# Patient Record
Sex: MALE | Race: WHITE | NOT HISPANIC OR LATINO | URBAN - METROPOLITAN AREA
[De-identification: names, ages, dates, MRNs, and addresses within clinical notes are randomized per-mention and may not be internally consistent; named-entity substitution may affect disease eponyms.]

---

## 2017-01-10 ENCOUNTER — EMERGENCY (EMERGENCY)
Facility: HOSPITAL | Age: 77
LOS: 0 days | Discharge: HOME | End: 2017-01-11
Admitting: EMERGENCY MEDICINE
Payer: MEDICARE

## 2017-01-10 DIAGNOSIS — R33.9 RETENTION OF URINE, UNSPECIFIED: ICD-10-CM

## 2017-01-10 DIAGNOSIS — R10.2 PELVIC AND PERINEAL PAIN: ICD-10-CM

## 2017-01-10 DIAGNOSIS — Z98.89 OTHER SPECIFIED POSTPROCEDURAL STATES: Chronic | ICD-10-CM

## 2017-01-10 DIAGNOSIS — Z90.49 ACQUIRED ABSENCE OF OTHER SPECIFIED PARTS OF DIGESTIVE TRACT: Chronic | ICD-10-CM

## 2017-01-10 DIAGNOSIS — Z46.6 ENCOUNTER FOR FITTING AND ADJUSTMENT OF URINARY DEVICE: ICD-10-CM

## 2017-01-10 PROCEDURE — 99284 EMERGENCY DEPT VISIT MOD MDM: CPT | Mod: 25

## 2017-01-10 PROCEDURE — 99053 MED SERV 10PM-8AM 24 HR FAC: CPT

## 2017-01-11 PROCEDURE — 93010 ELECTROCARDIOGRAM REPORT: CPT

## 2017-01-11 PROCEDURE — 71010: CPT | Mod: 26

## 2017-01-11 PROCEDURE — 74176 CT ABD & PELVIS W/O CONTRAST: CPT | Mod: 26

## 2017-02-19 ENCOUNTER — INPATIENT (INPATIENT)
Facility: HOSPITAL | Age: 77
LOS: 2 days | Discharge: HOME | End: 2017-02-22
Attending: INTERNAL MEDICINE | Admitting: INTERNAL MEDICINE
Payer: MEDICARE

## 2017-02-19 VITALS
OXYGEN SATURATION: 97 % | RESPIRATION RATE: 26 BRPM | SYSTOLIC BLOOD PRESSURE: 115 MMHG | DIASTOLIC BLOOD PRESSURE: 70 MMHG | HEART RATE: 96 BPM

## 2017-02-19 DIAGNOSIS — Z98.89 OTHER SPECIFIED POSTPROCEDURAL STATES: Chronic | ICD-10-CM

## 2017-02-19 DIAGNOSIS — Z90.49 ACQUIRED ABSENCE OF OTHER SPECIFIED PARTS OF DIGESTIVE TRACT: Chronic | ICD-10-CM

## 2017-02-19 LAB
ALBUMIN SERPL ELPH-MCNC: 3.3 G/DL — SIGNIFICANT CHANGE UP (ref 3.3–5)
ALP SERPL-CCNC: 86 U/L — SIGNIFICANT CHANGE UP (ref 40–120)
ALT FLD-CCNC: 10 U/L — LOW (ref 12–78)
ANION GAP SERPL CALC-SCNC: 11 MMOL/L — SIGNIFICANT CHANGE UP (ref 5–17)
APPEARANCE UR: (no result)
AST SERPL-CCNC: 23 U/L — SIGNIFICANT CHANGE UP (ref 15–37)
BACTERIA # UR AUTO: (no result)
BILIRUB SERPL-MCNC: 0.4 MG/DL — SIGNIFICANT CHANGE UP (ref 0.2–1.2)
BILIRUB UR-MCNC: NEGATIVE — SIGNIFICANT CHANGE UP
BUN SERPL-MCNC: 13 MG/DL — SIGNIFICANT CHANGE UP (ref 7–23)
CALCIUM SERPL-MCNC: 8.7 MG/DL — SIGNIFICANT CHANGE UP (ref 8.5–10.1)
CHLORIDE SERPL-SCNC: 104 MMOL/L — SIGNIFICANT CHANGE UP (ref 96–108)
CK SERPL-CCNC: 72 U/L — SIGNIFICANT CHANGE UP (ref 26–308)
CO2 SERPL-SCNC: 27 MMOL/L — SIGNIFICANT CHANGE UP (ref 22–31)
COLOR SPEC: YELLOW — SIGNIFICANT CHANGE UP
CREAT SERPL-MCNC: 1.1 MG/DL — SIGNIFICANT CHANGE UP (ref 0.5–1.3)
DIFF PNL FLD: (no result)
EPI CELLS # UR: SIGNIFICANT CHANGE UP
GLUCOSE SERPL-MCNC: 115 MG/DL — HIGH (ref 70–99)
GLUCOSE UR QL: NEGATIVE MG/DL — SIGNIFICANT CHANGE UP
KETONES UR-MCNC: NEGATIVE — SIGNIFICANT CHANGE UP
LEUKOCYTE ESTERASE UR-ACNC: (no result)
MAGNESIUM SERPL-MCNC: 1.9 MG/DL — SIGNIFICANT CHANGE UP (ref 1.8–2.4)
NITRITE UR-MCNC: POSITIVE
NT-PROBNP SERPL-SCNC: 181 PG/ML — SIGNIFICANT CHANGE UP (ref 0–450)
PH UR: 6.5 — SIGNIFICANT CHANGE UP (ref 4.8–8)
POTASSIUM SERPL-MCNC: 3.7 MMOL/L — SIGNIFICANT CHANGE UP (ref 3.5–5.3)
POTASSIUM SERPL-SCNC: 3.7 MMOL/L — SIGNIFICANT CHANGE UP (ref 3.5–5.3)
PROT SERPL-MCNC: 7.2 GM/DL — SIGNIFICANT CHANGE UP (ref 6–8.3)
PROT UR-MCNC: 100 MG/DL
RAPID RVP RESULT: SIGNIFICANT CHANGE UP
RBC CASTS # UR COMP ASSIST: (no result) /HPF (ref 0–4)
SODIUM SERPL-SCNC: 142 MMOL/L — SIGNIFICANT CHANGE UP (ref 135–145)
SP GR SPEC: 1.01 — SIGNIFICANT CHANGE UP (ref 1.01–1.02)
TROPONIN I SERPL-MCNC: <0.015 NG/ML — SIGNIFICANT CHANGE UP (ref 0.01–0.04)
TROPONIN I SERPL-MCNC: <0.015 NG/ML — SIGNIFICANT CHANGE UP (ref 0.01–0.04)
UROBILINOGEN FLD QL: NEGATIVE MG/DL — SIGNIFICANT CHANGE UP
WBC UR QL: >50

## 2017-02-19 PROCEDURE — 93010 ELECTROCARDIOGRAM REPORT: CPT

## 2017-02-19 PROCEDURE — 71010: CPT | Mod: 26

## 2017-02-19 PROCEDURE — 71275 CT ANGIOGRAPHY CHEST: CPT | Mod: 26

## 2017-02-19 PROCEDURE — 99285 EMERGENCY DEPT VISIT HI MDM: CPT | Mod: 25

## 2017-02-19 RX ORDER — HEPARIN SODIUM 5000 [USP'U]/ML
5000 INJECTION INTRAVENOUS; SUBCUTANEOUS EVERY 12 HOURS
Qty: 0 | Refills: 0 | Status: DISCONTINUED | OUTPATIENT
Start: 2017-02-19 | End: 2017-02-22

## 2017-02-19 RX ORDER — SODIUM CHLORIDE 9 MG/ML
1000 INJECTION INTRAMUSCULAR; INTRAVENOUS; SUBCUTANEOUS ONCE
Qty: 0 | Refills: 0 | Status: DISCONTINUED | OUTPATIENT
Start: 2017-02-19 | End: 2017-02-20

## 2017-02-19 RX ORDER — ACETAMINOPHEN 500 MG
650 TABLET ORAL EVERY 6 HOURS
Qty: 0 | Refills: 0 | Status: DISCONTINUED | OUTPATIENT
Start: 2017-02-19 | End: 2017-02-22

## 2017-02-19 RX ORDER — OXYBUTYNIN CHLORIDE 5 MG
5 TABLET ORAL THREE TIMES A DAY
Qty: 0 | Refills: 0 | Status: DISCONTINUED | OUTPATIENT
Start: 2017-02-19 | End: 2017-02-20

## 2017-02-19 RX ORDER — CEFTRIAXONE 500 MG/1
1 INJECTION, POWDER, FOR SOLUTION INTRAMUSCULAR; INTRAVENOUS EVERY 24 HOURS
Qty: 0 | Refills: 0 | Status: DISCONTINUED | OUTPATIENT
Start: 2017-02-20 | End: 2017-02-21

## 2017-02-19 RX ORDER — KETOROLAC TROMETHAMINE 30 MG/ML
15 SYRINGE (ML) INJECTION ONCE
Qty: 0 | Refills: 0 | Status: DISCONTINUED | OUTPATIENT
Start: 2017-02-19 | End: 2017-02-19

## 2017-02-19 RX ORDER — CEFTRIAXONE 500 MG/1
1 INJECTION, POWDER, FOR SOLUTION INTRAMUSCULAR; INTRAVENOUS ONCE
Qty: 0 | Refills: 0 | Status: COMPLETED | OUTPATIENT
Start: 2017-02-19 | End: 2017-02-19

## 2017-02-19 RX ADMIN — CEFTRIAXONE 100 GRAM(S): 500 INJECTION, POWDER, FOR SOLUTION INTRAMUSCULAR; INTRAVENOUS at 13:14

## 2017-02-19 RX ADMIN — Medication 5 MILLIGRAM(S): at 14:43

## 2017-02-19 RX ADMIN — Medication 650 MILLIGRAM(S): at 16:05

## 2017-02-19 RX ADMIN — HEPARIN SODIUM 5000 UNIT(S): 5000 INJECTION INTRAVENOUS; SUBCUTANEOUS at 18:17

## 2017-02-19 RX ADMIN — Medication 15 MILLIGRAM(S): at 18:47

## 2017-02-19 RX ADMIN — Medication 15 MILLIGRAM(S): at 18:16

## 2017-02-19 NOTE — H&P ADULT - NSHPPHYSICALEXAM_GEN_ALL_CORE
Vital Signs Last 24 Hrs  T(C): 36.7, Max: 36.8 (02-19 @ 07:14)  T(F): 98, Max: 98.3 (02-19 @ 07:14)  HR: 87 (72 - 96)  BP: 132/80 (104/65 - 132/80)  BP(mean): --  RR: 18 (18 - 26)  SpO2: 100% (97% - 100%)

## 2017-02-19 NOTE — ED PROVIDER NOTE - DETAILS:
Rosa Diaz MD - The scribe's documentation has been prepared under my direction and personally reviewed by me in its entirety. I confirm that the note above accurately reflects all work, treatment, procedures, and medical decision making performed by me.

## 2017-02-19 NOTE — ED PROVIDER NOTE - CONSTITUTIONAL APPEARANCE HYGIENE, MLM
Thin, chronically ill appearing, no teeth. alert, oriented to person, place, time/situation. Appears fatigued.

## 2017-02-19 NOTE — ED ADULT NURSE REASSESSMENT NOTE - NS ED NURSE REASSESS COMMENT FT1
pt was laying in bed no complain  is a/o 3, pt was straight cath while draining the ruin out pt was c/o pain. urine was dark urine was send to the lab. pt is eating lunch. awaiting for disposition.

## 2017-02-19 NOTE — ED ADULT NURSE NOTE - OBJECTIVE STATEMENT
pt c/o shortness of breath with chills. pt denies any recent fevers or sick contacts.  pt states he has penis pain which is chronic since unknown urology procedure 5 months ago. patient placed in bed and on cardiac monitor. helped to direct patient to slow breathing down.

## 2017-02-19 NOTE — ED PROVIDER NOTE - OBJECTIVE STATEMENT
78 y/o male pmhx AAA presents to ED due to difficulty breathing. Pt c/o right sided chest pain for hours, cough denies fever. PMD Mejia

## 2017-02-19 NOTE — H&P ADULT - ASSESSMENT
IMP:    76 y/o male with bladder spasm and self bladder cath x 10 yrs presents with supra pubic pain and L sided CP found to have UTI and likely bladder spasm.  No evidence of ACS    Plan:    Admit to floor    UTI--cont with CTX--follow up UCx    Bladder spasm--cont with oxybutynin and self cath as needed    DVT prophy--sqhep    Regular diet

## 2017-02-19 NOTE — ED PROVIDER NOTE - CARE PLAN
Principal Discharge DX:	Chest pain, unspecified type  Secondary Diagnosis:	Urinary tract infection associated with catheterization of urinary tract, unspecified indwelling urinary catheter type, subsequent encounter

## 2017-02-19 NOTE — H&P ADULT - HISTORY OF PRESENT ILLNESS
76 y/o male with bladder spasm and self bladder cath x 10 yrs presents with supra pubic pain and L sided CP.  Trop negative x 2. EKG--NSR with RBBB/L axis and LAHB, chest CTA negative for PE/infiltates. + UA given CTX in ED. Normal WBC and no fever  On my exam, c/o supra pubic pain, mod distress from pain, stable vitals.

## 2017-02-19 NOTE — H&P ADULT - NSHPLABSRESULTS_GEN_ALL_CORE
11.0   8.1   )-----------( 300      ( 2017 09:30 )             32.8       CBC Full  -  ( 2017 09:30 )  WBC Count : 8.1 K/uL  Hemoglobin : 11.0 g/dL  Hematocrit : 32.8 %  Platelet Count - Automated : 300 K/uL  Mean Cell Volume : 87.8 fl  Mean Cell Hemoglobin : 29.6 pg  Mean Cell Hemoglobin Concentration : 33.7 gm/dL  Auto Neutrophil # : 5.5 K/uL  Auto Lymphocyte # : 1.5 K/uL  Auto Monocyte # : 0.7 K/uL  Auto Eosinophil # : 0.3 K/uL  Auto Basophil # : 0.1 K/uL  Auto Neutrophil % : 67.8 %  Auto Lymphocyte % : 18.9 %  Auto Monocyte % : 8.4 %  Auto Eosinophil % : 4.1 %  Auto Basophil % : 0.9 %      2017 04:16    142    |  104    |  13     ----------------------------<  115    3.7     |  27     |  1.10     Ca    8.7        2017 04:16  Mg     1.9       2017 04:16    TPro  7.2    /  Alb  3.3    /  TBili  0.4    /  DBili  x      /  AST  23     /  ALT  10     /  AlkPhos  86     2017 04:16          Urinalysis Basic - ( 2017 11:36 )    Color: Yellow / Appearance: Slightly Turbid / S.010 / pH: x  Gluc: x / Ketone: Negative  / Bili: Negative / Urobili: Negative mg/dL   Blood: x / Protein: 100 mg/dL / Nitrite: Positive   Leuk Esterase: Moderate / RBC: 11-25 /HPF / WBC >50   Sq Epi: x / Non Sq Epi: Occasional / Bacteria: Few        LIVER FUNCTIONS - ( 2017 04:16 )  Alb: 3.3 g/dL / Pro: 7.2 gm/dL / ALK PHOS: 86 U/L / ALT: 10 U/L / AST: 23 U/L / GGT: x                 CARDIAC MARKERS ( 2017 09:30 )  <0.015 ng/mL / x     / x     / x     / x      CARDIAC MARKERS ( 2017 04:16 )  <0.015 ng/mL / x     / 72 U/L / x     / x          EKG--see HPI

## 2017-02-20 DIAGNOSIS — E87.6 HYPOKALEMIA: ICD-10-CM

## 2017-02-20 DIAGNOSIS — D64.9 ANEMIA, UNSPECIFIED: ICD-10-CM

## 2017-02-20 DIAGNOSIS — N39.0 URINARY TRACT INFECTION, SITE NOT SPECIFIED: ICD-10-CM

## 2017-02-20 DIAGNOSIS — K76.89 OTHER SPECIFIED DISEASES OF LIVER: ICD-10-CM

## 2017-02-20 DIAGNOSIS — R07.9 CHEST PAIN, UNSPECIFIED: ICD-10-CM

## 2017-02-20 LAB
ADD ON TEST-SPECIMEN IN LAB: SIGNIFICANT CHANGE UP
ANION GAP SERPL CALC-SCNC: 4 MMOL/L — LOW (ref 5–17)
ANION GAP SERPL CALC-SCNC: 7 MMOL/L — SIGNIFICANT CHANGE UP (ref 5–17)
BASOPHILS # BLD AUTO: 0.1 K/UL — SIGNIFICANT CHANGE UP (ref 0–0.2)
BASOPHILS NFR BLD AUTO: 1 % — SIGNIFICANT CHANGE UP (ref 0–2)
BUN SERPL-MCNC: 19 MG/DL — SIGNIFICANT CHANGE UP (ref 7–23)
BUN SERPL-MCNC: 22 MG/DL — SIGNIFICANT CHANGE UP (ref 7–23)
CALCIUM SERPL-MCNC: 8 MG/DL — LOW (ref 8.5–10.1)
CALCIUM SERPL-MCNC: 8.3 MG/DL — LOW (ref 8.5–10.1)
CHLORIDE SERPL-SCNC: 106 MMOL/L — SIGNIFICANT CHANGE UP (ref 96–108)
CHLORIDE SERPL-SCNC: 107 MMOL/L — SIGNIFICANT CHANGE UP (ref 96–108)
CO2 SERPL-SCNC: 30 MMOL/L — SIGNIFICANT CHANGE UP (ref 22–31)
CO2 SERPL-SCNC: 34 MMOL/L — HIGH (ref 22–31)
CREAT SERPL-MCNC: 1.35 MG/DL — HIGH (ref 0.5–1.3)
CREAT SERPL-MCNC: 1.41 MG/DL — HIGH (ref 0.5–1.3)
EOSINOPHIL # BLD AUTO: 0.3 K/UL — SIGNIFICANT CHANGE UP (ref 0–0.5)
EOSINOPHIL NFR BLD AUTO: 4 % — SIGNIFICANT CHANGE UP (ref 0–6)
GLUCOSE SERPL-MCNC: 91 MG/DL — SIGNIFICANT CHANGE UP (ref 70–99)
GLUCOSE SERPL-MCNC: 92 MG/DL — SIGNIFICANT CHANGE UP (ref 70–99)
HCT VFR BLD CALC: 31.9 % — LOW (ref 39–50)
HCT VFR BLD CALC: 35.1 % — LOW (ref 39–50)
HGB BLD-MCNC: 10.7 G/DL — LOW (ref 13–17)
HGB BLD-MCNC: 11.6 G/DL — LOW (ref 13–17)
LYMPHOCYTES # BLD AUTO: 1.6 K/UL — SIGNIFICANT CHANGE UP (ref 1–3.3)
LYMPHOCYTES # BLD AUTO: 20.3 % — SIGNIFICANT CHANGE UP (ref 13–44)
MAGNESIUM SERPL-MCNC: 2 MG/DL — SIGNIFICANT CHANGE UP (ref 1.8–2.4)
MAGNESIUM SERPL-MCNC: 2 MG/DL — SIGNIFICANT CHANGE UP (ref 1.8–2.4)
MCHC RBC-ENTMCNC: 29.6 PG — SIGNIFICANT CHANGE UP (ref 27–34)
MCHC RBC-ENTMCNC: 30 PG — SIGNIFICANT CHANGE UP (ref 27–34)
MCHC RBC-ENTMCNC: 33.2 GM/DL — SIGNIFICANT CHANGE UP (ref 32–36)
MCHC RBC-ENTMCNC: 33.6 GM/DL — SIGNIFICANT CHANGE UP (ref 32–36)
MCV RBC AUTO: 89.2 FL — SIGNIFICANT CHANGE UP (ref 80–100)
MCV RBC AUTO: 89.4 FL — SIGNIFICANT CHANGE UP (ref 80–100)
MONOCYTES # BLD AUTO: 0.6 K/UL — SIGNIFICANT CHANGE UP (ref 0–0.9)
MONOCYTES NFR BLD AUTO: 8.4 % — SIGNIFICANT CHANGE UP (ref 2–14)
NEUTROPHILS # BLD AUTO: 5.1 K/UL — SIGNIFICANT CHANGE UP (ref 1.8–7.4)
NEUTROPHILS NFR BLD AUTO: 66.3 % — SIGNIFICANT CHANGE UP (ref 43–77)
PHOSPHATE SERPL-MCNC: 2.6 MG/DL — SIGNIFICANT CHANGE UP (ref 2.5–4.5)
PLATELET # BLD AUTO: 240 K/UL — SIGNIFICANT CHANGE UP (ref 150–400)
PLATELET # BLD AUTO: 280 K/UL — SIGNIFICANT CHANGE UP (ref 150–400)
POTASSIUM SERPL-MCNC: 3.2 MMOL/L — LOW (ref 3.5–5.3)
POTASSIUM SERPL-MCNC: 3.3 MMOL/L — LOW (ref 3.5–5.3)
POTASSIUM SERPL-SCNC: 3.2 MMOL/L — LOW (ref 3.5–5.3)
POTASSIUM SERPL-SCNC: 3.3 MMOL/L — LOW (ref 3.5–5.3)
RBC # BLD: 3.57 M/UL — LOW (ref 4.2–5.8)
RBC # BLD: 3.93 M/UL — LOW (ref 4.2–5.8)
RBC # FLD: 12.3 % — SIGNIFICANT CHANGE UP (ref 10.3–14.5)
RBC # FLD: 12.4 % — SIGNIFICANT CHANGE UP (ref 10.3–14.5)
SODIUM SERPL-SCNC: 144 MMOL/L — SIGNIFICANT CHANGE UP (ref 135–145)
SODIUM SERPL-SCNC: 144 MMOL/L — SIGNIFICANT CHANGE UP (ref 135–145)
WBC # BLD: 7.1 K/UL — SIGNIFICANT CHANGE UP (ref 3.8–10.5)
WBC # BLD: 7.7 K/UL — SIGNIFICANT CHANGE UP (ref 3.8–10.5)
WBC # FLD AUTO: 7.1 K/UL — SIGNIFICANT CHANGE UP (ref 3.8–10.5)
WBC # FLD AUTO: 7.7 K/UL — SIGNIFICANT CHANGE UP (ref 3.8–10.5)

## 2017-02-20 PROCEDURE — 76770 US EXAM ABDO BACK WALL COMP: CPT | Mod: 26

## 2017-02-20 RX ORDER — DEXTROSE MONOHYDRATE, SODIUM CHLORIDE, AND POTASSIUM CHLORIDE 50; .745; 4.5 G/1000ML; G/1000ML; G/1000ML
2000 INJECTION, SOLUTION INTRAVENOUS
Qty: 0 | Refills: 0 | Status: DISCONTINUED | OUTPATIENT
Start: 2017-02-20 | End: 2017-02-22

## 2017-02-20 RX ORDER — POTASSIUM CHLORIDE 20 MEQ
40 PACKET (EA) ORAL ONCE
Qty: 0 | Refills: 0 | Status: COMPLETED | OUTPATIENT
Start: 2017-02-20 | End: 2017-02-20

## 2017-02-20 RX ORDER — KETOROLAC TROMETHAMINE 30 MG/ML
15 SYRINGE (ML) INJECTION ONCE
Qty: 0 | Refills: 0 | Status: DISCONTINUED | OUTPATIENT
Start: 2017-02-20 | End: 2017-02-20

## 2017-02-20 RX ORDER — SODIUM CHLORIDE 9 MG/ML
2000 INJECTION INTRAMUSCULAR; INTRAVENOUS; SUBCUTANEOUS
Qty: 0 | Refills: 0 | Status: DISCONTINUED | OUTPATIENT
Start: 2017-02-20 | End: 2017-02-20

## 2017-02-20 RX ADMIN — CEFTRIAXONE 100 GRAM(S): 500 INJECTION, POWDER, FOR SOLUTION INTRAMUSCULAR; INTRAVENOUS at 12:10

## 2017-02-20 RX ADMIN — Medication 40 MILLIEQUIVALENT(S): at 14:27

## 2017-02-20 RX ADMIN — Medication 5 MILLIGRAM(S): at 04:29

## 2017-02-20 RX ADMIN — Medication 650 MILLIGRAM(S): at 10:10

## 2017-02-20 RX ADMIN — Medication 15 MILLIGRAM(S): at 04:33

## 2017-02-20 RX ADMIN — SODIUM CHLORIDE 75 MILLILITER(S): 9 INJECTION INTRAMUSCULAR; INTRAVENOUS; SUBCUTANEOUS at 15:09

## 2017-02-20 RX ADMIN — DEXTROSE MONOHYDRATE, SODIUM CHLORIDE, AND POTASSIUM CHLORIDE 75 MILLILITER(S): 50; .745; 4.5 INJECTION, SOLUTION INTRAVENOUS at 15:57

## 2017-02-20 RX ADMIN — HEPARIN SODIUM 5000 UNIT(S): 5000 INJECTION INTRAVENOUS; SUBCUTANEOUS at 06:15

## 2017-02-20 RX ADMIN — Medication 650 MILLIGRAM(S): at 09:09

## 2017-02-20 RX ADMIN — Medication 15 MILLIGRAM(S): at 04:40

## 2017-02-20 NOTE — PROGRESS NOTE ADULT - ASSESSMENT
78 y/o male with bladder spasm and self bladder cath x 10 yrs presents with supra pubic pain and L sided CP found to have UTI and likely bladder spasm.

## 2017-02-21 DIAGNOSIS — I21.4 NON-ST ELEVATION (NSTEMI) MYOCARDIAL INFARCTION: ICD-10-CM

## 2017-02-21 LAB
-  AMPICILLIN/SULBACTAM: SIGNIFICANT CHANGE UP
-  CEFAZOLIN: SIGNIFICANT CHANGE UP
-  CIPROFLOXACIN: SIGNIFICANT CHANGE UP
-  DAPTOMYCIN: SIGNIFICANT CHANGE UP
-  GENTAMICIN: SIGNIFICANT CHANGE UP
-  LEVOFLOXACIN: SIGNIFICANT CHANGE UP
-  LINEZOLID: SIGNIFICANT CHANGE UP
-  NITROFURANTOIN: SIGNIFICANT CHANGE UP
-  OXACILLIN: SIGNIFICANT CHANGE UP
-  RIFAMPIN: SIGNIFICANT CHANGE UP
-  TETRACYCLINE: SIGNIFICANT CHANGE UP
-  TRIMETHOPRIM/SULFAMETHOXAZOLE: SIGNIFICANT CHANGE UP
-  VANCOMYCIN: SIGNIFICANT CHANGE UP
ANION GAP SERPL CALC-SCNC: 8 MMOL/L — SIGNIFICANT CHANGE UP (ref 5–17)
BASOPHILS # BLD AUTO: 0 K/UL — SIGNIFICANT CHANGE UP (ref 0–0.2)
BASOPHILS NFR BLD AUTO: 0.6 % — SIGNIFICANT CHANGE UP (ref 0–2)
BUN SERPL-MCNC: 18 MG/DL — SIGNIFICANT CHANGE UP (ref 7–23)
CALCIUM SERPL-MCNC: 8.3 MG/DL — LOW (ref 8.5–10.1)
CHLORIDE SERPL-SCNC: 111 MMOL/L — HIGH (ref 96–108)
CK SERPL-CCNC: 26 U/L — SIGNIFICANT CHANGE UP (ref 26–308)
CO2 SERPL-SCNC: 27 MMOL/L — SIGNIFICANT CHANGE UP (ref 22–31)
CREAT SERPL-MCNC: 1.03 MG/DL — SIGNIFICANT CHANGE UP (ref 0.5–1.3)
CULTURE RESULTS: SIGNIFICANT CHANGE UP
EOSINOPHIL # BLD AUTO: 0.4 K/UL — SIGNIFICANT CHANGE UP (ref 0–0.5)
EOSINOPHIL NFR BLD AUTO: 5.2 % — SIGNIFICANT CHANGE UP (ref 0–6)
GLUCOSE SERPL-MCNC: 94 MG/DL — SIGNIFICANT CHANGE UP (ref 70–99)
HCT VFR BLD CALC: 33.9 % — LOW (ref 39–50)
HGB BLD-MCNC: 11.4 G/DL — LOW (ref 13–17)
LYMPHOCYTES # BLD AUTO: 1.3 K/UL — SIGNIFICANT CHANGE UP (ref 1–3.3)
LYMPHOCYTES # BLD AUTO: 16.2 % — SIGNIFICANT CHANGE UP (ref 13–44)
MAGNESIUM SERPL-MCNC: 1.9 MG/DL — SIGNIFICANT CHANGE UP (ref 1.8–2.4)
MCHC RBC-ENTMCNC: 29.8 PG — SIGNIFICANT CHANGE UP (ref 27–34)
MCHC RBC-ENTMCNC: 33.6 GM/DL — SIGNIFICANT CHANGE UP (ref 32–36)
MCV RBC AUTO: 88.7 FL — SIGNIFICANT CHANGE UP (ref 80–100)
METHOD TYPE: SIGNIFICANT CHANGE UP
MONOCYTES # BLD AUTO: 0.7 K/UL — SIGNIFICANT CHANGE UP (ref 0–0.9)
MONOCYTES NFR BLD AUTO: 8.7 % — SIGNIFICANT CHANGE UP (ref 2–14)
NEUTROPHILS # BLD AUTO: 5.4 K/UL — SIGNIFICANT CHANGE UP (ref 1.8–7.4)
NEUTROPHILS NFR BLD AUTO: 69.3 % — SIGNIFICANT CHANGE UP (ref 43–77)
ORGANISM # SPEC MICROSCOPIC CNT: SIGNIFICANT CHANGE UP
ORGANISM # SPEC MICROSCOPIC CNT: SIGNIFICANT CHANGE UP
PLATELET # BLD AUTO: 274 K/UL — SIGNIFICANT CHANGE UP (ref 150–400)
POTASSIUM SERPL-MCNC: 3.7 MMOL/L — SIGNIFICANT CHANGE UP (ref 3.5–5.3)
POTASSIUM SERPL-SCNC: 3.7 MMOL/L — SIGNIFICANT CHANGE UP (ref 3.5–5.3)
RBC # BLD: 3.82 M/UL — LOW (ref 4.2–5.8)
RBC # FLD: 12.7 % — SIGNIFICANT CHANGE UP (ref 10.3–14.5)
SODIUM SERPL-SCNC: 146 MMOL/L — HIGH (ref 135–145)
SPECIMEN SOURCE: SIGNIFICANT CHANGE UP
TROPONIN I SERPL-MCNC: <0.015 NG/ML — SIGNIFICANT CHANGE UP (ref 0.01–0.04)
WBC # BLD: 7.8 K/UL — SIGNIFICANT CHANGE UP (ref 3.8–10.5)
WBC # FLD AUTO: 7.8 K/UL — SIGNIFICANT CHANGE UP (ref 3.8–10.5)

## 2017-02-21 PROCEDURE — 93010 ELECTROCARDIOGRAM REPORT: CPT

## 2017-02-21 RX ORDER — OXYBUTYNIN CHLORIDE 5 MG
5 TABLET ORAL DAILY
Qty: 0 | Refills: 0 | Status: DISCONTINUED | OUTPATIENT
Start: 2017-02-21 | End: 2017-02-22

## 2017-02-21 RX ORDER — CLOPIDOGREL BISULFATE 75 MG/1
75 TABLET, FILM COATED ORAL DAILY
Qty: 0 | Refills: 0 | Status: DISCONTINUED | OUTPATIENT
Start: 2017-02-21 | End: 2017-02-22

## 2017-02-21 RX ORDER — ASPIRIN/CALCIUM CARB/MAGNESIUM 324 MG
81 TABLET ORAL DAILY
Qty: 0 | Refills: 0 | Status: DISCONTINUED | OUTPATIENT
Start: 2017-02-21 | End: 2017-02-22

## 2017-02-21 RX ORDER — HYDROMORPHONE HYDROCHLORIDE 2 MG/ML
1 INJECTION INTRAMUSCULAR; INTRAVENOUS; SUBCUTANEOUS ONCE
Qty: 0 | Refills: 0 | Status: DISCONTINUED | OUTPATIENT
Start: 2017-02-21 | End: 2017-02-21

## 2017-02-21 RX ORDER — METOPROLOL TARTRATE 50 MG
25 TABLET ORAL
Qty: 0 | Refills: 0 | Status: DISCONTINUED | OUTPATIENT
Start: 2017-02-21 | End: 2017-02-22

## 2017-02-21 RX ADMIN — HYDROMORPHONE HYDROCHLORIDE 1 MILLIGRAM(S): 2 INJECTION INTRAMUSCULAR; INTRAVENOUS; SUBCUTANEOUS at 10:04

## 2017-02-21 RX ADMIN — HYDROMORPHONE HYDROCHLORIDE 1 MILLIGRAM(S): 2 INJECTION INTRAMUSCULAR; INTRAVENOUS; SUBCUTANEOUS at 09:40

## 2017-02-21 RX ADMIN — Medication 81 MILLIGRAM(S): at 08:28

## 2017-02-21 RX ADMIN — Medication 100 MILLIGRAM(S): at 19:19

## 2017-02-21 RX ADMIN — Medication 25 MILLIGRAM(S): at 18:18

## 2017-02-21 RX ADMIN — Medication 5 MILLIGRAM(S): at 23:21

## 2017-02-21 RX ADMIN — CLOPIDOGREL BISULFATE 75 MILLIGRAM(S): 75 TABLET, FILM COATED ORAL at 12:14

## 2017-02-21 RX ADMIN — Medication 25 MILLIGRAM(S): at 08:28

## 2017-02-21 RX ADMIN — DEXTROSE MONOHYDRATE, SODIUM CHLORIDE, AND POTASSIUM CHLORIDE 75 MILLILITER(S): 50; .745; 4.5 INJECTION, SOLUTION INTRAVENOUS at 05:27

## 2017-02-21 NOTE — PROVIDER CONTACT NOTE (OTHER) - ACTION/TREATMENT ORDERED:
ASA 81mg administered at 0807, Nitro given 0808. VS @ 0813 113/70 HR 90 RR 32. Garcia insterted after bladder scan of 667. Patient reporting relief of bladder pain. Dilaudid administered. Pt. monitored

## 2017-02-21 NOTE — CONSULT NOTE ADULT - ASSESSMENT
1. CP- now resolved. Pt had trops x 3 previously in admission that were negative. Repeat HAYDEE is pending. EKG appears unchanged. For now, will cont asa, Bbl. Will add plavix with load. Will hold on [facility] as no objective evidence of MI and pt CP free.  Will check 2Decho to evaluate LV fxn and transfer to [facility].     2. HTN- cont Bbl and follow closely.     3. AAA- if symptoms recur, may need CTA to assess for aortic dissection as well. Cont BP control.     4. UTI/urosepsis- cont abx, cx pending. Mgmt as per primary team.     5. DVT proph.     6. 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 No symptoms of syncope/dizziness. Cont to monitor.

## 2017-02-21 NOTE — CONSULT NOTE ADULT - SUBJECTIVE AND OBJECTIVE BOX
Cardiology Consultation      HPI: 78 y/o male with bladder spasm and self bladder cath x 10 yrs presents with supra pubic pain and L sided CP.  Trop negative x 2. EKG--NSR with RBBB/L axis and LAHB, chest CTA negative for PE/infiltates. + UA given CTX in ED. Normal WBC and no fever.     - Called by Dr. Singer today as pt c/o sharp, central CP. Pt was given asa/Bbl/dilaudid/NTG. Pt now CP free. EKG appears unchanges. Labs pending. Pt denies pain radiation, SOB, diaphoresis. No previous h/o CAD.       PAST MEDICAL & SURGICAL HISTORY:  Carotid stenosis: right and left  AAA (abdominal aortic aneurysm):   History of intestinal surgery  History of cholecystectomy  THR (Total Hip Replacement)      Allergies  morphine (Vomiting)  sulfa drugs (Other)    Intolerances    SOCIAL HISTORY: Denies tobacco, etoh abuse or illicit drug use    FAMILY HISTORY: Noncontributory    MEDICATIONS  (STANDING):  heparin  Injectable 5000Unit(s) SubCutaneous every 12 hours  sodium chloride 0.9% with potassium chloride 20 mEq/L 2000milliLiter(s) IV Continuous <Continuous>  aspirin enteric coated 81milliGRAM(s) Oral daily  metoprolol 25milliGRAM(s) Oral two times a day  HYDROmorphone  Injectable 1milliGRAM(s) IV Push once  doxycycline hyclate Capsule 100milliGRAM(s) Oral every 12 hours    MEDICATIONS  (PRN):  acetaminophen   Tablet 650milliGRAM(s) Oral every 6 hours PRN For Temp greater than 38 C (100.4 F)  acetaminophen   Tablet. 650milliGRAM(s) Oral every 6 hours PRN Mild Pain (1 - 3)  oxybutynin 5milliGRAM(s) Oral daily PRN Bladder spasms      Vital Signs Last 24 Hrs  T(C): 36.7, Max: 36.8 ( @ 04:58)  T(F): 98.1, Max: 98.3 ( @ 04:58)  HR: 90 (66 - 90)  BP: 113/70 (91/52 - 134/80)  BP(mean): --  RR: 30 (16 - 36)  SpO2: 97% (94% - 100%)    REVIEW OF SYSTEMS:    CONSTITUTIONAL:  As per HPI.  HEENT:  Eyes:  No diplopia or blurred vision. ENT:  No earache, sore throat or runny nose.  CARDIOVASCULAR:  +CP  RESPIRATORY:  No cough, shortness of breath, PND or orthopnea.  GASTROINTESTINAL:  No nausea, vomiting or diarrhea.  GENITOURINARY:  No dysuria, frequency or urgency.  MUSCULOSKELETAL:  As per HPI.  SKIN:  No change in skin, hair or nails.  NEUROLOGIC:  No paresthesias, fasciculations, seizures or weakness.  PSYCHIATRIC:  No disorder of thought or mood.  ENDOCRINE:  No heat or cold intolerance, polyuria or polydipsia.  HEMATOLOGICAL:  No easy bruising or bleedings.     PHYSICAL EXAMINATION:    GENERAL APPEARANCE:  Pt. is not currently dyspneic, in no distress. Pt. is alert, oriented, and pleasant.  HEENT:  Pupils are normal and react normally. No icterus. Mucous membranes well colored.  NECK:  Supple. No lymphadenopathy. Jugular venous pressure not elevated. Carotids equal.   HEART:   The cardiac impulse has a normal quality. There are no murmurs, rubs or gallops noted  CHEST:  Chest is clear to auscultation. Normal respiratory effort.  ABDOMEN:  Soft and nontender.   EXTREMITIES:  There is no edema.   SKIN:  No rash or significant lesions are noted.    I&O's Summary    I & Os for current day (as of 2017 09:12)  =============================================  IN: 2212 ml / OUT: 1450 ml / NET: 762 ml      LABS:                        11.4   7.8   )-----------( 274      ( 2017 06:52 )             33.9     2017 06:52    146    |  111    |  18     ----------------------------<  94     3.7     |  27     |  1.03     Ca    8.3        2017 06:52  Phos  2.6       2017 08:11  Mg     1.9       2017 06:52      CARDIAC MARKERS ( 2017 08:31 )  <0.015 ng/mL / x     / 26 U/L / x     / x      CARDIAC MARKERS ( 2017 09:30 )  <0.015 ng/mL / x     / x     / x     / x          Urinalysis Basic - ( 2017 11:36 )    Color: Yellow / Appearance: Slightly Turbid / S.010 / pH: x  Gluc: x / Ketone: Negative  / Bili: Negative / Urobili: Negative mg/dL   Blood: x / Protein: 100 mg/dL / Nitrite: Positive   Leuk Esterase: Moderate / RBC: 11-25 /HPF / WBC >50   Sq Epi: x / Non Sq Epi: Occasional / Bacteria: Few      EKG: SR @ 63, RBBB, LAHB, no acute ST depression/elevations    TELEMETRY: Not on tele.    CARDIAC TESTS: Pending    RADIOLOGY & ADDITIONAL STUDIES: CTA- negative for PE    ASSESSMENT & PLAN:

## 2017-02-21 NOTE — PROVIDER CONTACT NOTE (OTHER) - SITUATION
Pt. c/o chest pain, SOB, bladder distended and palpable with small amount of blood on meatus and bed sheets.

## 2017-02-21 NOTE — PROGRESS NOTE ADULT - PROBLEM SELECTOR PLAN 1
- ASA, NTG, BB, transf to tele HAYDEE, pending cardio consult
IV ceftriaxone, follow up urine cx, renal sono to r/o obstruction and evaluate prostate, stop oxybutynin can worsen urinary obstruction

## 2017-02-21 NOTE — PROGRESS NOTE ADULT - ASSESSMENT
76 y/o male with bladder spasm and self bladder cath x 10 yrs admitted on 2/19/17  with supra pubic pain and L sided CP.  Trop negative x 2.   EKG--NSR with RBBB/L axis and LAHB, chest CTA negative for PE/infiltates. + UA given CTX in ED. This am RR team called d/t chest pain   ekg done revealed changes . Pt recommended to have a cardiac cath     s/p Cardiac cath   - non- obstructive disease   medical therapy   post procedure routine for radial band   reviewed with patient 76 y/o male with bladder spasm and self bladder cath x 10 yrs admitted on 2/19/17  with supra pubic pain and L sided CP.  Trop negative x 2.   EKG--NSR with RBBB/L axis and LAHB, chest CTA negative for PE/infiltates. + UA given CTX in ED. This am RR team called d/t chest pain   ekg done revealed changes . Pt recommended to have a cardiac cath     s/p Cardiac cath   Normal LV systolic function. Estimated LV ejection fraction is 60 %.   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   Non-Obstructive CAD.   Hemodynamic status is normal.   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     Recommendations     Manage with medical therapy.  - non- obstructive disease   medical therapy   post procedure routine for radial band   reviewed with patient

## 2017-02-22 ENCOUNTER — TRANSCRIPTION ENCOUNTER (OUTPATIENT)
Age: 77
End: 2017-02-22

## 2017-02-22 VITALS — HEART RATE: 64 BPM | DIASTOLIC BLOOD PRESSURE: 61 MMHG | SYSTOLIC BLOOD PRESSURE: 110 MMHG

## 2017-02-22 LAB
ANION GAP SERPL CALC-SCNC: 7 MMOL/L — SIGNIFICANT CHANGE UP (ref 5–17)
BASOPHILS # BLD AUTO: 0.1 K/UL — SIGNIFICANT CHANGE UP (ref 0–0.2)
BASOPHILS NFR BLD AUTO: 0.8 % — SIGNIFICANT CHANGE UP (ref 0–2)
BUN SERPL-MCNC: 14 MG/DL — SIGNIFICANT CHANGE UP (ref 7–23)
CALCIUM SERPL-MCNC: 7.9 MG/DL — LOW (ref 8.5–10.1)
CHLORIDE SERPL-SCNC: 110 MMOL/L — HIGH (ref 96–108)
CO2 SERPL-SCNC: 29 MMOL/L — SIGNIFICANT CHANGE UP (ref 22–31)
CREAT SERPL-MCNC: 1.03 MG/DL — SIGNIFICANT CHANGE UP (ref 0.5–1.3)
EOSINOPHIL # BLD AUTO: 0.3 K/UL — SIGNIFICANT CHANGE UP (ref 0–0.5)
EOSINOPHIL NFR BLD AUTO: 3.8 % — SIGNIFICANT CHANGE UP (ref 0–6)
GLUCOSE SERPL-MCNC: 100 MG/DL — HIGH (ref 70–99)
HCT VFR BLD CALC: 33.5 % — LOW (ref 39–50)
HGB BLD-MCNC: 10.8 G/DL — LOW (ref 13–17)
LYMPHOCYTES # BLD AUTO: 1.3 K/UL — SIGNIFICANT CHANGE UP (ref 1–3.3)
LYMPHOCYTES # BLD AUTO: 17.5 % — SIGNIFICANT CHANGE UP (ref 13–44)
MCHC RBC-ENTMCNC: 28.8 PG — SIGNIFICANT CHANGE UP (ref 27–34)
MCHC RBC-ENTMCNC: 32.2 GM/DL — SIGNIFICANT CHANGE UP (ref 32–36)
MCV RBC AUTO: 89.6 FL — SIGNIFICANT CHANGE UP (ref 80–100)
MONOCYTES # BLD AUTO: 0.5 K/UL — SIGNIFICANT CHANGE UP (ref 0–0.9)
MONOCYTES NFR BLD AUTO: 7.3 % — SIGNIFICANT CHANGE UP (ref 2–14)
NEUTROPHILS # BLD AUTO: 5.1 K/UL — SIGNIFICANT CHANGE UP (ref 1.8–7.4)
NEUTROPHILS NFR BLD AUTO: 70.6 % — SIGNIFICANT CHANGE UP (ref 43–77)
PLATELET # BLD AUTO: 278 K/UL — SIGNIFICANT CHANGE UP (ref 150–400)
POTASSIUM SERPL-MCNC: 3.6 MMOL/L — SIGNIFICANT CHANGE UP (ref 3.5–5.3)
POTASSIUM SERPL-SCNC: 3.6 MMOL/L — SIGNIFICANT CHANGE UP (ref 3.5–5.3)
RBC # BLD: 3.74 M/UL — LOW (ref 4.2–5.8)
RBC # FLD: 12.6 % — SIGNIFICANT CHANGE UP (ref 10.3–14.5)
SODIUM SERPL-SCNC: 146 MMOL/L — HIGH (ref 135–145)
WBC # BLD: 7.2 K/UL — SIGNIFICANT CHANGE UP (ref 3.8–10.5)
WBC # FLD AUTO: 7.2 K/UL — SIGNIFICANT CHANGE UP (ref 3.8–10.5)

## 2017-02-22 RX ORDER — OXYBUTYNIN CHLORIDE 5 MG
1 TABLET ORAL
Qty: 90 | Refills: 0 | OUTPATIENT
Start: 2017-02-22

## 2017-02-22 RX ORDER — OXYBUTYNIN CHLORIDE 5 MG
1 TABLET ORAL
Qty: 0 | Refills: 0 | COMMUNITY

## 2017-02-22 RX ADMIN — Medication 25 MILLIGRAM(S): at 06:21

## 2017-02-22 RX ADMIN — Medication 5 MILLIGRAM(S): at 03:52

## 2017-02-22 RX ADMIN — Medication 100 MILLIGRAM(S): at 06:21

## 2017-02-22 NOTE — PROGRESS NOTE ADULT - ASSESSMENT
1. CP- now resolved. Pt s/p LHC showing nonobstructive CAD. No PCI performed. No further CP. Will check 2Decho to evaluate LV fxn and transfer to tele. Can d/c plavix, cont medical mgmt.     2. HTN- cont Bbl and follow closely.     3. AAA- if symptoms recur, may need CTA to assess for aortic dissection as well. Cont BP control.     4. UTI/urosepsis- cont abx, cx pending. Mgmt as per primary team.     5. DVT proph.     6. 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 No symptoms of syncope/dizziness. Cont to monitor.     7. Outpt f/u with me upon D/C in 1 week.

## 2017-02-22 NOTE — DISCHARGE NOTE ADULT - CARE PLAN
Principal Discharge DX:	UTI (urinary tract infection)  Goal:	resolved; abx for 5 days  Instructions for follow-up, activity and diet:	f/up with FAm doc in 1 week and ask him to request copy of records; f/up with cardion also  Secondary Diagnosis:	Chest pain, unspecified type  Goal:	- cath negative; CTA aortic root as outpatient

## 2017-02-22 NOTE — PROGRESS NOTE ADULT - SUBJECTIVE AND OBJECTIVE BOX
Cardiology Consultation      HPI: 76 y/o male with bladder spasm and self bladder cath x 10 yrs presents with supra pubic pain and L sided CP.  Trop negative x 2. EKG--NSR with RBBB/L axis and LAHB, chest CTA negative for PE/infiltates. + UA given CTX in ED. Normal WBC and no fever.    2/21- Called by Dr. Singer today as pt c/o sharp, central CP. Pt was given asa/Bbl/dilaudid/NTG. Pt now CP free. EKG appears unchanges. Labs pending. Pt denies pain radiation, SOB, diaphoresis. No previous h/o CAD.     2/22- Pt now s/p LHC- nonobstructive CAD. No PCI performed. SR on tele. No CP/SOB.     PAST MEDICAL & SURGICAL HISTORY:  Carotid stenosis: right and left  AAA (abdominal aortic aneurysm): 2010  History of intestinal surgery  History of cholecystectomy  THR (Total Hip Replacement)    Allergies    morphine (Vomiting)  sulfa drugs (Other)    Intolerances    SOCIAL HISTORY: Denies tobacco, etoh abuse or illicit drug use    FAMILY HISTORY: Noncontributory    MEDICATIONS  (STANDING):  heparin  Injectable 5000Unit(s) SubCutaneous every 12 hours  sodium chloride 0.9% with potassium chloride 20 mEq/L 2000milliLiter(s) IV Continuous <Continuous>  aspirin enteric coated 81milliGRAM(s) Oral daily  metoprolol 25milliGRAM(s) Oral two times a day  doxycycline hyclate Capsule 100milliGRAM(s) Oral every 12 hours  clopidogrel Tablet 75milliGRAM(s) Oral daily    MEDICATIONS  (PRN):  acetaminophen   Tablet 650milliGRAM(s) Oral every 6 hours PRN For Temp greater than 38 C (100.4 F)  acetaminophen   Tablet. 650milliGRAM(s) Oral every 6 hours PRN Mild Pain (1 - 3)  oxybutynin 5milliGRAM(s) Oral daily PRN Bladder spasms      Vital Signs Last 24 Hrs  T(C): 36.9, Max: 36.9 (02-22 @ 08:01)  T(F): 98.5, Max: 98.5 (02-22 @ 08:01)  HR: 59 (55 - 74)  BP: 99/64 (91/54 - 126/81)  BP(mean): 73 (58 - 91)  RR: 17 (11 - 24)  SpO2: 98% (95% - 100%)    REVIEW OF SYSTEMS:    CONSTITUTIONAL:  As per HPI.  HEENT:  Eyes:  No diplopia or blurred vision. ENT:  No earache, sore throat or runny nose.  CARDIOVASCULAR:  No pressure, squeezing, strangling, tightness, heaviness or aching about the chest, neck, axilla or epigastrium.  RESPIRATORY:  No cough, shortness of breath, PND or orthopnea.  GASTROINTESTINAL:  No nausea, vomiting or diarrhea.  GENITOURINARY:  No dysuria, frequency or urgency.  MUSCULOSKELETAL:  As per HPI.  SKIN:  No change in skin, hair or nails.  NEUROLOGIC:  No paresthesias, fasciculations, seizures or weakness.  PSYCHIATRIC:  No disorder of thought or mood.  ENDOCRINE:  No heat or cold intolerance, polyuria or polydipsia.  HEMATOLOGICAL:  No easy bruising or bleedings.     PHYSICAL EXAMINATION:    GENERAL APPEARANCE:  Pt. is not currently dyspneic, in no distress. Pt. is alert, oriented, and pleasant.  HEENT:  Pupils are normal and react normally. No icterus. Mucous membranes well colored.  NECK:  Supple. No lymphadenopathy. Jugular venous pressure not elevated. Carotids equal.   HEART:   The cardiac impulse has a normal quality. There are no murmurs, rubs or gallops noted  CHEST:  Chest is clear to auscultation. Normal respiratory effort.  ABDOMEN:  Soft and nontender.   EXTREMITIES:  There is no edema.   SKIN:  No rash or significant lesions are noted.    I&O's Summary    I & Os for current day (as of 22 Feb 2017 08:27)  =============================================  IN: 2337 ml / OUT: 3500 ml / NET: -1163 ml      LABS:                        10.8   7.2   )-----------( 278      ( 22 Feb 2017 06:03 )             33.5     22 Feb 2017 06:03    146    |  110    |  14     ----------------------------<  100    3.6     |  29     |  1.03     Ca    7.9        22 Feb 2017 06:03  Mg     1.9       21 Feb 2017 06:52    CARDIAC MARKERS ( 21 Feb 2017 19:18 )  <0.015 ng/mL / x     / x     / x     / x      CARDIAC MARKERS ( 21 Feb 2017 12:18 )  <0.015 ng/mL / x     / x     / x     / x      CARDIAC MARKERS ( 21 Feb 2017 08:31 )  <0.015 ng/mL / x     / 26 U/L / x     / x        EKG: SR @ 63, RBBB, LAHB, no acute ST depression/elevations    TELEMETRY: Not on tele.    CARDIAC TESTS: 2Decho- Cardiology Consultation    ASSESSMENT:
RRT to 2SW for "chest pain".  On arrival, pt c/o L sided CP and supra pubic/abd pain--same CC.  pt unable to pin point exact area of pain.  stable vitals.  Dr lizarraga at bedside working pt up.  She will call if further assistance needed.  case and Rx plan discussed at bedside with dr lizarraga, 2SW staff and nursing leadership present.
Subjective:  RR called pt c/o sudden retrosternal CP, ASA and NTG given; persistent CP; transf to tele; cardio call back pending; has TWI v5-v6; old RBBB    HPI:  76 y/o male with bladder spasm and self bladder cath x 10 yrs admitted on 2/19/17  with supra pubic pain and L sided CP.  Trop negative x 2.   EKG--NSR with RBBB/L axis and LAHB, chest CTA negative for PE/infiltates. + UA given CTX in ED.       Vital Signs Last 24 Hrs  T(C): 36.7, Max: 36.8 (02-21 @ 04:58)  T(F): 98.1, Max: 98.3 (02-21 @ 04:58)  HR: 90 (66 - 90)  BP: 113/70 (91/52 - 134/80)  BP(mean): --  RR: 36 (16 - 36)  SpO2: 94% (94% - 100%)    LABS:                        11.6   7.1   )-----------( 280      ( 20 Feb 2017 12:42 )             35.1     20 Feb 2017 12:42    144    |  106    |  22     ----------------------------<  92     3.3     |  34     |  1.41     Ca    8.3        20 Feb 2017 12:42  Phos  2.6       20 Feb 2017 08:11  Mg     2.0       20 Feb 2017 12:42    TPro  7.2    /  Alb  3.3    /  TBili  0.4    /  DBili  x      /  AST  23     /  ALT  10     /  AlkPhos  86     19 Feb 2017 04:16            EKG:    TWI v5-v6    RECENT CULTURES:  Culture - Urine (02.19.17 @ 11:37)    Specimen Source: .Urine Catheterized    Culture Results:   >100,000 CFU/ml Staphylococcus aureus      RADIOLOGY & ADDITIONAL TESTS:     CT ANGIO CHEST PE PROTOCOL IC  02/19/2017      No evidence of pulmonary emboli        2.   Otherwise unremarkable CT scan of the chest.           PHYSICAL EXAM:  GENERAL: NAD  NERVOUS SYSTEM:  Alert & Oriented X3, non- focal exam,  Motor Strength 5/5 B/L upper and lower extremities; DTRs 2+ intact and symmetric  HEAD:  Atraumatic, Normocephalic  EYES: EOMI, PERRLA, conjunctiva and sclera clear  HEENT: Moist mucous membranes  NECK: Supple, No JVD  CHEST/LUNG: Clear to auscultation bilaterally; No rales, no rhonchi, no wheezing, or rubs  HEART: Regular rate and rhythm; No murmurs, rubs, or gallops  ABDOMEN: Soft, Nontender, Nondistended; Bowel sounds present  GENITOURINARY- Voiding, no suprapubic tenderness  EXTREMITIES:  2+ Peripheral Pulses, No clubbing, cyanosis, or edema  MUSCULOSKELETAL:- No muscle tenderness, Muscle tone normal, No joint tenderness, no Joint swelling, Joint range of motion-normal  SKIN-no rash, no lesion      MEDICATIONS  (STANDING):  heparin  Injectable 5000Unit(s) SubCutaneous every 12 hours  cefTRIAXone   IVPB 1Gram(s) IV Intermittent every 24 hours  sodium chloride 0.9%. 2000milliLiter(s) IV Continuous <Continuous>
VSS denies pain or sob   R radial Band removed good hemostasis   fingers warm and mobile   good cap refill
Subjective:  Patient is a 77y old  Male who presents with a chief complaint of Supra pubic pain     HPI:  76 y/o male with bladder spasm and self bladder cath x 10 yrs admitted on 17  with supra pubic pain and L sided CP.  Trop negative x 2.   EKG--NSR with RBBB/L axis and LAHB, chest CTA negative for PE/infiltates. + UA given CTX in ED. Normal WBC and no fever  On my exam, c/o supra pubic pain, mod distress from pain, stable vitals.      - suprapubic pain persist, afebrile, tolerates IV abx, noted worsening of renal function      Patient seen and examined at bedside,     Review of system- Rest of the review of system are normal except mentioned in HPI    OBJECTIVE:   Vital Signs Last 24 Hrs  T(C): 36.4, Max: 36.8 ( @ 17:59)  T(F): 97.6, Max: 98.2 ( @ 17:59)  HR: 80 (66 - 80)  BP: 91/52 (91/52 - 110/65)  BP(mean): --  RR: 17 (17 - 18)  SpO2: 98% (97% - 98%)  LABS:                        11.6   7.1   )-----------( 280      ( 2017 12:42 )             35.1     2017 12:42    144    |  106    |  22     ----------------------------<  92     3.3     |  34     |  1.41     Ca    8.3        2017 12:42  Phos  2.6       2017 08:11  Mg     2.0       2017 12:42    TPro  7.2    /  Alb  3.3    /  TBili  0.4    /  DBili  x      /  AST  23     /  ALT  10     /  AlkPhos  86     2017 04:16      CARDIAC MARKERS ( 2017 09:30 )  <0.015 ng/mL / x     / x     / x     / x      CARDIAC MARKERS ( 2017 04:16 )  <0.015 ng/mL / x     / 72 U/L / x     / x          Urinalysis Basic - ( 2017 11:36 )    Color: Yellow / Appearance: Slightly Turbid / S.010 / pH: x  Gluc: x / Ketone: Negative  / Bili: Negative / Urobili: Negative mg/dL   Blood: x / Protein: 100 mg/dL / Nitrite: Positive   Leuk Esterase: Moderate / RBC: 11-25 /HPF / WBC >50   Sq Epi: x / Non Sq Epi: Occasional / Bacteria: Few        EKG:    Ventricular Rate 83 BPM    Atrial Rate 83 BPM    P-R Interval 148 ms    QRS Duration 136 ms    Q-T Interval 438 ms    QTC Calculation(Bezet) 514 ms    P Axis 67 degrees    R Axis -54 degrees    T Axis 48 degrees    Diagnosis Line Normal sinus rhythm  Possible Left atrial enlargement  Right bundle branch block  Left anterior fascicular block  *** Bifascicular block ***  Abnormal ECG    Confirmed by SHONDA GUILLORY MD ([id number]) on 2017 9:21:13 AM    RECENT CULTURES:  Culture - Urine (17 @ 11:37)    Specimen Source: .Urine Catheterized    Culture Results:   >100,000 CFU/ml Staphylococcus aureus      RADIOLOGY & ADDITIONAL TESTS:     CT ANGIO CHEST PE PROTOCOL IC  2017      No evidence of pulmonary emboli        2.   Otherwise unremarkable CT scan of the chest.           PHYSICAL EXAM:  GENERAL: NAD  NERVOUS SYSTEM:  Alert & Oriented X3, non- focal exam,  Motor Strength 5/5 B/L upper and lower extremities; DTRs 2+ intact and symmetric  HEAD:  Atraumatic, Normocephalic  EYES: EOMI, PERRLA, conjunctiva and sclera clear  HEENT: Moist mucous membranes  NECK: Supple, No JVD  CHEST/LUNG: Clear to auscultation bilaterally; No rales, no rhonchi, no wheezing, or rubs  HEART: Regular rate and rhythm; No murmurs, rubs, or gallops  ABDOMEN: Soft, Nontender, Nondistended; Bowel sounds present  GENITOURINARY- Voiding, no suprapubic tenderness  EXTREMITIES:  2+ Peripheral Pulses, No clubbing, cyanosis, or edema  MUSCULOSKELETAL:- No muscle tenderness, Muscle tone normal, No joint tenderness, no Joint swelling, Joint range of motion-normal  SKIN-no rash, no lesion      MEDICATIONS  (STANDING):  heparin  Injectable 5000Unit(s) SubCutaneous every 12 hours  cefTRIAXone   IVPB 1Gram(s) IV Intermittent every 24 hours  sodium chloride 0.9%. 2000milliLiter(s) IV Continuous <Continuous>

## 2017-02-22 NOTE — DISCHARGE NOTE ADULT - MEDICATION SUMMARY - MEDICATIONS TO TAKE
I will START or STAY ON the medications listed below when I get home from the hospital:    doxycycline monohydrate 100 mg oral capsule  -- 1 cap(s) by mouth every 12 hours  -- Indication: For UTI (urinary tract infection)    oxybutynin 5 mg oral tablet  -- 1 tab(s) by mouth 3 times a day, As Needed  -- Indication: For bladder

## 2017-02-22 NOTE — DISCHARGE NOTE ADULT - PATIENT PORTAL LINK FT
“You can access the [facility] Patient Portal, offered by [facility], by registering with the following website: [email or web address]”

## 2017-02-22 NOTE — DISCHARGE NOTE ADULT - CARE PROVIDER_API CALL
Franck Girard (DO), Cardiology; Internal Medicine  [address] [place] [address]  Phone: [phone]  Fax: [phone]

## 2017-02-22 NOTE — DISCHARGE NOTE ADULT - PLAN OF CARE
- cath negative; CTA aortic root as outpatient resolved; abx for 5 days f/up with [facility] doc in 1 week and ask him to request copy of records; f/up with cardion also

## 2017-02-22 NOTE — DISCHARGE NOTE ADULT - HOSPITAL COURSE
76 y/o male with bladder spasm and self bladder cath x 10 yrs admitted on 2/19/17  with supra pubic pain and L sided CP; was rx for UTI and developed CP yesterday; cath was neg; feels good this am. Wants to go home.    GENERAL: NAD  NERVOUS SYSTEM:  Alert & Oriented X3, non- focal exam,  Motor Strength 5/5 B/L upper and lower extremities; DTRs 2+ intact and symmetric  HEAD:  Atraumatic, Normocephalic  EYES: EOMI, PERRLA, conjunctiva and sclera clear  HEENT: Moist mucous membranes  NECK: Supple, No JVD  CHEST/LUNG: Clear to auscultation bilaterally; No rales, no rhonchi, no wheezing, or rubs  HEART: Regular rate and rhythm; No murmurs, rubs, or gallops  ABDOMEN: Soft, Nontender, Nondistended; Bowel sounds present  GENITOURINARY- Voiding, no suprapubic tenderness  EXTREMITIES:  2+ Peripheral Pulses, No clubbing, cyanosis, or edema  MUSCULOSKELETAL:- No muscle tenderness, Muscle tone normal, No joint tenderness, no Joint swelling, Joint range of motion-normal  SKIN-no rash, no lesion    Vital Signs Last 24 Hrs  T(C): 36.9, Max: 36.9 (02-22 @ 08:01)  T(F): 98.5, Max: 98.5 (02-22 @ 08:01)  HR: 65 (55 - 74)  BP: 107/68 (91/54 - 126/81)  BP(mean): 78 (58 - 91)  RR: 20 (11 - 24)  SpO2: 97% (95% - 100%)

## 2017-02-27 DIAGNOSIS — Z96.649 PRESENCE OF UNSPECIFIED ARTIFICIAL HIP JOINT: ICD-10-CM

## 2017-02-27 DIAGNOSIS — N32.89 OTHER SPECIFIED DISORDERS OF BLADDER: ICD-10-CM

## 2017-02-27 DIAGNOSIS — Z28.21 IMMUNIZATION NOT CARRIED OUT BECAUSE OF PATIENT REFUSAL: ICD-10-CM

## 2017-02-27 DIAGNOSIS — N17.9 ACUTE KIDNEY FAILURE, UNSPECIFIED: ICD-10-CM

## 2017-02-27 DIAGNOSIS — T83.518A INFECTION AND INFLAMMATORY REACTION DUE TO OTHER URINARY CATHETER, INITIAL ENCOUNTER: ICD-10-CM

## 2017-02-27 DIAGNOSIS — I10 ESSENTIAL (PRIMARY) HYPERTENSION: ICD-10-CM

## 2017-02-27 DIAGNOSIS — N39.0 URINARY TRACT INFECTION, SITE NOT SPECIFIED: ICD-10-CM

## 2017-02-27 DIAGNOSIS — E87.6 HYPOKALEMIA: ICD-10-CM

## 2017-02-27 DIAGNOSIS — Z88.2 ALLERGY STATUS TO SULFONAMIDES: ICD-10-CM

## 2017-02-27 DIAGNOSIS — K76.89 OTHER SPECIFIED DISEASES OF LIVER: ICD-10-CM

## 2017-02-27 DIAGNOSIS — D64.9 ANEMIA, UNSPECIFIED: ICD-10-CM

## 2017-02-27 DIAGNOSIS — Z98.890 OTHER SPECIFIED POSTPROCEDURAL STATES: ICD-10-CM

## 2017-02-27 DIAGNOSIS — Z88.5 ALLERGY STATUS TO NARCOTIC AGENT: ICD-10-CM

## 2017-02-27 DIAGNOSIS — I71.4 ABDOMINAL AORTIC ANEURYSM, WITHOUT RUPTURE: ICD-10-CM

## 2017-02-27 DIAGNOSIS — I25.119 ATHEROSCLEROTIC HEART DISEASE OF NATIVE CORONARY ARTERY WITH UNSPECIFIED ANGINA PECTORIS: ICD-10-CM

## 2017-06-06 NOTE — ASU PATIENT PROFILE, ADULT - PSH
H/O hemicolectomy    History of cholecystectomy    History of intestinal surgery    Left rib fracture    S/P cataract surgery, right    THR (Total Hip Replacement)

## 2017-06-06 NOTE — ASU PATIENT PROFILE, ADULT - PMH
AAA (abdominal aortic aneurysm)  2010  Carotid stenosis  right and left  Colon obstruction    Gallstones    Neurogenic bladder    Prostate CA

## 2017-06-06 NOTE — ASU PATIENT PROFILE, ADULT - VISION (WITH CORRECTIVE LENSES IF THE PATIENT USUALLY WEARS THEM):
Normal vision: sees adequately in most situations; can see medication labels, newsprint/left eye blurry

## 2017-06-07 ENCOUNTER — TRANSCRIPTION ENCOUNTER (OUTPATIENT)
Age: 77
End: 2017-06-07

## 2017-06-07 ENCOUNTER — OUTPATIENT (OUTPATIENT)
Dept: OUTPATIENT SERVICES | Facility: HOSPITAL | Age: 77
LOS: 1 days | End: 2017-06-07
Payer: MEDICARE

## 2017-06-07 VITALS
RESPIRATION RATE: 17 BRPM | HEIGHT: 68 IN | WEIGHT: 148.59 LBS | OXYGEN SATURATION: 98 % | SYSTOLIC BLOOD PRESSURE: 106 MMHG | HEART RATE: 58 BPM | TEMPERATURE: 98 F | DIASTOLIC BLOOD PRESSURE: 73 MMHG

## 2017-06-07 VITALS
DIASTOLIC BLOOD PRESSURE: 76 MMHG | SYSTOLIC BLOOD PRESSURE: 115 MMHG | RESPIRATION RATE: 19 BRPM | HEART RATE: 67 BPM | OXYGEN SATURATION: 100 %

## 2017-06-07 DIAGNOSIS — Z90.49 ACQUIRED ABSENCE OF OTHER SPECIFIED PARTS OF DIGESTIVE TRACT: Chronic | ICD-10-CM

## 2017-06-07 DIAGNOSIS — Z98.89 OTHER SPECIFIED POSTPROCEDURAL STATES: Chronic | ICD-10-CM

## 2017-06-07 DIAGNOSIS — Z98.41 CATARACT EXTRACTION STATUS, RIGHT EYE: Chronic | ICD-10-CM

## 2017-06-07 DIAGNOSIS — H25.10 AGE-RELATED NUCLEAR CATARACT, UNSPECIFIED EYE: ICD-10-CM

## 2017-06-07 DIAGNOSIS — Z98.890 OTHER SPECIFIED POSTPROCEDURAL STATES: Chronic | ICD-10-CM

## 2017-06-07 DIAGNOSIS — S22.32XA FRACTURE OF ONE RIB, LEFT SIDE, INITIAL ENCOUNTER FOR CLOSED FRACTURE: Chronic | ICD-10-CM

## 2017-06-07 PROCEDURE — C1780: CPT

## 2017-06-07 PROCEDURE — 66984 XCAPSL CTRC RMVL W/O ECP: CPT | Mod: LT

## 2017-06-07 NOTE — ASU DISCHARGE PLAN (ADULT/PEDIATRIC). - PT EDUC
other (specify)/Intraocular lens implant(IOL), Eye shield with instructions , sunglasses and eye kit given to patient./Implant card (specify)

## 2018-02-20 ENCOUNTER — APPOINTMENT (OUTPATIENT)
Dept: OTOLARYNGOLOGY | Facility: CLINIC | Age: 78
End: 2018-02-20

## 2018-06-16 ENCOUNTER — INPATIENT (INPATIENT)
Facility: HOSPITAL | Age: 78
LOS: 5 days | Discharge: LEFT BEFORE DISCHARGE | End: 2018-06-22
Attending: INTERNAL MEDICINE | Admitting: INTERNAL MEDICINE
Payer: MEDICARE

## 2018-06-16 ENCOUNTER — TRANSCRIPTION ENCOUNTER (OUTPATIENT)
Age: 78
End: 2018-06-16

## 2018-06-16 VITALS
WEIGHT: 147.93 LBS | TEMPERATURE: 98 F | RESPIRATION RATE: 18 BRPM | DIASTOLIC BLOOD PRESSURE: 69 MMHG | OXYGEN SATURATION: 95 % | HEIGHT: 71 IN | SYSTOLIC BLOOD PRESSURE: 98 MMHG | HEART RATE: 107 BPM

## 2018-06-16 DIAGNOSIS — Z98.41 CATARACT EXTRACTION STATUS, RIGHT EYE: Chronic | ICD-10-CM

## 2018-06-16 DIAGNOSIS — Z98.89 OTHER SPECIFIED POSTPROCEDURAL STATES: Chronic | ICD-10-CM

## 2018-06-16 DIAGNOSIS — S22.32XA FRACTURE OF ONE RIB, LEFT SIDE, INITIAL ENCOUNTER FOR CLOSED FRACTURE: Chronic | ICD-10-CM

## 2018-06-16 DIAGNOSIS — Z90.49 ACQUIRED ABSENCE OF OTHER SPECIFIED PARTS OF DIGESTIVE TRACT: Chronic | ICD-10-CM

## 2018-06-16 DIAGNOSIS — Z98.890 OTHER SPECIFIED POSTPROCEDURAL STATES: Chronic | ICD-10-CM

## 2018-06-16 LAB
ALBUMIN SERPL ELPH-MCNC: 3.2 G/DL — LOW (ref 3.3–5)
ALLERGY+IMMUNOLOGY DIAG STUDY NOTE: SIGNIFICANT CHANGE UP
ALP SERPL-CCNC: 116 U/L — SIGNIFICANT CHANGE UP (ref 40–120)
ALT FLD-CCNC: 15 U/L — SIGNIFICANT CHANGE UP (ref 12–78)
ANION GAP SERPL CALC-SCNC: 8 MMOL/L — SIGNIFICANT CHANGE UP (ref 5–17)
APPEARANCE UR: ABNORMAL
APTT BLD: 33.2 SEC — SIGNIFICANT CHANGE UP (ref 27.5–37.4)
AST SERPL-CCNC: 17 U/L — SIGNIFICANT CHANGE UP (ref 15–37)
BACTERIA # UR AUTO: ABNORMAL
BASOPHILS # BLD AUTO: 0.03 K/UL — SIGNIFICANT CHANGE UP (ref 0–0.2)
BASOPHILS NFR BLD AUTO: 0.2 % — SIGNIFICANT CHANGE UP (ref 0–2)
BILIRUB SERPL-MCNC: 0.9 MG/DL — SIGNIFICANT CHANGE UP (ref 0.2–1.2)
BILIRUB UR-MCNC: NEGATIVE — SIGNIFICANT CHANGE UP
BUN SERPL-MCNC: 27 MG/DL — HIGH (ref 7–23)
CALCIUM SERPL-MCNC: 8.5 MG/DL — SIGNIFICANT CHANGE UP (ref 8.5–10.1)
CHLORIDE SERPL-SCNC: 103 MMOL/L — SIGNIFICANT CHANGE UP (ref 96–108)
CO2 SERPL-SCNC: 26 MMOL/L — SIGNIFICANT CHANGE UP (ref 22–31)
COLOR SPEC: YELLOW — SIGNIFICANT CHANGE UP
CREAT SERPL-MCNC: 1.66 MG/DL — HIGH (ref 0.5–1.3)
DIFF PNL FLD: ABNORMAL
EOSINOPHIL # BLD AUTO: 0.01 K/UL — SIGNIFICANT CHANGE UP (ref 0–0.5)
EOSINOPHIL NFR BLD AUTO: 0.1 % — SIGNIFICANT CHANGE UP (ref 0–6)
EPI CELLS # UR: ABNORMAL
GLUCOSE SERPL-MCNC: 141 MG/DL — HIGH (ref 70–99)
GLUCOSE UR QL: NEGATIVE MG/DL — SIGNIFICANT CHANGE UP
HCT VFR BLD CALC: 37.5 % — LOW (ref 39–50)
HGB BLD-MCNC: 12.3 G/DL — LOW (ref 13–17)
IMM GRANULOCYTES NFR BLD AUTO: 1.1 % — SIGNIFICANT CHANGE UP (ref 0–1.5)
INR BLD: 1.67 RATIO — HIGH (ref 0.88–1.16)
KETONES UR-MCNC: NEGATIVE — SIGNIFICANT CHANGE UP
LACTATE SERPL-SCNC: 0.8 MMOL/L — SIGNIFICANT CHANGE UP (ref 0.7–2)
LACTATE SERPL-SCNC: 2.8 MMOL/L — HIGH (ref 0.7–2)
LACTATE SERPL-SCNC: 3 MMOL/L — HIGH (ref 0.7–2)
LEUKOCYTE ESTERASE UR-ACNC: ABNORMAL
LYMPHOCYTES # BLD AUTO: 0.64 K/UL — LOW (ref 1–3.3)
LYMPHOCYTES # BLD AUTO: 5 % — LOW (ref 13–44)
MCHC RBC-ENTMCNC: 27 PG — SIGNIFICANT CHANGE UP (ref 27–34)
MCHC RBC-ENTMCNC: 32.8 GM/DL — SIGNIFICANT CHANGE UP (ref 32–36)
MCV RBC AUTO: 82.4 FL — SIGNIFICANT CHANGE UP (ref 80–100)
MONOCYTES # BLD AUTO: 1.01 K/UL — HIGH (ref 0–0.9)
MONOCYTES NFR BLD AUTO: 7.9 % — SIGNIFICANT CHANGE UP (ref 2–14)
NEUTROPHILS # BLD AUTO: 11.01 K/UL — HIGH (ref 1.8–7.4)
NEUTROPHILS NFR BLD AUTO: 85.7 % — HIGH (ref 43–77)
NITRITE UR-MCNC: POSITIVE
NRBC # BLD: 0 /100 WBCS — SIGNIFICANT CHANGE UP (ref 0–0)
PH UR: 5 — SIGNIFICANT CHANGE UP (ref 5–8)
PLATELET # BLD AUTO: 203 K/UL — SIGNIFICANT CHANGE UP (ref 150–400)
POTASSIUM SERPL-MCNC: 3.8 MMOL/L — SIGNIFICANT CHANGE UP (ref 3.5–5.3)
POTASSIUM SERPL-SCNC: 3.8 MMOL/L — SIGNIFICANT CHANGE UP (ref 3.5–5.3)
PROT SERPL-MCNC: 7.8 GM/DL — SIGNIFICANT CHANGE UP (ref 6–8.3)
PROT UR-MCNC: 100 MG/DL
PROTHROM AB SERPL-ACNC: 18.2 SEC — HIGH (ref 9.8–12.7)
RBC # BLD: 4.55 M/UL — SIGNIFICANT CHANGE UP (ref 4.2–5.8)
RBC # FLD: 14.9 % — HIGH (ref 10.3–14.5)
RBC CASTS # UR COMP ASSIST: ABNORMAL /HPF (ref 0–4)
SODIUM SERPL-SCNC: 137 MMOL/L — SIGNIFICANT CHANGE UP (ref 135–145)
SP GR SPEC: 1.02 — SIGNIFICANT CHANGE UP (ref 1.01–1.02)
UROBILINOGEN FLD QL: 1 MG/DL
WBC # BLD: 12.84 K/UL — HIGH (ref 3.8–10.5)
WBC # FLD AUTO: 12.84 K/UL — HIGH (ref 3.8–10.5)
WBC UR QL: >50

## 2018-06-16 PROCEDURE — 93010 ELECTROCARDIOGRAM REPORT: CPT

## 2018-06-16 PROCEDURE — 74176 CT ABD & PELVIS W/O CONTRAST: CPT | Mod: 26

## 2018-06-16 PROCEDURE — 71045 X-RAY EXAM CHEST 1 VIEW: CPT | Mod: 26,77

## 2018-06-16 PROCEDURE — 71045 X-RAY EXAM CHEST 1 VIEW: CPT | Mod: 26

## 2018-06-16 PROCEDURE — 50432 PLMT NEPHROSTOMY CATHETER: CPT | Mod: RT

## 2018-06-16 PROCEDURE — 99291 CRITICAL CARE FIRST HOUR: CPT

## 2018-06-16 RX ORDER — FENTANYL CITRATE 50 UG/ML
50 INJECTION INTRAVENOUS
Qty: 0 | Refills: 0 | Status: DISCONTINUED | OUTPATIENT
Start: 2018-06-16 | End: 2018-06-16

## 2018-06-16 RX ORDER — ACETAMINOPHEN 500 MG
650 TABLET ORAL ONCE
Qty: 0 | Refills: 0 | Status: COMPLETED | OUTPATIENT
Start: 2018-06-16 | End: 2018-06-16

## 2018-06-16 RX ORDER — ACETAMINOPHEN 500 MG
1000 TABLET ORAL ONCE
Qty: 0 | Refills: 0 | Status: COMPLETED | OUTPATIENT
Start: 2018-06-16 | End: 2018-06-16

## 2018-06-16 RX ORDER — SODIUM CHLORIDE 9 MG/ML
500 INJECTION INTRAMUSCULAR; INTRAVENOUS; SUBCUTANEOUS
Qty: 0 | Refills: 0 | Status: COMPLETED | OUTPATIENT
Start: 2018-06-16 | End: 2018-06-16

## 2018-06-16 RX ORDER — MEPERIDINE HYDROCHLORIDE 50 MG/ML
12.5 INJECTION INTRAMUSCULAR; INTRAVENOUS; SUBCUTANEOUS
Qty: 0 | Refills: 0 | Status: DISCONTINUED | OUTPATIENT
Start: 2018-06-16 | End: 2018-06-16

## 2018-06-16 RX ORDER — CEFEPIME 1 G/1
1000 INJECTION, POWDER, FOR SOLUTION INTRAMUSCULAR; INTRAVENOUS ONCE
Qty: 0 | Refills: 0 | Status: COMPLETED | OUTPATIENT
Start: 2018-06-16 | End: 2018-06-16

## 2018-06-16 RX ORDER — IPRATROPIUM/ALBUTEROL SULFATE 18-103MCG
3 AEROSOL WITH ADAPTER (GRAM) INHALATION ONCE
Qty: 0 | Refills: 0 | Status: COMPLETED | OUTPATIENT
Start: 2018-06-16 | End: 2018-06-16

## 2018-06-16 RX ORDER — VANCOMYCIN HCL 1 G
1000 VIAL (EA) INTRAVENOUS ONCE
Qty: 0 | Refills: 0 | Status: COMPLETED | OUTPATIENT
Start: 2018-06-16 | End: 2018-06-16

## 2018-06-16 RX ORDER — SODIUM CHLORIDE 9 MG/ML
500 INJECTION INTRAMUSCULAR; INTRAVENOUS; SUBCUTANEOUS ONCE
Qty: 0 | Refills: 0 | Status: COMPLETED | OUTPATIENT
Start: 2018-06-16 | End: 2018-06-16

## 2018-06-16 RX ORDER — SODIUM CHLORIDE 9 MG/ML
1000 INJECTION INTRAMUSCULAR; INTRAVENOUS; SUBCUTANEOUS
Qty: 0 | Refills: 0 | Status: DISCONTINUED | OUTPATIENT
Start: 2018-06-16 | End: 2018-06-16

## 2018-06-16 RX ORDER — SODIUM CHLORIDE 9 MG/ML
2000 INJECTION INTRAMUSCULAR; INTRAVENOUS; SUBCUTANEOUS ONCE
Qty: 0 | Refills: 0 | Status: DISCONTINUED | OUTPATIENT
Start: 2018-06-16 | End: 2018-06-22

## 2018-06-16 RX ORDER — PHENYLEPHRINE HYDROCHLORIDE 10 MG/ML
0.5 INJECTION INTRAVENOUS
Qty: 40 | Refills: 0 | Status: DISCONTINUED | OUTPATIENT
Start: 2018-06-16 | End: 2018-06-21

## 2018-06-16 RX ORDER — OXYCODONE HYDROCHLORIDE 5 MG/1
5 TABLET ORAL EVERY 4 HOURS
Qty: 0 | Refills: 0 | Status: DISCONTINUED | OUTPATIENT
Start: 2018-06-16 | End: 2018-06-16

## 2018-06-16 RX ORDER — MEROPENEM 1 G/30ML
1000 INJECTION INTRAVENOUS EVERY 12 HOURS
Qty: 0 | Refills: 0 | Status: DISCONTINUED | OUTPATIENT
Start: 2018-06-16 | End: 2018-06-20

## 2018-06-16 RX ORDER — SODIUM CHLORIDE 9 MG/ML
3 INJECTION INTRAMUSCULAR; INTRAVENOUS; SUBCUTANEOUS ONCE
Qty: 0 | Refills: 0 | Status: COMPLETED | OUTPATIENT
Start: 2018-06-16 | End: 2018-06-16

## 2018-06-16 RX ORDER — ONDANSETRON 8 MG/1
4 TABLET, FILM COATED ORAL ONCE
Qty: 0 | Refills: 0 | Status: DISCONTINUED | OUTPATIENT
Start: 2018-06-16 | End: 2018-06-16

## 2018-06-16 RX ORDER — HEPARIN SODIUM 5000 [USP'U]/ML
5000 INJECTION INTRAVENOUS; SUBCUTANEOUS EVERY 12 HOURS
Qty: 0 | Refills: 0 | Status: DISCONTINUED | OUTPATIENT
Start: 2018-06-16 | End: 2018-06-22

## 2018-06-16 RX ORDER — ASPIRIN/CALCIUM CARB/MAGNESIUM 324 MG
81 TABLET ORAL DAILY
Qty: 0 | Refills: 0 | Status: DISCONTINUED | OUTPATIENT
Start: 2018-06-16 | End: 2018-06-22

## 2018-06-16 RX ORDER — SODIUM CHLORIDE 9 MG/ML
1000 INJECTION, SOLUTION INTRAVENOUS
Qty: 0 | Refills: 0 | Status: DISCONTINUED | OUTPATIENT
Start: 2018-06-16 | End: 2018-06-17

## 2018-06-16 RX ORDER — CEFEPIME 1 G/1
1000 INJECTION, POWDER, FOR SOLUTION INTRAMUSCULAR; INTRAVENOUS ONCE
Qty: 0 | Refills: 0 | Status: DISCONTINUED | OUTPATIENT
Start: 2018-06-16 | End: 2018-06-16

## 2018-06-16 RX ADMIN — Medication 650 MILLIGRAM(S): at 15:09

## 2018-06-16 RX ADMIN — SODIUM CHLORIDE 2000 MILLILITER(S): 9 INJECTION INTRAMUSCULAR; INTRAVENOUS; SUBCUTANEOUS at 16:33

## 2018-06-16 RX ADMIN — Medication 3 MILLILITER(S): at 18:47

## 2018-06-16 RX ADMIN — MEPERIDINE HYDROCHLORIDE 12.5 MILLIGRAM(S): 50 INJECTION INTRAMUSCULAR; INTRAVENOUS; SUBCUTANEOUS at 18:50

## 2018-06-16 RX ADMIN — SODIUM CHLORIDE 2000 MILLILITER(S): 9 INJECTION INTRAMUSCULAR; INTRAVENOUS; SUBCUTANEOUS at 16:34

## 2018-06-16 RX ADMIN — MEPERIDINE HYDROCHLORIDE 12.5 MILLIGRAM(S): 50 INJECTION INTRAMUSCULAR; INTRAVENOUS; SUBCUTANEOUS at 18:40

## 2018-06-16 RX ADMIN — Medication 400 MILLIGRAM(S): at 19:21

## 2018-06-16 RX ADMIN — Medication 81 MILLIGRAM(S): at 22:26

## 2018-06-16 RX ADMIN — PHENYLEPHRINE HYDROCHLORIDE 12.58 MICROGRAM(S)/KG/MIN: 10 INJECTION INTRAVENOUS at 20:55

## 2018-06-16 RX ADMIN — CEFEPIME 1000 MILLIGRAM(S): 1 INJECTION, POWDER, FOR SOLUTION INTRAMUSCULAR; INTRAVENOUS at 15:00

## 2018-06-16 RX ADMIN — SODIUM CHLORIDE 2000 MILLILITER(S): 9 INJECTION INTRAMUSCULAR; INTRAVENOUS; SUBCUTANEOUS at 17:14

## 2018-06-16 RX ADMIN — Medication 250 MILLIGRAM(S): at 15:00

## 2018-06-16 RX ADMIN — MEROPENEM 100 MILLIGRAM(S): 1 INJECTION INTRAVENOUS at 23:23

## 2018-06-16 RX ADMIN — MEPERIDINE HYDROCHLORIDE 12.5 MILLIGRAM(S): 50 INJECTION INTRAMUSCULAR; INTRAVENOUS; SUBCUTANEOUS at 19:00

## 2018-06-16 RX ADMIN — SODIUM CHLORIDE 2000 MILLILITER(S): 9 INJECTION INTRAMUSCULAR; INTRAVENOUS; SUBCUTANEOUS at 17:15

## 2018-06-16 RX ADMIN — SODIUM CHLORIDE 2000 MILLILITER(S): 9 INJECTION INTRAMUSCULAR; INTRAVENOUS; SUBCUTANEOUS at 19:02

## 2018-06-16 RX ADMIN — SODIUM CHLORIDE 3 MILLILITER(S): 9 INJECTION INTRAMUSCULAR; INTRAVENOUS; SUBCUTANEOUS at 15:00

## 2018-06-16 RX ADMIN — HEPARIN SODIUM 5000 UNIT(S): 5000 INJECTION INTRAVENOUS; SUBCUTANEOUS at 22:26

## 2018-06-16 NOTE — H&P ADULT - NSHPPHYSICALEXAM_GEN_ALL_CORE
General - pale  HEENT nc/at  neck supple no jvd  cv rrr  abdomen soft,    bilateral nephrostomy tubes draining clear urine, right nephrostomy site slightly bloody  ext warm

## 2018-06-16 NOTE — ED ADULT TRIAGE NOTE - CHIEF COMPLAINT QUOTE
Sent from urgent care c/o chest pain and fever- temp 102 at urgent care.  Code Sepsis. Sent from urgent care c/o chest pain and fever- temp 102 at urgent care. Nephrostomy tube dislodged last night.  Code Sepsis.

## 2018-06-16 NOTE — ED ADULT NURSE REASSESSMENT NOTE - NS ED NURSE REASSESS COMMENT FT1
Pt resting comfortably. SBP 80s/Dr Brandon aware. NS bolus infusing. Pt asymptomatic. Safety maintained/call bell within reach. Will continue to monitor.
Report given to LAVONNE GARDNER RN. NS bolus infusing. A+Ox3. Safety maintained. Transported to IR @ 17:13.

## 2018-06-16 NOTE — ED PROVIDER NOTE - PROGRESS NOTE DETAILS
Mihai Goncalves for attending Dr. Bryant: Spoke with Dr. Camilo Enriquez. Mihai Goncalves for attending Dr. Bryant: Dr. Enriquez is coming to see pt for right nephrostomy tube. AJM: pt received in signout. IR coming to place nephrostomy tube. pt at CT. ICU aware given severe sepsis. ICU requests 2 more L NS and repeat lactate as part of ICU eval. SBP in low 80s AJM: IR at bedside. Will take pt to IR suite for Nephrostomy tube placement. Pt still pending ICU eval. IVF running AJM: pt evaluated by ICU in PACu. will be admitted to step down under dr elizondo

## 2018-06-16 NOTE — ED ADULT NURSE NOTE - CHIEF COMPLAINT QUOTE
Sent from urgent care c/o chest pain and fever- temp 102 at urgent care. Nephrostomy tube dislodged last night.  Code Sepsis.

## 2018-06-16 NOTE — BRIEF OPERATIVE NOTE - OPERATION/FINDINGS
1) Severe R hydronephrosis on pre-US  2) Access R renal calyx under US  3) Cloudy urine drained  4) 8F nephrostomy in renal pelvis on final imaging

## 2018-06-16 NOTE — ED PROVIDER NOTE - CARE PLAN
Principal Discharge DX:	Nephrostomy tube displaced  Secondary Diagnosis:	Urinary tract infection without hematuria, site unspecified

## 2018-06-16 NOTE — BRIEF OPERATIVE NOTE - PROCEDURE
<<-----Click on this checkbox to enter Procedure Nephrostomy of right kidney with imaging guidance  06/16/2018    Active  RODGER

## 2018-06-16 NOTE — PATIENT PROFILE ADULT. - --DESCRIBE SURGICAL SITE
old right nephrostomy tube site adjacent to new right nephrostomy tube site placed on admission 6/16/18

## 2018-06-16 NOTE — H&P ADULT - ASSESSMENT
Patient with hx. prostate ca  with septic shock from right nephrostomy failing out  got dose vanco, cefepime, will  continue with meropenem given risk of resistant organisms  hydration  will need initiation of pressors  will admit to ICU  cultures drawm

## 2018-06-16 NOTE — ED PROVIDER NOTE - SKIN, MLM
Skin normal color for race, warm, dry and intact. No evidence of rash. Right flank with nephrostomy tube scar.

## 2018-06-16 NOTE — H&P ADULT - NSHPLABSRESULTS_GEN_ALL_CORE
CBC Full  -  ( 16 Jun 2018 14:16 )  WBC Count : 12.84 K/uL  Hemoglobin : 12.3 g/dL  Hematocrit : 37.5 %  Platelet Count - Automated : 203 K/uL  Mean Cell Volume : 82.4 fl  Mean Cell Hemoglobin : 27.0 pg  Mean Cell Hemoglobin Concentration : 32.8 gm/dL  Auto Neutrophil # : 11.01 K/uL  Auto Lymphocyte # : 0.64 K/uL  Auto Monocyte # : 1.01 K/uL  Auto Eosinophil # : 0.01 K/uL  Auto Basophil # : 0.03 K/uL  Auto Neutrophil % : 85.7 %  Auto Lymphocyte % : 5.0 %  Auto Monocyte % : 7.9 %  Auto Eosinophil % : 0.1 %  Auto Basophil % : 0.2 %      06-16    137  |  103  |  27<H>  ----------------------------<  141<H>  3.8   |  26  |  1.66<H>    Ca    8.5      16 Jun 2018 14:16    TPro  7.8  /  Alb  3.2<L>  /  TBili  0.9  /  DBili  x   /  AST  17  /  ALT  15  /  AlkPhos  116  06-16

## 2018-06-16 NOTE — H&P ADULT - HISTORY OF PRESENT ILLNESS
This patient is a 78 year old male with hx. prostate ca, s/p previous nephrostomy tubes, who had right nephrostomy fall out last night  He went to [facility] with fever to 102 and was sent to ED. In ed febrile, borderline bp, ct showed right hydronephrosis.  Patient went to IR  and had placement of Nephrostomy tube.  In pacu patient with rigors, with temp to 101.   Responded to code sepsis for bp in 70s . patinet lactate had corrected, inc after procedure to 3.0  patient comfortable , c/o pain around nephrostomy  tube.

## 2018-06-16 NOTE — ED PROVIDER NOTE - OBJECTIVE STATEMENT
79 y/o male with a PMHx of AAA, prostate CA, neurogenic bladder presents to the ED sent in by [facility]. As per EMS, pt was sent to ED because he did not look well at [facility]. Pt notes he has nephrostomy tube that has been dislodged since last night. +fever 79 y/o male with a PMHx of AAA, prostate CA, neurogenic bladder presents to the ED sent in by [facility]. As per EMS, pt was sent to ED because he did not look well at [facility]. Pt notes he has nephrostomy tube that has been dislodged since last night. +fever. Poor historian.

## 2018-06-17 LAB
ANION GAP SERPL CALC-SCNC: 9 MMOL/L — SIGNIFICANT CHANGE UP (ref 5–17)
BUN SERPL-MCNC: 19 MG/DL — SIGNIFICANT CHANGE UP (ref 7–23)
CALCIUM SERPL-MCNC: 7.6 MG/DL — LOW (ref 8.5–10.1)
CHLORIDE SERPL-SCNC: 116 MMOL/L — HIGH (ref 96–108)
CO2 SERPL-SCNC: 22 MMOL/L — SIGNIFICANT CHANGE UP (ref 22–31)
CREAT SERPL-MCNC: 1.39 MG/DL — HIGH (ref 0.5–1.3)
E COLI DNA BLD POS QL NAA+NON-PROBE: SIGNIFICANT CHANGE UP
GLUCOSE SERPL-MCNC: 106 MG/DL — HIGH (ref 70–99)
GRAM STN FLD: SIGNIFICANT CHANGE UP
HCT VFR BLD CALC: 33.3 % — LOW (ref 39–50)
HGB BLD-MCNC: 10.4 G/DL — LOW (ref 13–17)
MCHC RBC-ENTMCNC: 26.5 PG — LOW (ref 27–34)
MCHC RBC-ENTMCNC: 31.2 GM/DL — LOW (ref 32–36)
MCV RBC AUTO: 84.7 FL — SIGNIFICANT CHANGE UP (ref 80–100)
METHOD TYPE: SIGNIFICANT CHANGE UP
NRBC # BLD: 0 /100 WBCS — SIGNIFICANT CHANGE UP (ref 0–0)
PLATELET # BLD AUTO: 183 K/UL — SIGNIFICANT CHANGE UP (ref 150–400)
POTASSIUM SERPL-MCNC: 3.6 MMOL/L — SIGNIFICANT CHANGE UP (ref 3.5–5.3)
POTASSIUM SERPL-SCNC: 3.6 MMOL/L — SIGNIFICANT CHANGE UP (ref 3.5–5.3)
RBC # BLD: 3.93 M/UL — LOW (ref 4.2–5.8)
RBC # FLD: 15.4 % — HIGH (ref 10.3–14.5)
SODIUM SERPL-SCNC: 147 MMOL/L — HIGH (ref 135–145)
WBC # BLD: 17.51 K/UL — HIGH (ref 3.8–10.5)
WBC # FLD AUTO: 17.51 K/UL — HIGH (ref 3.8–10.5)

## 2018-06-17 RX ORDER — HYDROMORPHONE HYDROCHLORIDE 2 MG/ML
0.5 INJECTION INTRAMUSCULAR; INTRAVENOUS; SUBCUTANEOUS EVERY 4 HOURS
Qty: 0 | Refills: 0 | Status: DISCONTINUED | OUTPATIENT
Start: 2018-06-17 | End: 2018-06-17

## 2018-06-17 RX ORDER — ONDANSETRON 8 MG/1
4 TABLET, FILM COATED ORAL EVERY 6 HOURS
Qty: 0 | Refills: 0 | Status: DISCONTINUED | OUTPATIENT
Start: 2018-06-17 | End: 2018-06-22

## 2018-06-17 RX ORDER — OXYCODONE AND ACETAMINOPHEN 5; 325 MG/1; MG/1
1 TABLET ORAL EVERY 6 HOURS
Qty: 0 | Refills: 0 | Status: DISCONTINUED | OUTPATIENT
Start: 2018-06-17 | End: 2018-06-22

## 2018-06-17 RX ORDER — SODIUM CHLORIDE 9 MG/ML
1000 INJECTION, SOLUTION INTRAVENOUS
Qty: 0 | Refills: 0 | Status: DISCONTINUED | OUTPATIENT
Start: 2018-06-17 | End: 2018-06-19

## 2018-06-17 RX ORDER — MORPHINE SULFATE 50 MG/1
2 CAPSULE, EXTENDED RELEASE ORAL EVERY 4 HOURS
Qty: 0 | Refills: 0 | Status: DISCONTINUED | OUTPATIENT
Start: 2018-06-17 | End: 2018-06-17

## 2018-06-17 RX ADMIN — ONDANSETRON 4 MILLIGRAM(S): 8 TABLET, FILM COATED ORAL at 08:57

## 2018-06-17 RX ADMIN — PHENYLEPHRINE HYDROCHLORIDE 12.58 MICROGRAM(S)/KG/MIN: 10 INJECTION INTRAVENOUS at 06:31

## 2018-06-17 RX ADMIN — SODIUM CHLORIDE 75 MILLILITER(S): 9 INJECTION, SOLUTION INTRAVENOUS at 06:17

## 2018-06-17 RX ADMIN — MEROPENEM 100 MILLIGRAM(S): 1 INJECTION INTRAVENOUS at 06:17

## 2018-06-17 RX ADMIN — HEPARIN SODIUM 5000 UNIT(S): 5000 INJECTION INTRAVENOUS; SUBCUTANEOUS at 06:22

## 2018-06-17 RX ADMIN — Medication 81 MILLIGRAM(S): at 11:14

## 2018-06-17 RX ADMIN — MEROPENEM 100 MILLIGRAM(S): 1 INJECTION INTRAVENOUS at 17:23

## 2018-06-17 RX ADMIN — OXYCODONE AND ACETAMINOPHEN 1 TABLET(S): 5; 325 TABLET ORAL at 09:48

## 2018-06-17 RX ADMIN — OXYCODONE AND ACETAMINOPHEN 1 TABLET(S): 5; 325 TABLET ORAL at 08:57

## 2018-06-17 RX ADMIN — HEPARIN SODIUM 5000 UNIT(S): 5000 INJECTION INTRAVENOUS; SUBCUTANEOUS at 17:23

## 2018-06-17 RX ADMIN — SODIUM CHLORIDE 100 MILLILITER(S): 9 INJECTION, SOLUTION INTRAVENOUS at 11:14

## 2018-06-17 RX ADMIN — ONDANSETRON 4 MILLIGRAM(S): 8 TABLET, FILM COATED ORAL at 20:53

## 2018-06-17 RX ADMIN — PHENYLEPHRINE HYDROCHLORIDE 12.58 MICROGRAM(S)/KG/MIN: 10 INJECTION INTRAVENOUS at 17:46

## 2018-06-17 NOTE — CONSULT NOTE ADULT - ASSESSMENT
78 year old male with hx. prostate ca c/b obstructive uropathy, s/p previous bl nephrostomy tubes admitted 6/16 after right nephrostomy fell out night prior to admit  He went to  health with fever to 102 and was sent to ED. In ER Noted with temps to 101-102, hypotensive, elevated wbc ct 12-->17, positive UA, Ct abd/pelvis with T12 compression fx/enlarged prostate/thickened bladder UPJ obstruction and R hydro eval by IR and R nephrostomy inserted, was IV vanco/cefepime and on meropenem, blood cx growing Ecoli, is in CCU on pressor support.     1. septic shock w/ ecoli bacteremia/prostate ca w/ obstructive uropathy/VIJAY  - remains on pressor support, R PCN back in place both tubes draining  - on meropenem [id number] for gnr resistant coverage  - has ecoli growing in blood cx f/u urine cx and sensitivities  - continue with antibiotic coverage  - f/u cbc  - monitor temps  -tolerating abx well so far; no side effects noted  -reason for abx use and side effects reviewed with patient  - supportive care    2. other issues -care per medicine

## 2018-06-17 NOTE — CONSULT NOTE ADULT - SUBJECTIVE AND OBJECTIVE BOX
Patient is a 78y old  Male who presents with a chief complaint of right nephrostomy tube dislodged, fever, chest pain (2018 22:38)    HPI:  This patient is a 78 year old male with hx. prostate ca c/b obstructive uropathy, s/p previous bl nephrostomy tubes admitted  after right nephrostomy fell out night prior to admit  He went to [facility] with fever to 102 and was sent to ED. In ER Noted with temps to 101-102, hypotensive, elevated wbc ct 12-->17, positive UA, Ct abd/pelvis with T12 compression fx/enlarged prostate/thickened bladder UPJ obstruction and R hydro eval by IR and R nephrostomy inserted, was IV vanco/cefepime and on meropenem, blood cx growing Ecoli, is in CCU on pressor support.         PMH: as above  PSH: as above  Meds: per reconciliation sheet, noted below  MEDICATIONS  (STANDING):  aspirin  chewable 81 milliGRAM(s) Oral daily  heparin  Injectable 5000 Unit(s) SubCutaneous every 12 hours  meropenem  IVPB 1000 milliGRAM(s) IV Intermittent every 12 hours  phenylephrine    Infusion 0.5 MICROgram(s)/kG/Min (12.581 mL/Hr) IV Continuous <Continuous>  sodium chloride 0.45%. 1000 milliLiter(s) (100 mL/Hr) IV Continuous <Continuous>  sodium chloride 0.9% Bolus 2000 milliLiter(s) IV Bolus once  sodium chloride 0.9% Bolus 2000 milliLiter(s) IV Bolus once    Allergies    morphine (Vomiting)  sulfa drugs (Other)    Intolerances      Social: no smoking, no alcohol, no illegal drugs; no recent travel, no exposure to TB  FAMILY HISTORY:  No pertinent family history in first degree relatives     no history of premature cardiovascular disease in first degree relatives    ROS: no HA, no dizziness, no sore throat, no blurry vision, no CP, no palpitations, no SOB, no cough, no abdominal pain, no diarrhea,no leg pain, no claudication, no rash, no joint aches, no rectal pain or bleeding, no night sweats  All other systems reviewed and are negative    Vital Signs Last 24 Hrs  T(C): 36.8 (2018 08:07), Max: 38.9 (2018 14:43)  T(F): 98.2 (2018 08:07), Max: 102 (2018 14:43)  HR: 78 (2018 12:00) (67 - 121)  BP: 79/46 (2018 12:00) (73/43 - 162/100)  BP(mean): 53 (2018 12:00) (52 - 80)  RR: 21 (2018 12:00) (14 - 34)  SpO2: 98% (2018 12:00) (92% - 100%)  Daily Height in cm: 180.34 (2018 13:24)    Daily Weight in k (2018 04:32)    PE:    Constitutional: frail looking  HEENT: NC/AT, EOMI, PERRLA, conjunctivae clear; ears and nose atraumatic; pharynx benign  Neck: supple; thyroid not palpable  Back: no tenderness  Respiratory: respiratory effort normal; clear to auscultation  Cardiovascular: S1S2 regular, no murmurs  Abdomen: soft, not tender, not distended, positive BS; liver and spleen WNL  Genitourinary: no suprapubic tenderness b/l nephrostomies in place  Lymphatic: no LN palpable  Musculoskeletal: no muscle tenderness, no joint swelling or tenderness  Extremities: no pedal edema  Neurological/ Psychiatric: moving all extremities  Skin: no rashes; no palpable lesions    Labs: all available labs reviewed                        10.4   17.51 )-----------( 183      ( 2018 06:54 )             33.3     06-17    147<H>  |  116<H>  |  19  ----------------------------<  106<H>  3.6   |  22  |  1.39<H>    Ca    7.6<L>      2018 06:54    TPro  7.8  /  Alb  3.2<L>  /  TBili  0.9  /  DBili  x   /  AST  17  /  ALT  15  /  AlkPhos  116  06-16     LIVER FUNCTIONS - ( 2018 14:16 )  Alb: 3.2 g/dL / Pro: 7.8 gm/dL / ALK PHOS: 116 U/L / ALT: 15 U/L / AST: 17 U/L / GGT: x           Urinalysis Basic - ( 2018 14:45 )    Color: Yellow / Appearance: very cloudy / S.020 / pH: x  Gluc: x / Ketone: Negative  / Bili: Negative / Urobili: 1 mg/dL   Blood: x / Protein: 100 mg/dL / Nitrite: Positive   Leuk Esterase: Moderate / RBC: 25-50 /HPF / WBC >50   Sq Epi: x / Non Sq Epi: Many / Bacteria: Many        Culture Results:   Growth in anaerobic bottle: Gram Negative Rods  "Due to technical problems, Proteus sp. will Not be reported as part of  the BCID panel until further notice"  ***Blood Panel PCR results on this specimen are available  approximately 3 hours after the Gram stain result.***  Gram stain, PCR, and/or culture results may not always  correspond due to difference in methodologies.  ************************************************************  This PCR assay was performed using [employer].  The following targets are tested for: Enterococcus,  vancomycin resistant enterococci, Listeria monocytogenes,  coagulase negative staphylococci, S. aureus,  methicillin resistant S. aureus, Streptococcus agalactiae  (Group B), S. pneumoniae, S. pyogenes (Group A),  Acinetobacter baumannii, Enterobacter cloacae, E. coli,  Klebsiella oxytoca, K. pneumoniae, Proteus sp.,  Serratia marcescens, Haemophilus influenzae,  Neisseria meningitidis, Pseudomonas aeruginosa, Candida  albicans, C. glabrata, C krusei, C parapsilosis,  C. tropicalis and the KPC resistance gene. ( @ 14:16)    Radiology: all available radiological tests reviewed  < from: CT Abdomen and Pelvis No Cont (. @ 16:04) >    EXAM:  CT ABDOMEN AND PELVIS                            PROCEDURE DATE:  2018          INTERPRETATION:  CLINICAL STATEMENT: nephrostomy tube    TECHNIQUE: CT of the abdomen and pelvis was performed in the axial plane   utilizing thin slices without IV or oral contrast. Sagittal and coronal   reformatting was performed.    COMPARISON: 2017    FINDINGS:    The lower chest demonstrates no change in right middle lobe 4 mm nodule   seen since 2016. Lack of interval change suggests benign etiology.   The visualized descending aorta is again aneurysmal. There is coronary   artery calcification.    The evaluation of the abdominal viscera and the bowel is limited without   contrast.    The liver demonstrates a small cyst in the peripheral right lobe. There   is air in intrahepatic biliary tree. The gallbladder is not identified   and is likely surgically absent..    The spleen, pancreas and adrenal glands are grossly unremarkable.    A left    Breast tube is again noted without evidence of left hydronephrosis. There   is severe dilatation of the right renal pelvis and intrarenal collecting   system new from the prior study without evidence of ureteral dilatation.   There are multiple nonobstructive right renal calculi.. The bladder is   thick-walled which may be due to outlet obstruction. The prostate gland   is markedly enlarged. Bladder calculi are noted    There is no bowel obstruction.  There is no intraperitoneal free air.    There is no free fluid. There is a largeamount of stool in the colon.   Please correlate clinically for constipation    There is no abdominal or pelvic lymphadenopathy.  The pelvic structures   are grossly unremarkable. Surgical clips are again noted in the lower   abdomen.    The aorta isnot aneurysmal. There is pelvic lymphadenopathy in the left   common iliac and external iliac locations although evaluation is limited   without IV contrast. Enlarged left external iliac lymph node is seen on   axial image 105 although it appears smaller than on the previous   examination.    The bony structures demonstrate degenerative changes. There is a left   total hip replacement. There is levoscoliosis of the lumbar spine. There   is mild loss of height involving the T12 vertebral body which is a change   from the prior study.    IMPRESSION: Compression fracture of T12 is new from the previous study.  Redemonstration of aneurysmal dilatation of the ascending aorta.  Nonobstructive right renal calculi and bladder calculi  Enlarged prostate  Thick-walled bladder may be due to outlet obstruction  Severe dilatation of the right pelvis and renal calyces new from the   prior study may represent UPJ obstruction  Left pelvic adenopathy is seen although evaluation is limited by streak   artifact from the hip replacement and without IV contrast.    < end of copied text >    Advanced directives addressed: full resuscitation

## 2018-06-18 LAB
-  AMIKACIN: SIGNIFICANT CHANGE UP
-  AMIKACIN: SIGNIFICANT CHANGE UP
-  AMOXICILLIN/CLAVULANIC ACID: SIGNIFICANT CHANGE UP
-  AMOXICILLIN/CLAVULANIC ACID: SIGNIFICANT CHANGE UP
-  AMPICILLIN/SULBACTAM: SIGNIFICANT CHANGE UP
-  AMPICILLIN/SULBACTAM: SIGNIFICANT CHANGE UP
-  AMPICILLIN: SIGNIFICANT CHANGE UP
-  AZTREONAM: SIGNIFICANT CHANGE UP
-  AZTREONAM: SIGNIFICANT CHANGE UP
-  CEFAZOLIN: SIGNIFICANT CHANGE UP
-  CEFAZOLIN: SIGNIFICANT CHANGE UP
-  CEFEPIME: SIGNIFICANT CHANGE UP
-  CEFEPIME: SIGNIFICANT CHANGE UP
-  CEFOXITIN: SIGNIFICANT CHANGE UP
-  CEFOXITIN: SIGNIFICANT CHANGE UP
-  CEFTRIAXONE: SIGNIFICANT CHANGE UP
-  CEFTRIAXONE: SIGNIFICANT CHANGE UP
-  CIPROFLOXACIN: SIGNIFICANT CHANGE UP
-  ERTAPENEM: SIGNIFICANT CHANGE UP
-  ERTAPENEM: SIGNIFICANT CHANGE UP
-  GENTAMICIN: SIGNIFICANT CHANGE UP
-  GENTAMICIN: SIGNIFICANT CHANGE UP
-  IMIPENEM: SIGNIFICANT CHANGE UP
-  IMIPENEM: SIGNIFICANT CHANGE UP
-  LEVOFLOXACIN: SIGNIFICANT CHANGE UP
-  MEROPENEM: SIGNIFICANT CHANGE UP
-  MEROPENEM: SIGNIFICANT CHANGE UP
-  NITROFURANTOIN: SIGNIFICANT CHANGE UP
-  PIPERACILLIN/TAZOBACTAM: SIGNIFICANT CHANGE UP
-  PIPERACILLIN/TAZOBACTAM: SIGNIFICANT CHANGE UP
-  TETRACYCLINE: SIGNIFICANT CHANGE UP
-  TETRACYCLINE: SIGNIFICANT CHANGE UP
-  TIGECYCLINE: SIGNIFICANT CHANGE UP
-  TIGECYCLINE: SIGNIFICANT CHANGE UP
-  TOBRAMYCIN: SIGNIFICANT CHANGE UP
-  TOBRAMYCIN: SIGNIFICANT CHANGE UP
-  TRIMETHOPRIM/SULFAMETHOXAZOLE: SIGNIFICANT CHANGE UP
-  TRIMETHOPRIM/SULFAMETHOXAZOLE: SIGNIFICANT CHANGE UP
-  VANCOMYCIN: SIGNIFICANT CHANGE UP
-  VANCOMYCIN: SIGNIFICANT CHANGE UP
ANION GAP SERPL CALC-SCNC: 6 MMOL/L — SIGNIFICANT CHANGE UP (ref 5–17)
BUN SERPL-MCNC: 16 MG/DL — SIGNIFICANT CHANGE UP (ref 7–23)
CALCIUM SERPL-MCNC: 7.7 MG/DL — LOW (ref 8.5–10.1)
CHLORIDE SERPL-SCNC: 112 MMOL/L — HIGH (ref 96–108)
CO2 SERPL-SCNC: 23 MMOL/L — SIGNIFICANT CHANGE UP (ref 22–31)
CREAT SERPL-MCNC: 0.98 MG/DL — SIGNIFICANT CHANGE UP (ref 0.5–1.3)
CULTURE RESULTS: SIGNIFICANT CHANGE UP
CULTURE RESULTS: SIGNIFICANT CHANGE UP
GLUCOSE SERPL-MCNC: 93 MG/DL — SIGNIFICANT CHANGE UP (ref 70–99)
HCT VFR BLD CALC: 29.4 % — LOW (ref 39–50)
HGB BLD-MCNC: 9.5 G/DL — LOW (ref 13–17)
LACTATE SERPL-SCNC: 0.8 MMOL/L — SIGNIFICANT CHANGE UP (ref 0.7–2)
MAGNESIUM SERPL-MCNC: 1.6 MG/DL — SIGNIFICANT CHANGE UP (ref 1.6–2.6)
MCHC RBC-ENTMCNC: 27.1 PG — SIGNIFICANT CHANGE UP (ref 27–34)
MCHC RBC-ENTMCNC: 32.3 GM/DL — SIGNIFICANT CHANGE UP (ref 32–36)
MCV RBC AUTO: 84 FL — SIGNIFICANT CHANGE UP (ref 80–100)
METHOD TYPE: SIGNIFICANT CHANGE UP
NRBC # BLD: 0 /100 WBCS — SIGNIFICANT CHANGE UP (ref 0–0)
ORGANISM # SPEC MICROSCOPIC CNT: SIGNIFICANT CHANGE UP
PHOSPHATE SERPL-MCNC: 1.9 MG/DL — LOW (ref 2.5–4.5)
PLATELET # BLD AUTO: 150 K/UL — SIGNIFICANT CHANGE UP (ref 150–400)
POTASSIUM SERPL-MCNC: 3.2 MMOL/L — LOW (ref 3.5–5.3)
POTASSIUM SERPL-SCNC: 3.2 MMOL/L — LOW (ref 3.5–5.3)
RBC # BLD: 3.5 M/UL — LOW (ref 4.2–5.8)
RBC # FLD: 15.4 % — HIGH (ref 10.3–14.5)
SODIUM SERPL-SCNC: 141 MMOL/L — SIGNIFICANT CHANGE UP (ref 135–145)
SPECIMEN SOURCE: SIGNIFICANT CHANGE UP
SPECIMEN SOURCE: SIGNIFICANT CHANGE UP
WBC # BLD: 10.34 K/UL — SIGNIFICANT CHANGE UP (ref 3.8–10.5)
WBC # FLD AUTO: 10.34 K/UL — SIGNIFICANT CHANGE UP (ref 3.8–10.5)

## 2018-06-18 RX ORDER — POTASSIUM PHOSPHATE, MONOBASIC POTASSIUM PHOSPHATE, DIBASIC 236; 224 MG/ML; MG/ML
15 INJECTION, SOLUTION INTRAVENOUS ONCE
Qty: 0 | Refills: 0 | Status: COMPLETED | OUTPATIENT
Start: 2018-06-18 | End: 2018-06-18

## 2018-06-18 RX ADMIN — HEPARIN SODIUM 5000 UNIT(S): 5000 INJECTION INTRAVENOUS; SUBCUTANEOUS at 17:27

## 2018-06-18 RX ADMIN — POTASSIUM PHOSPHATE, MONOBASIC POTASSIUM PHOSPHATE, DIBASIC 62.5 MILLIMOLE(S): 236; 224 INJECTION, SOLUTION INTRAVENOUS at 13:21

## 2018-06-18 RX ADMIN — MEROPENEM 100 MILLIGRAM(S): 1 INJECTION INTRAVENOUS at 05:42

## 2018-06-18 RX ADMIN — HEPARIN SODIUM 5000 UNIT(S): 5000 INJECTION INTRAVENOUS; SUBCUTANEOUS at 05:42

## 2018-06-18 RX ADMIN — ONDANSETRON 4 MILLIGRAM(S): 8 TABLET, FILM COATED ORAL at 02:37

## 2018-06-18 RX ADMIN — MEROPENEM 100 MILLIGRAM(S): 1 INJECTION INTRAVENOUS at 17:24

## 2018-06-18 RX ADMIN — PHENYLEPHRINE HYDROCHLORIDE 12.58 MICROGRAM(S)/KG/MIN: 10 INJECTION INTRAVENOUS at 05:42

## 2018-06-18 RX ADMIN — PHENYLEPHRINE HYDROCHLORIDE 12.58 MICROGRAM(S)/KG/MIN: 10 INJECTION INTRAVENOUS at 17:24

## 2018-06-18 RX ADMIN — Medication 81 MILLIGRAM(S): at 11:07

## 2018-06-18 RX ADMIN — SODIUM CHLORIDE 100 MILLILITER(S): 9 INJECTION, SOLUTION INTRAVENOUS at 06:20

## 2018-06-19 LAB
-  AMIKACIN: SIGNIFICANT CHANGE UP
-  AMPICILLIN/SULBACTAM: SIGNIFICANT CHANGE UP
-  AMPICILLIN: SIGNIFICANT CHANGE UP
-  AZTREONAM: SIGNIFICANT CHANGE UP
-  CEFAZOLIN: SIGNIFICANT CHANGE UP
-  CEFEPIME: SIGNIFICANT CHANGE UP
-  CEFOXITIN: SIGNIFICANT CHANGE UP
-  CEFTRIAXONE: SIGNIFICANT CHANGE UP
-  CIPROFLOXACIN: SIGNIFICANT CHANGE UP
-  ERTAPENEM: SIGNIFICANT CHANGE UP
-  GENTAMICIN: SIGNIFICANT CHANGE UP
-  IMIPENEM: SIGNIFICANT CHANGE UP
-  LEVOFLOXACIN: SIGNIFICANT CHANGE UP
-  MEROPENEM: SIGNIFICANT CHANGE UP
-  PIPERACILLIN/TAZOBACTAM: SIGNIFICANT CHANGE UP
-  TOBRAMYCIN: SIGNIFICANT CHANGE UP
-  TRIMETHOPRIM/SULFAMETHOXAZOLE: SIGNIFICANT CHANGE UP
ANION GAP SERPL CALC-SCNC: 6 MMOL/L — SIGNIFICANT CHANGE UP (ref 5–17)
BUN SERPL-MCNC: 13 MG/DL — SIGNIFICANT CHANGE UP (ref 7–23)
CALCIUM SERPL-MCNC: 7.7 MG/DL — LOW (ref 8.5–10.1)
CHLORIDE SERPL-SCNC: 111 MMOL/L — HIGH (ref 96–108)
CO2 SERPL-SCNC: 27 MMOL/L — SIGNIFICANT CHANGE UP (ref 22–31)
CREAT SERPL-MCNC: 0.83 MG/DL — SIGNIFICANT CHANGE UP (ref 0.5–1.3)
CULTURE RESULTS: SIGNIFICANT CHANGE UP
GLUCOSE SERPL-MCNC: 120 MG/DL — HIGH (ref 70–99)
HCT VFR BLD CALC: 30.6 % — LOW (ref 39–50)
HGB BLD-MCNC: 9.9 G/DL — LOW (ref 13–17)
MAGNESIUM SERPL-MCNC: 1.6 MG/DL — SIGNIFICANT CHANGE UP (ref 1.6–2.6)
MCHC RBC-ENTMCNC: 27 PG — SIGNIFICANT CHANGE UP (ref 27–34)
MCHC RBC-ENTMCNC: 32.4 GM/DL — SIGNIFICANT CHANGE UP (ref 32–36)
MCV RBC AUTO: 83.4 FL — SIGNIFICANT CHANGE UP (ref 80–100)
METHOD TYPE: SIGNIFICANT CHANGE UP
NRBC # BLD: 0 /100 WBCS — SIGNIFICANT CHANGE UP (ref 0–0)
ORGANISM # SPEC MICROSCOPIC CNT: SIGNIFICANT CHANGE UP
PHOSPHATE SERPL-MCNC: 1.9 MG/DL — LOW (ref 2.5–4.5)
PLATELET # BLD AUTO: 171 K/UL — SIGNIFICANT CHANGE UP (ref 150–400)
POTASSIUM SERPL-MCNC: 3.6 MMOL/L — SIGNIFICANT CHANGE UP (ref 3.5–5.3)
POTASSIUM SERPL-SCNC: 3.6 MMOL/L — SIGNIFICANT CHANGE UP (ref 3.5–5.3)
RBC # BLD: 3.67 M/UL — LOW (ref 4.2–5.8)
RBC # FLD: 15.1 % — HIGH (ref 10.3–14.5)
SODIUM SERPL-SCNC: 144 MMOL/L — SIGNIFICANT CHANGE UP (ref 135–145)
SPECIMEN SOURCE: SIGNIFICANT CHANGE UP
WBC # BLD: 6.64 K/UL — SIGNIFICANT CHANGE UP (ref 3.8–10.5)
WBC # FLD AUTO: 6.64 K/UL — SIGNIFICANT CHANGE UP (ref 3.8–10.5)

## 2018-06-19 PROCEDURE — 50435 EXCHANGE NEPHROSTOMY CATH: CPT | Mod: LT

## 2018-06-19 RX ORDER — POTASSIUM PHOSPHATE, MONOBASIC POTASSIUM PHOSPHATE, DIBASIC 236; 224 MG/ML; MG/ML
15 INJECTION, SOLUTION INTRAVENOUS ONCE
Qty: 0 | Refills: 0 | Status: COMPLETED | OUTPATIENT
Start: 2018-06-19 | End: 2018-06-19

## 2018-06-19 RX ADMIN — MEROPENEM 100 MILLIGRAM(S): 1 INJECTION INTRAVENOUS at 17:09

## 2018-06-19 RX ADMIN — SODIUM CHLORIDE 100 MILLILITER(S): 9 INJECTION, SOLUTION INTRAVENOUS at 01:22

## 2018-06-19 RX ADMIN — HEPARIN SODIUM 5000 UNIT(S): 5000 INJECTION INTRAVENOUS; SUBCUTANEOUS at 17:09

## 2018-06-19 RX ADMIN — POTASSIUM PHOSPHATE, MONOBASIC POTASSIUM PHOSPHATE, DIBASIC 62.5 MILLIMOLE(S): 236; 224 INJECTION, SOLUTION INTRAVENOUS at 09:15

## 2018-06-19 RX ADMIN — HEPARIN SODIUM 5000 UNIT(S): 5000 INJECTION INTRAVENOUS; SUBCUTANEOUS at 05:37

## 2018-06-19 RX ADMIN — MEROPENEM 100 MILLIGRAM(S): 1 INJECTION INTRAVENOUS at 05:37

## 2018-06-19 RX ADMIN — Medication 81 MILLIGRAM(S): at 15:39

## 2018-06-19 NOTE — BRIEF OPERATIVE NOTE - POST-OP DX
Hydronephrosis, right  06/16/2018    Active  Camilo Enriquez  Nephrostomy complication  06/19/2018    Camilo Rodney

## 2018-06-19 NOTE — BRIEF OPERATIVE NOTE - PROCEDURE
<<-----Click on this checkbox to enter Procedure Nephrostomy of left kidney with imaging guidance  06/19/2018    Active  AARMETTA  Nephrostomy of right kidney with imaging guidance  06/19/2018    Active  AARMETTA

## 2018-06-19 NOTE — BRIEF OPERATIVE NOTE - OPERATION/FINDINGS
1) Pre-existing nephrostomy tube completely removed  2) L nephrostomy replaced via existing tract, 8F

## 2018-06-20 LAB
ANION GAP SERPL CALC-SCNC: 6 MMOL/L — SIGNIFICANT CHANGE UP (ref 5–17)
BUN SERPL-MCNC: 12 MG/DL — SIGNIFICANT CHANGE UP (ref 7–23)
CALCIUM SERPL-MCNC: 8.3 MG/DL — LOW (ref 8.5–10.1)
CHLORIDE SERPL-SCNC: 109 MMOL/L — HIGH (ref 96–108)
CO2 SERPL-SCNC: 28 MMOL/L — SIGNIFICANT CHANGE UP (ref 22–31)
CREAT SERPL-MCNC: 0.72 MG/DL — SIGNIFICANT CHANGE UP (ref 0.5–1.3)
GLUCOSE SERPL-MCNC: 96 MG/DL — SIGNIFICANT CHANGE UP (ref 70–99)
HCT VFR BLD CALC: 32.1 % — LOW (ref 39–50)
HGB BLD-MCNC: 10.3 G/DL — LOW (ref 13–17)
MAGNESIUM SERPL-MCNC: 1.6 MG/DL — SIGNIFICANT CHANGE UP (ref 1.6–2.6)
MCHC RBC-ENTMCNC: 26.5 PG — LOW (ref 27–34)
MCHC RBC-ENTMCNC: 32.1 GM/DL — SIGNIFICANT CHANGE UP (ref 32–36)
MCV RBC AUTO: 82.5 FL — SIGNIFICANT CHANGE UP (ref 80–100)
NRBC # BLD: 0 /100 WBCS — SIGNIFICANT CHANGE UP (ref 0–0)
PHOSPHATE SERPL-MCNC: 2.2 MG/DL — LOW (ref 2.5–4.5)
PLATELET # BLD AUTO: 169 K/UL — SIGNIFICANT CHANGE UP (ref 150–400)
POTASSIUM SERPL-MCNC: 3.5 MMOL/L — SIGNIFICANT CHANGE UP (ref 3.5–5.3)
POTASSIUM SERPL-SCNC: 3.5 MMOL/L — SIGNIFICANT CHANGE UP (ref 3.5–5.3)
RBC # BLD: 3.89 M/UL — LOW (ref 4.2–5.8)
RBC # FLD: 14.9 % — HIGH (ref 10.3–14.5)
SODIUM SERPL-SCNC: 143 MMOL/L — SIGNIFICANT CHANGE UP (ref 135–145)
WBC # BLD: 5.41 K/UL — SIGNIFICANT CHANGE UP (ref 3.8–10.5)
WBC # FLD AUTO: 5.41 K/UL — SIGNIFICANT CHANGE UP (ref 3.8–10.5)

## 2018-06-20 RX ORDER — MEROPENEM 1 G/30ML
1000 INJECTION INTRAVENOUS EVERY 8 HOURS
Qty: 0 | Refills: 0 | Status: DISCONTINUED | OUTPATIENT
Start: 2018-06-20 | End: 2018-06-22

## 2018-06-20 RX ORDER — OXYBUTYNIN CHLORIDE 5 MG
5 TABLET ORAL DAILY
Qty: 0 | Refills: 0 | Status: DISCONTINUED | OUTPATIENT
Start: 2018-06-20 | End: 2018-06-22

## 2018-06-20 RX ADMIN — MEROPENEM 100 MILLIGRAM(S): 1 INJECTION INTRAVENOUS at 06:02

## 2018-06-20 RX ADMIN — MEROPENEM 100 MILLIGRAM(S): 1 INJECTION INTRAVENOUS at 22:00

## 2018-06-20 RX ADMIN — Medication 81 MILLIGRAM(S): at 14:04

## 2018-06-20 RX ADMIN — Medication 5 MILLIGRAM(S): at 11:21

## 2018-06-20 RX ADMIN — HEPARIN SODIUM 5000 UNIT(S): 5000 INJECTION INTRAVENOUS; SUBCUTANEOUS at 06:02

## 2018-06-20 RX ADMIN — MEROPENEM 100 MILLIGRAM(S): 1 INJECTION INTRAVENOUS at 14:04

## 2018-06-20 RX ADMIN — HEPARIN SODIUM 5000 UNIT(S): 5000 INJECTION INTRAVENOUS; SUBCUTANEOUS at 17:41

## 2018-06-20 NOTE — PHYSICAL THERAPY INITIAL EVALUATION ADULT - PERTINENT HX OF CURRENT PROBLEM, REHAB EVAL
78yoM admitted due to R nephrostomy tube fell out causing R hydronephrosis and sepsis, R tube replaced 6/16/18, L tube fell out during hospital stay and replaced 6/19/18.

## 2018-06-20 NOTE — PHYSICAL THERAPY INITIAL EVALUATION ADULT - GENERAL OBSERVATIONS, REHAB EVAL
Pt received supine in bed, B nephrostomy tubes, tele, BP cuff. Pt AAOx4, reports 9/10 bladder pain/spasms. Eager to get out of bed.

## 2018-06-20 NOTE — PHYSICAL THERAPY INITIAL EVALUATION ADULT - ADL SKILLS, REHAB EVAL
needs device and assist/cane, aide for cooking/cleaning/driving. Pt otherwise (I) with dressing, toileting, bathing, eating.

## 2018-06-20 NOTE — PHYSICAL THERAPY INITIAL EVALUATION ADULT - CRITERIA FOR SKILLED THERAPEUTIC INTERVENTIONS
predicted duration of therapy intervention/therapy frequency/anticipated equipment needs at discharge/anticipated discharge recommendation/impairments found/functional limitations in following categories/risk reduction/prevention/rehab potential

## 2018-06-21 LAB
ANION GAP SERPL CALC-SCNC: 6 MMOL/L — SIGNIFICANT CHANGE UP (ref 5–17)
BUN SERPL-MCNC: 13 MG/DL — SIGNIFICANT CHANGE UP (ref 7–23)
CALCIUM SERPL-MCNC: 7.9 MG/DL — LOW (ref 8.5–10.1)
CHLORIDE SERPL-SCNC: 108 MMOL/L — SIGNIFICANT CHANGE UP (ref 96–108)
CO2 SERPL-SCNC: 29 MMOL/L — SIGNIFICANT CHANGE UP (ref 22–31)
CREAT SERPL-MCNC: 0.74 MG/DL — SIGNIFICANT CHANGE UP (ref 0.5–1.3)
CULTURE RESULTS: SIGNIFICANT CHANGE UP
GLUCOSE SERPL-MCNC: 106 MG/DL — HIGH (ref 70–99)
HCT VFR BLD CALC: 31.6 % — LOW (ref 39–50)
HGB BLD-MCNC: 10.3 G/DL — LOW (ref 13–17)
MCHC RBC-ENTMCNC: 26.9 PG — LOW (ref 27–34)
MCHC RBC-ENTMCNC: 32.6 GM/DL — SIGNIFICANT CHANGE UP (ref 32–36)
MCV RBC AUTO: 82.5 FL — SIGNIFICANT CHANGE UP (ref 80–100)
NRBC # BLD: 0 /100 WBCS — SIGNIFICANT CHANGE UP (ref 0–0)
PLATELET # BLD AUTO: 152 K/UL — SIGNIFICANT CHANGE UP (ref 150–400)
POTASSIUM SERPL-MCNC: 3.3 MMOL/L — LOW (ref 3.5–5.3)
POTASSIUM SERPL-SCNC: 3.3 MMOL/L — LOW (ref 3.5–5.3)
RBC # BLD: 3.83 M/UL — LOW (ref 4.2–5.8)
RBC # FLD: 14.8 % — HIGH (ref 10.3–14.5)
SODIUM SERPL-SCNC: 143 MMOL/L — SIGNIFICANT CHANGE UP (ref 135–145)
SPECIMEN SOURCE: SIGNIFICANT CHANGE UP
WBC # BLD: 4.6 K/UL — SIGNIFICANT CHANGE UP (ref 3.8–10.5)
WBC # FLD AUTO: 4.6 K/UL — SIGNIFICANT CHANGE UP (ref 3.8–10.5)

## 2018-06-21 RX ADMIN — HEPARIN SODIUM 5000 UNIT(S): 5000 INJECTION INTRAVENOUS; SUBCUTANEOUS at 05:31

## 2018-06-21 RX ADMIN — Medication 81 MILLIGRAM(S): at 12:25

## 2018-06-21 RX ADMIN — Medication 5 MILLIGRAM(S): at 12:25

## 2018-06-21 RX ADMIN — MEROPENEM 100 MILLIGRAM(S): 1 INJECTION INTRAVENOUS at 14:39

## 2018-06-21 RX ADMIN — MEROPENEM 100 MILLIGRAM(S): 1 INJECTION INTRAVENOUS at 21:09

## 2018-06-21 RX ADMIN — MEROPENEM 100 MILLIGRAM(S): 1 INJECTION INTRAVENOUS at 05:31

## 2018-06-21 RX ADMIN — HEPARIN SODIUM 5000 UNIT(S): 5000 INJECTION INTRAVENOUS; SUBCUTANEOUS at 18:16

## 2018-06-21 NOTE — PROVIDER CONTACT NOTE (OTHER) - SITUATION
Patient not compliant with his nephrostomy tubing, frequent education done with patient, needs frequent reinforcing. Also not compliant to call for RN getting in and out of bed. Safety maintained,
notified service; spoke to Chetna

## 2018-06-22 ENCOUNTER — TRANSCRIPTION ENCOUNTER (OUTPATIENT)
Age: 78
End: 2018-06-22

## 2018-06-22 VITALS — WEIGHT: 176.81 LBS

## 2018-06-22 LAB
ANION GAP SERPL CALC-SCNC: 8 MMOL/L — SIGNIFICANT CHANGE UP (ref 5–17)
BUN SERPL-MCNC: 11 MG/DL — SIGNIFICANT CHANGE UP (ref 7–23)
CALCIUM SERPL-MCNC: 8.1 MG/DL — LOW (ref 8.5–10.1)
CHLORIDE SERPL-SCNC: 107 MMOL/L — SIGNIFICANT CHANGE UP (ref 96–108)
CO2 SERPL-SCNC: 28 MMOL/L — SIGNIFICANT CHANGE UP (ref 22–31)
CREAT SERPL-MCNC: 0.75 MG/DL — SIGNIFICANT CHANGE UP (ref 0.5–1.3)
GLUCOSE SERPL-MCNC: 95 MG/DL — SIGNIFICANT CHANGE UP (ref 70–99)
HCT VFR BLD CALC: 32.7 % — LOW (ref 39–50)
HGB BLD-MCNC: 10.4 G/DL — LOW (ref 13–17)
MCHC RBC-ENTMCNC: 25.9 PG — LOW (ref 27–34)
MCHC RBC-ENTMCNC: 31.8 GM/DL — LOW (ref 32–36)
MCV RBC AUTO: 81.5 FL — SIGNIFICANT CHANGE UP (ref 80–100)
NRBC # BLD: 0 /100 WBCS — SIGNIFICANT CHANGE UP (ref 0–0)
PLATELET # BLD AUTO: 190 K/UL — SIGNIFICANT CHANGE UP (ref 150–400)
POTASSIUM SERPL-MCNC: 3.2 MMOL/L — LOW (ref 3.5–5.3)
POTASSIUM SERPL-SCNC: 3.2 MMOL/L — LOW (ref 3.5–5.3)
RBC # BLD: 4.01 M/UL — LOW (ref 4.2–5.8)
RBC # FLD: 14.7 % — HIGH (ref 10.3–14.5)
SODIUM SERPL-SCNC: 143 MMOL/L — SIGNIFICANT CHANGE UP (ref 135–145)
WBC # BLD: 4.96 K/UL — SIGNIFICANT CHANGE UP (ref 3.8–10.5)
WBC # FLD AUTO: 4.96 K/UL — SIGNIFICANT CHANGE UP (ref 3.8–10.5)

## 2018-06-22 RX ORDER — ERTAPENEM SODIUM 1 G/1
1000 INJECTION, POWDER, LYOPHILIZED, FOR SOLUTION INTRAMUSCULAR; INTRAVENOUS ONCE
Qty: 0 | Refills: 0 | Status: COMPLETED | OUTPATIENT
Start: 2018-06-22 | End: 2018-06-22

## 2018-06-22 RX ORDER — ERTAPENEM SODIUM 1 G/1
INJECTION, POWDER, LYOPHILIZED, FOR SOLUTION INTRAMUSCULAR; INTRAVENOUS
Qty: 0 | Refills: 0 | Status: DISCONTINUED | OUTPATIENT
Start: 2018-06-22 | End: 2018-06-22

## 2018-06-22 RX ORDER — ERTAPENEM SODIUM 1 G/1
1000 INJECTION, POWDER, LYOPHILIZED, FOR SOLUTION INTRAMUSCULAR; INTRAVENOUS EVERY 24 HOURS
Qty: 0 | Refills: 0 | Status: DISCONTINUED | OUTPATIENT
Start: 2018-06-23 | End: 2018-06-22

## 2018-06-22 RX ORDER — ERTAPENEM SODIUM 1 G/1
0 INJECTION, POWDER, LYOPHILIZED, FOR SOLUTION INTRAMUSCULAR; INTRAVENOUS
Qty: 0 | Refills: 0 | COMMUNITY
Start: 2018-06-22

## 2018-06-22 RX ADMIN — MEROPENEM 100 MILLIGRAM(S): 1 INJECTION INTRAVENOUS at 05:45

## 2018-06-22 RX ADMIN — ERTAPENEM SODIUM 120 MILLIGRAM(S): 1 INJECTION, POWDER, LYOPHILIZED, FOR SOLUTION INTRAMUSCULAR; INTRAVENOUS at 14:51

## 2018-06-22 RX ADMIN — MEROPENEM 100 MILLIGRAM(S): 1 INJECTION INTRAVENOUS at 13:43

## 2018-06-22 RX ADMIN — Medication 81 MILLIGRAM(S): at 11:06

## 2018-06-22 RX ADMIN — Medication 5 MILLIGRAM(S): at 11:06

## 2018-06-22 RX ADMIN — HEPARIN SODIUM 5000 UNIT(S): 5000 INJECTION INTRAVENOUS; SUBCUTANEOUS at 05:45

## 2018-06-22 NOTE — DISCHARGE NOTE ADULT - HOSPITAL COURSE
78 year old male with hx. prostate ca, s/p previous nephrostomy tubes, who had right nephrostomy fall out last nightand went to [facility] with fever to 102 and was sent to ED. In ed febrile, borderline bp, ct showed right hydronephrosis.  He was admitted to CCU. Patient went to IR and had placement of Nephrostomy tube.  Urine culture enterococcus, esbl  off pressors.  Day 4, pt transferred to medicine. Pt adamant about going home vs signing out AMA due to issues with his roommate. ID helped to arrange outpatient IV abx to facilitate discharge. Pt is aware of better nephrostomy tube care.      Septic shock from dislodged nephrostomy tube and underlying ESBL Enterococcus UTI     esbl, enterococcus on meropenem     Day 5 abx, to change to invanz to go home to complete 9 more days.     ambulate with PT    *hypokalemia, hypophosphetemia  -repleted    total time, including coordination of care: 40 minutes, including discussion with CRISTÓBAL REYNAGA  GEN: NAD  EYES: eomi  CV: no edema  RESP: unlabored

## 2018-06-22 NOTE — CHART NOTE - NSCHARTNOTEFT_GEN_A_CORE
Upon Nutritional Assessment by the Registered Dietitian your patient was determined to meet criteria / has evidence of the following diagnosis/diagnoses:          [ ]  Mild Protein Calorie Malnutrition        [ ]  Moderate Protein Calorie Malnutrition        [x ] Severe Protein Calorie Malnutrition        [ ] Unspecified Protein Calorie Malnutrition        [ ] Underweight / BMI <19        [ ] Morbid Obesity / BMI > 40      Findings as based on:  •  Comprehensive nutrition assessment and consultation  •  Calorie counts (nutrient intake analysis)  •  Food acceptance and intake status from observations by staff  •  Follow up  •  Patient education  •  Intervention secondary to interdisciplinary rounds  •   concerns      Treatment:    1) Ensure enlive 8oz po BID   2) Continue to encourage optimal po by offering food preferences, and snacks daily.   3) monitor weights weekly   4) Continue to monitor labs/lytes.   5) Consider thiamine supplement pt possibly due to long term inadequate intake.     The following diet has been recommended:  Continue with regular diet     PROVIDER Section:     By signing this assessment you are acknowledging and agree with the diagnosis/diagnoses assigned by the Registered Dietitian    Comments:

## 2018-06-22 NOTE — DIETITIAN INITIAL EVALUATION ADULT. - NS AS NUTRI DX NUTRIENT
Pt meets criteria for severe protein/calorie malnutrition in context of acute illness secondary to severe muscle/fat loss./Malnutrition

## 2018-06-22 NOTE — DISCHARGE NOTE ADULT - PLAN OF CARE
to be free of infection antibiotics as directed.  tube care per home health.   follow up with urology regarding nephrostomy tubes in 2 weeks.

## 2018-06-22 NOTE — ADVANCED PRACTICE NURSE CONSULT - ASSESSMENT
Pt awake and alert. Risks and benefits of midline catheter explained, verbal consent obtained.  BARD powerglide midline, 18g 10cm, lot# [id number] placed in left basilic vein under ultrasound guidance. Sterile field maintained throughout insertion. Minimal blood loss, flushes well with 20ml normal saline, brisk blood return, OK to use.

## 2018-06-22 NOTE — DIETITIAN INITIAL EVALUATION ADULT. - ENERGY NEEDS
Ht. 71     "        Wt. 158   #              BMI 22                 IBW  162  #               Pt is at   98 %  IBW

## 2018-06-22 NOTE — DISCHARGE NOTE ADULT - PATIENT PORTAL LINK FT
You can access the [facility], offered by [facility], by registering with the following website: [email or web address]

## 2018-06-22 NOTE — PROGRESS NOTE ADULT - PROVIDER SPECIALTY LIST ADULT
Critical Care
Infectious Disease
Critical Care

## 2018-06-22 NOTE — DISCHARGE NOTE ADULT - MEDICATION SUMMARY - MEDICATIONS TO TAKE
I will START or STAY ON the medications listed below when I get home from the hospital:    aspirin 81 mg oral tablet, chewable  -- 1 tab(s) by mouth once a day  -- Indication: For heart health    ertapenem 1 g injection  --  injectable   -- Indication: For antibiotic- to complete 9 additional days at home    oxybutynin 5 mg oral tablet  -- 1 tab(s) by mouth 3 times a day, As Needed  -- Indication: For bladder

## 2018-06-22 NOTE — DIETITIAN INITIAL EVALUATION ADULT. - OTHER INFO
Nutritition assessment 2/2 to LOS. Pt is a 79yo male admitted with nephrostomy tube displaced/ UTI. History of colon obstruction, prostate CA, gallstones, AAA. Pt consuming ~75% of meals at this time. PTA no diet followed, however when po poor pt consumed up to 3 ensure supplements a day. Denies any n/v/d/c. Skin intact with no edema documented. Jian score= 19. Pt reports non significant slowly declining weights x 2 years (19#/10%). Pt may be d/c today as per RN. Labs indicate possible risk of refeeding syndrome ( K+, Phosphorus, and Magnesium - low). NFPE indicates severe muscle wasting: temporal, clavicle, scapula, calves, patella, and quads. Severe fat loss: Ocular, ribs, and triceps. Pt meets criteria for severe protein/calorie malnutrition in context of acute illness secondary to severe muscle/fat loss. Recommendations: 1) Ensure enlive 8oz po BID 2) Continue to encourage optimal po by offering food preferences, and snacks daily. 3) monitor weights weekly 4) Continue to monitor labs/lytes. 5) Consider thiamine supplement pt possibly due to long term inadequate intake.

## 2018-06-22 NOTE — DISCHARGE NOTE ADULT - CARE PLAN
Principal Discharge DX:	Nephrostomy tube displaced  Goal:	to be free of infection  Assessment and plan of treatment:	antibiotics as directed.  tube care per home health.   follow up with urology regarding nephrostomy tubes in 2 weeks.

## 2018-06-22 NOTE — DISCHARGE NOTE ADULT - ADDITIONAL INSTRUCTIONS
PCP- follow up in 1 week. Bring these papers with you to that appointment so your PCP can request records from your hospital stay.

## 2018-06-22 NOTE — PROGRESS NOTE ADULT - ASSESSMENT
1. Septic shock from dislodged nephrostomy tube     esbl, enterococcus on meropenem     Day 4 abx     continue     transfer to floor     ambulate with PT
78 year old male with hx. prostate ca c/b obstructive uropathy, s/p previous bl nephrostomy tubes admitted 6/16 after right nephrostomy fell out night prior to admit  He went to  health with fever to 102 and was sent to ED. In ER Noted with temps to 101-102, hypotensive, elevated wbc ct 12-->17, positive UA, Ct abd/pelvis with T12 compression fx/enlarged prostate/thickened bladder UPJ obstruction and R hydro eval by IR and R nephrostomy inserted, was IV vanco/cefepime and on meropenem, blood cx growing Ecoli, is in CCU on pressor support.     1. Sepsis with E.coli.  Septic shock improving. Prostate ca w/ obstructive uropathy s/p b/l nephrosotmy tubes. UTI with E.coli and possible enterococcus spp. VIJAY  - leukocytosis  -renal function improving  -hemodinamically improved  - on meropenem 1 mg q12h # 2  -tolerating abx well so far; no side effects noted  - has ecoli growing in blood cx f/u urine cx and sensitivities  - continue with antibiotic coverage  - f/u cbc  - monitor temps  - supportive care    2. other issues -care per medicine
77yo M suffering from severe sepsis and septic shock due to RIGHT hydro/pyelonephritis after RIGHT nephrostomy tube fell out.  now also s/p replacement of LEFT nephrostomy on 6/19  VIJAY - improving with Tx  hypernatremia - improved with TX.  acute hypoxic respiratory failure due to septic sequelae - Improved with tx.  gram negative bacteremia with E coli  Urinary E coli ESBL and E faecalis   Urinary tract infection cause of severe sepsis and septic shock    Plan at this time  continue meropenem given ESBL E coli, E faecalis.  hydration  titrate O2  diet  Mobilize  GI and DVT prophylaxis as appropriate  ID eval and tx appreciated  PT and mobilize  supportive care    issues and plans d/w patient.  All questions answered.
78 year old male with h/o prostate ca c/b obstructive uropathy, s/p previous bl nephrostomy tubes admitted 6/16 after right nephrostomy fell out night prior to admit  [facility] fever to 102 and was sent to ED. In ER Noted with temps to 101-102, hypotensive, elevated wbc ct 12-->17, positive UA, Ct abd/pelvis with T12 compression fx/enlarged prostate/thickened bladder UPJ obstruction and R hydro eval by IR and R nephrostomy inserted, was IV vanco/cefepime and on meropenem, blood cx growing Ecoli, is in CCU on pressor support.     1. Sepsis with ESBL E.coli improving. Prostate ca w/ obstructive uropathy s/p b/l nephrosotmy tubes. UTI with ESBL E.coli and possible enterococcus spp.  -more comfortable  - leukocytosis resolved  -renal function improving  -hemodinamically improved  - on meropenem 1 mg q12h # 5  -tolerating abx well so far; no side effects noted  - continue with antibiotic coverage  - f/u repeat BC x 2  - f/u cbc  - monitor temps  - supportive care    2. other issues -care per medicine
78 year old male with h/o prostate ca c/b obstructive uropathy, s/p previous bl nephrostomy tubes admitted 6/16 after right nephrostomy fell out night prior to admit  [facility] with fever to 102 and was sent to ED. In ER Noted with temps to 101-102, hypotensive, elevated wbc ct 12-->17, positive UA, Ct abd/pelvis with T12 compression fx/enlarged prostate/thickened bladder UPJ obstruction and R hydro eval by IR and R nephrostomy inserted, was IV vanco/cefepime and on meropenem, blood cx growing Ecoli, is in CCU on pressor support.     1. Sepsis with ESBL E.coli improving. Prostate ca w/ obstructive uropathy s/p b/l nephrosotmy tubes. UTI with ESBL E.coli and possible enterococcus spp.  -off pressors  - leukocytosis resolved  -renal function improving  -hemodinamically improved  - on meropenem 1 mg q12h # 4  -tolerating abx well so far; no side effects noted  - continue with antibiotic coverage  -repeat BC x 2  - f/u cbc  - monitor temps  - supportive care    2. other issues -care per medicine
78 year old male with h/o prostate ca c/b obstructive uropathy, s/p previous bl nephrostomy tubes admitted 6/16 after right nephrostomy fell out night prior to admit  [facility] with fever to 102 and was sent to ED. In ER Noted with temps to 101-102, hypotensive, elevated wbc ct 12-->17, positive UA, Ct abd/pelvis with T12 compression fx/enlarged prostate/thickened bladder UPJ obstruction and R hydro eval by IR and R nephrostomy inserted, was IV vanco/cefepime and on meropenem, blood cx growing Ecoli, is in CCU on pressor support.     1. Sepsis with ESBL E.coli improving. Prostate ca w/ obstructive uropathy s/p b/l nephrosotmy tubes. UTI with ESBL E.coli and possible enterococcus spp. VIJAY  -still on pressors  - leukocytosis resolved  -renal function improving  -hemodinamically improved  - on meropenem 1 mg q12h # 3  -tolerating abx well so far; no side effects noted  -increase meropenem to 1 gm IV q8h  - continue with antibiotic coverage  - f/u cbc  - monitor temps  - supportive care    2. other issues -care per medicine
78 year old male with hx. prostate ca c/b obstructive uropathy, s/p previous bl nephrostomy tubes admitted 6/16 after right nephrostomy fell out night prior to admit  He went to  health with fever to 102 and was sent to ED. In ER Noted with temps to 101-102, hypotensive, elevated wbc ct 12-->17, positive UA, Ct abd/pelvis with T12 compression fx/enlarged prostate/thickened bladder UPJ obstruction and R hydro eval by IR and R nephrostomy inserted, was IV vanco/cefepime and on meropenem, blood cx growing Ecoli, is in CCU on pressor support.     1. Sepsis with ESBL E.coli improving. Prostate ca w/ obstructive uropathy s/p b/l nephrosotmy tubes. UTI with ESBL E.coli and possible enterococcus spp. VIJAY  - leukocytosis  -renal function improving  -hemodinamically improved  - on meropenem 1 mg q12h # 3  -tolerating abx well so far; no side effects noted  - continue with antibiotic coverage  - f/u cbc  - monitor temps  - supportive care    2. other issues -care per medicine
79yo M suffering from severe sepsis and septic shock due to RIGHT hydro/pyelonephritis after RIGHT nephrostomy tube fell out.  VIJAY - improving with Tx  hypernatremia - improved with TX.  acute hypoxic respiratory failure due to septic sequelae - Improved with tx.  gram negative bacteremia with E coli  Urinary E coli and E faecalis   Urinary tract infection cause of severe sepsis and septic shock    Plan at this time  received  vanco & cefepime, will continue with meropenem given risk of resistant organisms  hydration  titrate O2  diet  Mobilize  GI and DVT prophylaxis as appropriate  ID eval and tx appreciated  supportive care    issues and plans d/w patient.  All questions answered.
1. Septic shock from dislodges nephrostomy tube     on Day 4 Meropenem, cx positive enterococcus, esbl sensitive     improving'     titrate off phenylephrine     Diet     oob to chair
77yo M suffering from severe sepsis and septic shock due to RIGHT hydro/pyelonephritis after RIGHT nephrostomy tube fell out.  VIJAY - improving with Tx  hypernatremia  acute hypoxic respiratory failure due to septic sequelae   gram negative bacteremia - urinary source/UTI    Plan at this time  received  vanco & cefepime, will continue with meropenem given risk of resistant organisms  hydration  titrate O2  diet  Mobilize  GI and DVT prophylaxis as appropritae  f/u Cultures, abx as appropriate  ID eval  supportive care    issues and plans d/w patient.  All questinos answered.

## 2018-06-27 DIAGNOSIS — N31.9 NEUROMUSCULAR DYSFUNCTION OF BLADDER, UNSPECIFIED: ICD-10-CM

## 2018-06-27 DIAGNOSIS — J96.00 ACUTE RESPIRATORY FAILURE, UNSPECIFIED WHETHER WITH HYPOXIA OR HYPERCAPNIA: ICD-10-CM

## 2018-06-27 DIAGNOSIS — E83.39 OTHER DISORDERS OF PHOSPHORUS METABOLISM: ICD-10-CM

## 2018-06-27 DIAGNOSIS — E87.0 HYPEROSMOLALITY AND HYPERNATREMIA: ICD-10-CM

## 2018-06-27 DIAGNOSIS — N13.6 PYONEPHROSIS: ICD-10-CM

## 2018-06-27 DIAGNOSIS — Z96.649 PRESENCE OF UNSPECIFIED ARTIFICIAL HIP JOINT: ICD-10-CM

## 2018-06-27 DIAGNOSIS — T83.022A DISPLACEMENT OF NEPHROSTOMY CATHETER, INITIAL ENCOUNTER: ICD-10-CM

## 2018-06-27 DIAGNOSIS — A41.51 SEPSIS DUE TO ESCHERICHIA COLI [E. COLI]: ICD-10-CM
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
## 2018-06-27 DIAGNOSIS — E87.6 HYPOKALEMIA: ICD-10-CM

## 2018-06-27 DIAGNOSIS — Z85.46 PERSONAL HISTORY OF MALIGNANT NEOPLASM OF PROSTATE: ICD-10-CM

## 2018-06-27 DIAGNOSIS — N99.521 INFECTION OF INCONTINENT EXTERNAL STOMA OF URINARY TRACT: ICD-10-CM

## 2018-06-27 DIAGNOSIS — Z90.49 ACQUIRED ABSENCE OF OTHER SPECIFIED PARTS OF DIGESTIVE TRACT: ICD-10-CM

## 2018-06-27 DIAGNOSIS — R65.21 SEVERE SEPSIS WITH SEPTIC SHOCK: ICD-10-CM

## 2018-06-27 DIAGNOSIS — N17.9 ACUTE KIDNEY FAILURE, UNSPECIFIED: ICD-10-CM

## 2018-06-27 DIAGNOSIS — E43 UNSPECIFIED SEVERE PROTEIN-CALORIE MALNUTRITION: ICD-10-CM

## 2018-06-27 DIAGNOSIS — B95.2 ENTEROCOCCUS AS THE CAUSE OF DISEASES CLASSIFIED ELSEWHERE: ICD-10-CM

## 2018-06-27 DIAGNOSIS — Z87.891 PERSONAL HISTORY OF NICOTINE DEPENDENCE: ICD-10-CM

## 2018-06-27 DIAGNOSIS — R50.9 FEVER, UNSPECIFIED: ICD-10-CM

## 2018-06-27 LAB
CULTURE RESULTS: SIGNIFICANT CHANGE UP
SPECIMEN SOURCE: SIGNIFICANT CHANGE UP

## 2018-09-27 ENCOUNTER — INPATIENT (INPATIENT)
Facility: HOSPITAL | Age: 78
LOS: 0 days | Discharge: LEFT BEFORE DISCHARGE | End: 2018-09-27
Attending: HOSPITALIST | Admitting: HOSPITALIST
Payer: MEDICARE

## 2018-09-27 VITALS
HEIGHT: 70 IN | RESPIRATION RATE: 19 BRPM | TEMPERATURE: 99 F | OXYGEN SATURATION: 98 % | WEIGHT: 138.01 LBS | SYSTOLIC BLOOD PRESSURE: 91 MMHG | HEART RATE: 88 BPM | DIASTOLIC BLOOD PRESSURE: 57 MMHG

## 2018-09-27 VITALS
TEMPERATURE: 98 F | DIASTOLIC BLOOD PRESSURE: 87 MMHG | RESPIRATION RATE: 18 BRPM | OXYGEN SATURATION: 96 % | HEART RATE: 76 BPM | SYSTOLIC BLOOD PRESSURE: 121 MMHG

## 2018-09-27 DIAGNOSIS — Z90.49 ACQUIRED ABSENCE OF OTHER SPECIFIED PARTS OF DIGESTIVE TRACT: Chronic | ICD-10-CM

## 2018-09-27 DIAGNOSIS — S22.32XA FRACTURE OF ONE RIB, LEFT SIDE, INITIAL ENCOUNTER FOR CLOSED FRACTURE: Chronic | ICD-10-CM

## 2018-09-27 DIAGNOSIS — Z98.890 OTHER SPECIFIED POSTPROCEDURAL STATES: Chronic | ICD-10-CM

## 2018-09-27 DIAGNOSIS — Z98.41 CATARACT EXTRACTION STATUS, RIGHT EYE: Chronic | ICD-10-CM

## 2018-09-27 DIAGNOSIS — Z98.89 OTHER SPECIFIED POSTPROCEDURAL STATES: Chronic | ICD-10-CM

## 2018-09-27 PROBLEM — N31.9 NEUROMUSCULAR DYSFUNCTION OF BLADDER, UNSPECIFIED: Chronic | Status: ACTIVE | Noted: 2017-06-07

## 2018-09-27 PROBLEM — K56.60 UNSPECIFIED INTESTINAL OBSTRUCTION: Chronic | Status: ACTIVE | Noted: 2017-06-07

## 2018-09-27 PROBLEM — C61 MALIGNANT NEOPLASM OF PROSTATE: Chronic | Status: ACTIVE | Noted: 2017-06-07

## 2018-09-27 PROBLEM — K80.20 CALCULUS OF GALLBLADDER WITHOUT CHOLECYSTITIS WITHOUT OBSTRUCTION: Chronic | Status: ACTIVE | Noted: 2017-06-07

## 2018-09-27 LAB
ALBUMIN SERPL ELPH-MCNC: 2.9 G/DL — LOW (ref 3.3–5)
ALP SERPL-CCNC: 128 U/L — HIGH (ref 40–120)
ALT FLD-CCNC: 17 U/L — SIGNIFICANT CHANGE UP (ref 12–78)
ANION GAP SERPL CALC-SCNC: 9 MMOL/L — SIGNIFICANT CHANGE UP (ref 5–17)
APTT BLD: 30.3 SEC — SIGNIFICANT CHANGE UP (ref 27.5–37.4)
AST SERPL-CCNC: 31 U/L — SIGNIFICANT CHANGE UP (ref 15–37)
BASOPHILS # BLD AUTO: 0.05 K/UL — SIGNIFICANT CHANGE UP (ref 0–0.2)
BASOPHILS NFR BLD AUTO: 0.4 % — SIGNIFICANT CHANGE UP (ref 0–2)
BILIRUB SERPL-MCNC: 0.7 MG/DL — SIGNIFICANT CHANGE UP (ref 0.2–1.2)
BUN SERPL-MCNC: 28 MG/DL — HIGH (ref 7–23)
CALCIUM SERPL-MCNC: 9.3 MG/DL — SIGNIFICANT CHANGE UP (ref 8.5–10.1)
CHLORIDE SERPL-SCNC: 101 MMOL/L — SIGNIFICANT CHANGE UP (ref 96–108)
CO2 SERPL-SCNC: 27 MMOL/L — SIGNIFICANT CHANGE UP (ref 22–31)
CREAT SERPL-MCNC: 1.71 MG/DL — HIGH (ref 0.5–1.3)
EOSINOPHIL # BLD AUTO: 0 K/UL — SIGNIFICANT CHANGE UP (ref 0–0.5)
EOSINOPHIL NFR BLD AUTO: 0 % — SIGNIFICANT CHANGE UP (ref 0–6)
GLUCOSE SERPL-MCNC: 146 MG/DL — HIGH (ref 70–99)
HCT VFR BLD CALC: 36.9 % — LOW (ref 39–50)
HGB BLD-MCNC: 11.6 G/DL — LOW (ref 13–17)
IMM GRANULOCYTES NFR BLD AUTO: 1 % — SIGNIFICANT CHANGE UP (ref 0–1.5)
INR BLD: 1.6 RATIO — HIGH (ref 0.88–1.16)
LYMPHOCYTES # BLD AUTO: 0.5 K/UL — LOW (ref 1–3.3)
LYMPHOCYTES # BLD AUTO: 3.7 % — LOW (ref 13–44)
MCHC RBC-ENTMCNC: 26.9 PG — LOW (ref 27–34)
MCHC RBC-ENTMCNC: 31.4 GM/DL — LOW (ref 32–36)
MCV RBC AUTO: 85.4 FL — SIGNIFICANT CHANGE UP (ref 80–100)
MONOCYTES # BLD AUTO: 1.12 K/UL — HIGH (ref 0–0.9)
MONOCYTES NFR BLD AUTO: 8.2 % — SIGNIFICANT CHANGE UP (ref 2–14)
NEUTROPHILS # BLD AUTO: 11.83 K/UL — HIGH (ref 1.8–7.4)
NEUTROPHILS NFR BLD AUTO: 86.7 % — HIGH (ref 43–77)
NRBC # BLD: 0 /100 WBCS — SIGNIFICANT CHANGE UP (ref 0–0)
PLATELET # BLD AUTO: 196 K/UL — SIGNIFICANT CHANGE UP (ref 150–400)
POTASSIUM SERPL-MCNC: 4.7 MMOL/L — SIGNIFICANT CHANGE UP (ref 3.5–5.3)
POTASSIUM SERPL-SCNC: 4.7 MMOL/L — SIGNIFICANT CHANGE UP (ref 3.5–5.3)
PROT SERPL-MCNC: 7.1 GM/DL — SIGNIFICANT CHANGE UP (ref 6–8.3)
PROTHROM AB SERPL-ACNC: 17.5 SEC — HIGH (ref 9.8–12.7)
RBC # BLD: 4.32 M/UL — SIGNIFICANT CHANGE UP (ref 4.2–5.8)
RBC # FLD: 15.3 % — HIGH (ref 10.3–14.5)
SODIUM SERPL-SCNC: 137 MMOL/L — SIGNIFICANT CHANGE UP (ref 135–145)
TROPONIN I SERPL-MCNC: <0.015 NG/ML — SIGNIFICANT CHANGE UP (ref 0.01–0.04)
TROPONIN I SERPL-MCNC: <0.015 NG/ML — SIGNIFICANT CHANGE UP (ref 0.01–0.04)
WBC # BLD: 13.63 K/UL — HIGH (ref 3.8–10.5)
WBC # FLD AUTO: 13.63 K/UL — HIGH (ref 3.8–10.5)

## 2018-09-27 PROCEDURE — 99285 EMERGENCY DEPT VISIT HI MDM: CPT

## 2018-09-27 PROCEDURE — 93010 ELECTROCARDIOGRAM REPORT: CPT

## 2018-09-27 PROCEDURE — 71045 X-RAY EXAM CHEST 1 VIEW: CPT | Mod: 26

## 2018-09-27 RX ORDER — ASPIRIN/CALCIUM CARB/MAGNESIUM 324 MG
162 TABLET ORAL ONCE
Qty: 0 | Refills: 0 | Status: COMPLETED | OUTPATIENT
Start: 2018-09-27 | End: 2018-09-27

## 2018-09-27 RX ORDER — SODIUM CHLORIDE 9 MG/ML
1000 INJECTION INTRAMUSCULAR; INTRAVENOUS; SUBCUTANEOUS ONCE
Qty: 0 | Refills: 0 | Status: COMPLETED | OUTPATIENT
Start: 2018-09-27 | End: 2018-09-27

## 2018-09-27 RX ORDER — HYDROMORPHONE HYDROCHLORIDE 2 MG/ML
1 INJECTION INTRAMUSCULAR; INTRAVENOUS; SUBCUTANEOUS ONCE
Qty: 0 | Refills: 0 | Status: DISCONTINUED | OUTPATIENT
Start: 2018-09-27 | End: 2018-09-27

## 2018-09-27 RX ORDER — ASPIRIN/CALCIUM CARB/MAGNESIUM 324 MG
81 TABLET ORAL DAILY
Qty: 0 | Refills: 0 | Status: DISCONTINUED | OUTPATIENT
Start: 2018-09-27 | End: 2018-09-27

## 2018-09-27 RX ADMIN — HYDROMORPHONE HYDROCHLORIDE 1 MILLIGRAM(S): 2 INJECTION INTRAMUSCULAR; INTRAVENOUS; SUBCUTANEOUS at 07:31

## 2018-09-27 RX ADMIN — HYDROMORPHONE HYDROCHLORIDE 1 MILLIGRAM(S): 2 INJECTION INTRAMUSCULAR; INTRAVENOUS; SUBCUTANEOUS at 07:50

## 2018-09-27 RX ADMIN — SODIUM CHLORIDE 1000 MILLILITER(S): 9 INJECTION INTRAMUSCULAR; INTRAVENOUS; SUBCUTANEOUS at 11:18

## 2018-09-27 RX ADMIN — SODIUM CHLORIDE 1000 MILLILITER(S): 9 INJECTION INTRAMUSCULAR; INTRAVENOUS; SUBCUTANEOUS at 10:17

## 2018-09-27 RX ADMIN — Medication 162 MILLIGRAM(S): at 08:02

## 2018-09-27 NOTE — ED ADULT NURSE NOTE - OBJECTIVE STATEMENT
Pt presented to ED c/o severe midsternal chest pain and SOB that started 2 hours PTA. Pt states he took 2 "glycerins at home". Received 3 baby aspirins en route via EMS. Upon arrival, 12 lead ekg performed. Placed on tele monitor NSR, spO2 monitoring 98% on room air. History of prostate cancer, not on chemo or radiation, b/l nephrostomy tubes.

## 2018-09-27 NOTE — ED ADULT NURSE NOTE - NSIMPLEMENTINTERV_GEN_ALL_ED
Implemented All Fall Risk Interventions:  [place] to call system. Call bell, personal items and telephone within reach. Instruct patient to call for assistance. Room bathroom lighting operational. Non-slip footwear when patient is off stretcher. Physically safe environment: no spills, clutter or unnecessary equipment. Stretcher in lowest position, wheels locked, appropriate side rails in place. Provide visual cue, wrist band, yellow gown, etc. Monitor gait and stability. Monitor for mental status changes and reorient to person, place, and time. Review medications for side effects contributing to fall risk. Reinforce activity limits and safety measures with patient and family.

## 2018-09-27 NOTE — ED PROVIDER NOTE - OBJECTIVE STATEMENT
78M hx prostate ca s/p bilateral nephrostomy tubes, CAD (mild diffuse dz LAD on cath 2/2017), crack cocaine use here c/o midsternal chest pain after smoking crack this am.  No n/v. No diaph. No dyspnea.  Pain = pressure

## 2018-09-27 NOTE — ED PROVIDER NOTE - PROGRESS NOTE DETAILS
trop #1 neg. Creatinine elevated 0.7->1.7  Will admit Patient wishes to sign out AMA.  The pt is clinically sober, A&Ox3, free from distracting injury.  Throughout our interactions in the ED today, the pt has demonstrated concrete thinking/reasoning, has maintained an orderly/reasonable conversation, appears to have intact insight/judgment/reason, a sound mind and therefore in our opinion has capacity now to make decisions.  Given the pt’s presentation, we communicated (in laymen's terms) our concerns.  The pt verbalized an understanding of our worries.  We’ve communicated to the patient that the ED evaluation is incomplete & many troublesome conditions haven’t been r/o.  We have discussed the need for further ED w/u so we can get more information about the pt’s condition.  We have discussed the range of possible dx, potential testing & tx options.  Our discussions included the potential outcomes of leaving AMA, including worsening of their condition, becoming permanently disabled/in pain/critically ill, or death.  Despite these efforts, we were unable to convince the pt to stay.  The pt is refusing any further care and is leaving against medical advice. We have attempted to offer tx/rx/guidance for any dangerous conditions which are most likely and/or dangerous.  We have answered all questions and have implored the pt to return ASAP to complete the w/u.

## 2018-09-27 NOTE — INPATIENT CERTIFICATION FOR MEDICARE PATIENTS - RISKS OF ADVERSE EVENTS
Concern for worsening infectious process/Concern for delay in diagnosis and treatment/Concern for neurologic deterioration/Concern for renal deterioration/Concern for cardiopulmonary deterioration

## 2018-10-04 DIAGNOSIS — I65.23 OCCLUSION AND STENOSIS OF BILATERAL CAROTID ARTERIES: ICD-10-CM

## 2018-10-04 DIAGNOSIS — R07.9 CHEST PAIN, UNSPECIFIED: ICD-10-CM

## 2018-10-04 DIAGNOSIS — Z85.46 PERSONAL HISTORY OF MALIGNANT NEOPLASM OF PROSTATE: ICD-10-CM

## 2018-10-04 DIAGNOSIS — N31.9 NEUROMUSCULAR DYSFUNCTION OF BLADDER, UNSPECIFIED: ICD-10-CM

## 2018-10-04 DIAGNOSIS — I71.4 ABDOMINAL AORTIC ANEURYSM, WITHOUT RUPTURE: ICD-10-CM

## 2018-10-04 DIAGNOSIS — F14.99 COCAINE USE, UNSPECIFIED WITH UNSPECIFIED COCAINE-INDUCED DISORDER: ICD-10-CM

## 2018-10-04 DIAGNOSIS — Z90.49 ACQUIRED ABSENCE OF OTHER SPECIFIED PARTS OF DIGESTIVE TRACT: ICD-10-CM

## 2018-10-04 DIAGNOSIS — I25.10 ATHEROSCLEROTIC HEART DISEASE OF NATIVE CORONARY ARTERY WITHOUT ANGINA PECTORIS: ICD-10-CM

## 2018-10-04 DIAGNOSIS — Z96.649 PRESENCE OF UNSPECIFIED ARTIFICIAL HIP JOINT: ICD-10-CM

## 2018-10-04 DIAGNOSIS — Z79.82 LONG TERM (CURRENT) USE OF ASPIRIN: ICD-10-CM

## 2018-11-14 ENCOUNTER — INPATIENT (INPATIENT)
Facility: HOSPITAL | Age: 78
LOS: 3 days | Discharge: HOME | End: 2018-11-18
Attending: HOSPITALIST | Admitting: HOSPITALIST
Payer: MEDICARE

## 2018-11-14 ENCOUNTER — TRANSCRIPTION ENCOUNTER (OUTPATIENT)
Age: 78
End: 2018-11-14

## 2018-11-14 VITALS
SYSTOLIC BLOOD PRESSURE: 130 MMHG | HEIGHT: 70 IN | WEIGHT: 139.99 LBS | OXYGEN SATURATION: 97 % | DIASTOLIC BLOOD PRESSURE: 93 MMHG | RESPIRATION RATE: 20 BRPM | TEMPERATURE: 98 F | HEART RATE: 115 BPM

## 2018-11-14 DIAGNOSIS — Z98.41 CATARACT EXTRACTION STATUS, RIGHT EYE: Chronic | ICD-10-CM

## 2018-11-14 DIAGNOSIS — S22.32XA FRACTURE OF ONE RIB, LEFT SIDE, INITIAL ENCOUNTER FOR CLOSED FRACTURE: Chronic | ICD-10-CM

## 2018-11-14 DIAGNOSIS — Z98.890 OTHER SPECIFIED POSTPROCEDURAL STATES: Chronic | ICD-10-CM

## 2018-11-14 DIAGNOSIS — Z90.49 ACQUIRED ABSENCE OF OTHER SPECIFIED PARTS OF DIGESTIVE TRACT: Chronic | ICD-10-CM

## 2018-11-14 DIAGNOSIS — Z98.89 OTHER SPECIFIED POSTPROCEDURAL STATES: Chronic | ICD-10-CM

## 2018-11-14 LAB
ALBUMIN SERPL ELPH-MCNC: 2.8 G/DL — LOW (ref 3.3–5)
ALP SERPL-CCNC: 262 U/L — HIGH (ref 40–120)
ALT FLD-CCNC: 15 U/L — SIGNIFICANT CHANGE UP (ref 12–78)
ANION GAP SERPL CALC-SCNC: 9 MMOL/L — SIGNIFICANT CHANGE UP (ref 5–17)
AST SERPL-CCNC: 25 U/L — SIGNIFICANT CHANGE UP (ref 15–37)
BASOPHILS # BLD AUTO: 0.02 K/UL — SIGNIFICANT CHANGE UP (ref 0–0.2)
BASOPHILS NFR BLD AUTO: 0.3 % — SIGNIFICANT CHANGE UP (ref 0–2)
BILIRUB SERPL-MCNC: 0.4 MG/DL — SIGNIFICANT CHANGE UP (ref 0.2–1.2)
BUN SERPL-MCNC: 27 MG/DL — HIGH (ref 7–23)
CALCIUM SERPL-MCNC: 8.7 MG/DL — SIGNIFICANT CHANGE UP (ref 8.5–10.1)
CHLORIDE SERPL-SCNC: 104 MMOL/L — SIGNIFICANT CHANGE UP (ref 96–108)
CK SERPL-CCNC: 192 U/L — SIGNIFICANT CHANGE UP (ref 26–308)
CO2 SERPL-SCNC: 26 MMOL/L — SIGNIFICANT CHANGE UP (ref 22–31)
CREAT SERPL-MCNC: 1.23 MG/DL — SIGNIFICANT CHANGE UP (ref 0.5–1.3)
EOSINOPHIL # BLD AUTO: 0.02 K/UL — SIGNIFICANT CHANGE UP (ref 0–0.5)
EOSINOPHIL NFR BLD AUTO: 0.3 % — SIGNIFICANT CHANGE UP (ref 0–6)
GLUCOSE SERPL-MCNC: 121 MG/DL — HIGH (ref 70–99)
HCT VFR BLD CALC: 32.9 % — LOW (ref 39–50)
HGB BLD-MCNC: 10.3 G/DL — LOW (ref 13–17)
IMM GRANULOCYTES NFR BLD AUTO: 1.3 % — SIGNIFICANT CHANGE UP (ref 0–1.5)
LYMPHOCYTES # BLD AUTO: 0.58 K/UL — LOW (ref 1–3.3)
LYMPHOCYTES # BLD AUTO: 8.3 % — LOW (ref 13–44)
MCHC RBC-ENTMCNC: 27.1 PG — SIGNIFICANT CHANGE UP (ref 27–34)
MCHC RBC-ENTMCNC: 31.3 GM/DL — LOW (ref 32–36)
MCV RBC AUTO: 86.6 FL — SIGNIFICANT CHANGE UP (ref 80–100)
MONOCYTES # BLD AUTO: 0.69 K/UL — SIGNIFICANT CHANGE UP (ref 0–0.9)
MONOCYTES NFR BLD AUTO: 9.9 % — SIGNIFICANT CHANGE UP (ref 2–14)
NEUTROPHILS # BLD AUTO: 5.58 K/UL — SIGNIFICANT CHANGE UP (ref 1.8–7.4)
NEUTROPHILS NFR BLD AUTO: 79.9 % — HIGH (ref 43–77)
NRBC # BLD: 0 /100 WBCS — SIGNIFICANT CHANGE UP (ref 0–0)
PLATELET # BLD AUTO: 175 K/UL — SIGNIFICANT CHANGE UP (ref 150–400)
POTASSIUM SERPL-MCNC: 3.9 MMOL/L — SIGNIFICANT CHANGE UP (ref 3.5–5.3)
POTASSIUM SERPL-SCNC: 3.9 MMOL/L — SIGNIFICANT CHANGE UP (ref 3.5–5.3)
PROT SERPL-MCNC: 6.7 GM/DL — SIGNIFICANT CHANGE UP (ref 6–8.3)
RBC # BLD: 3.8 M/UL — LOW (ref 4.2–5.8)
RBC # FLD: 16.8 % — HIGH (ref 10.3–14.5)
SODIUM SERPL-SCNC: 139 MMOL/L — SIGNIFICANT CHANGE UP (ref 135–145)
TROPONIN I SERPL-MCNC: <0.015 NG/ML — SIGNIFICANT CHANGE UP (ref 0.01–0.04)
WBC # BLD: 6.98 K/UL — SIGNIFICANT CHANGE UP (ref 3.8–10.5)
WBC # FLD AUTO: 6.98 K/UL — SIGNIFICANT CHANGE UP (ref 3.8–10.5)

## 2018-11-14 PROCEDURE — 71045 X-RAY EXAM CHEST 1 VIEW: CPT | Mod: 26

## 2018-11-14 PROCEDURE — 93010 ELECTROCARDIOGRAM REPORT: CPT

## 2018-11-14 PROCEDURE — 71275 CT ANGIOGRAPHY CHEST: CPT | Mod: 26

## 2018-11-14 PROCEDURE — 99285 EMERGENCY DEPT VISIT HI MDM: CPT

## 2018-11-14 PROCEDURE — 99221 1ST HOSP IP/OBS SF/LOW 40: CPT

## 2018-11-14 PROCEDURE — 74174 CTA ABD&PLVS W/CONTRAST: CPT | Mod: 26

## 2018-11-14 RX ORDER — ACETAMINOPHEN 500 MG
650 TABLET ORAL EVERY 6 HOURS
Qty: 0 | Refills: 0 | Status: DISCONTINUED | OUTPATIENT
Start: 2018-11-14 | End: 2018-11-18

## 2018-11-14 RX ORDER — SODIUM CHLORIDE 9 MG/ML
1000 INJECTION INTRAMUSCULAR; INTRAVENOUS; SUBCUTANEOUS ONCE
Qty: 0 | Refills: 0 | Status: COMPLETED | OUTPATIENT
Start: 2018-11-14 | End: 2018-11-14

## 2018-11-14 RX ORDER — PANTOPRAZOLE SODIUM 20 MG/1
40 TABLET, DELAYED RELEASE ORAL
Qty: 0 | Refills: 0 | Status: DISCONTINUED | OUTPATIENT
Start: 2018-11-14 | End: 2018-11-16

## 2018-11-14 RX ORDER — SODIUM CHLORIDE 9 MG/ML
1000 INJECTION INTRAMUSCULAR; INTRAVENOUS; SUBCUTANEOUS
Qty: 0 | Refills: 0 | Status: DISCONTINUED | OUTPATIENT
Start: 2018-11-14 | End: 2018-11-18

## 2018-11-14 RX ORDER — ASPIRIN/CALCIUM CARB/MAGNESIUM 324 MG
324 TABLET ORAL ONCE
Qty: 0 | Refills: 0 | Status: COMPLETED | OUTPATIENT
Start: 2018-11-14 | End: 2018-11-14

## 2018-11-14 RX ORDER — OXYBUTYNIN CHLORIDE 5 MG
5 TABLET ORAL
Qty: 0 | Refills: 0 | Status: DISCONTINUED | OUTPATIENT
Start: 2018-11-14 | End: 2018-11-18

## 2018-11-14 RX ORDER — HEPARIN SODIUM 5000 [USP'U]/ML
5000 INJECTION INTRAVENOUS; SUBCUTANEOUS EVERY 12 HOURS
Qty: 0 | Refills: 0 | Status: DISCONTINUED | OUTPATIENT
Start: 2018-11-14 | End: 2018-11-18

## 2018-11-14 RX ORDER — FAMOTIDINE 10 MG/ML
20 INJECTION INTRAVENOUS ONCE
Qty: 0 | Refills: 0 | Status: COMPLETED | OUTPATIENT
Start: 2018-11-14 | End: 2018-11-14

## 2018-11-14 RX ORDER — INFLUENZA VIRUS VACCINE 15; 15; 15; 15 UG/.5ML; UG/.5ML; UG/.5ML; UG/.5ML
0.5 SUSPENSION INTRAMUSCULAR ONCE
Qty: 0 | Refills: 0 | Status: COMPLETED | OUTPATIENT
Start: 2018-11-14 | End: 2018-11-18

## 2018-11-14 RX ORDER — ASPIRIN/CALCIUM CARB/MAGNESIUM 324 MG
81 TABLET ORAL DAILY
Qty: 0 | Refills: 0 | Status: DISCONTINUED | OUTPATIENT
Start: 2018-11-14 | End: 2018-11-18

## 2018-11-14 RX ADMIN — FAMOTIDINE 20 MILLIGRAM(S): 10 INJECTION INTRAVENOUS at 12:57

## 2018-11-14 RX ADMIN — SODIUM CHLORIDE 1000 MILLILITER(S): 9 INJECTION INTRAMUSCULAR; INTRAVENOUS; SUBCUTANEOUS at 13:15

## 2018-11-14 RX ADMIN — SODIUM CHLORIDE 1000 MILLILITER(S): 9 INJECTION INTRAMUSCULAR; INTRAVENOUS; SUBCUTANEOUS at 17:47

## 2018-11-14 RX ADMIN — SODIUM CHLORIDE 100 MILLILITER(S): 9 INJECTION INTRAMUSCULAR; INTRAVENOUS; SUBCUTANEOUS at 20:35

## 2018-11-14 NOTE — ED PROVIDER NOTE - PMH
AAA (abdominal aortic aneurysm)  2010  CAD (coronary artery disease)    Carotid stenosis  right and left  Colon obstruction    Gallstones    GERD (gastroesophageal reflux disease)    HLD (hyperlipidemia)    Neurogenic bladder    Prostate CA

## 2018-11-14 NOTE — ED PROVIDER NOTE - MEDICAL DECISION MAKING DETAILS
79 yo M hx of AAA, HLD, prostate CA, former smoker, two days ago ran out of GERD meds and snorted crack cocaine. BIBA from  regarding left pleuritic CP. Pt cachectic, no focal tenderness on exam. EKG, Labs including Utox, serial troponins, IV pepcid, pulse ox, ASA, CXR, CT A&p non con, CTA chest r/o PE if creatinine allows. Otherwise, consider VQ scan.

## 2018-11-14 NOTE — H&P ADULT - ASSESSMENT
78 y.o. male with PMH known aortic aneurysm, carotid stenosis, colonic obstruction s/p hemicolectomy, neurogenic bladder, prostate cancer presents with chest pain woke him up. Pt reports 7/10 substernal with radiation up the esophagus that is burning sensation. Denies other radiation. Currently, reports improvement with chest pain. Denies fever, chills, SOB, abd pain. Pt reports having b/l nephrostomy tubes and next change in 2 weeks. Reports on chemotherapy at the [facility] and doesn't remember the doctor's [facility]. [facility] No n/v. Pt reports taking crack cocaine 2 days ago and "a lot" in quantity.    #chest pain with recent cocaine use  #abnormal EKG  -admit to tele  -chest pain likely from GERD/acid reflux  -cont IV PPI BID  -r/o ACS, HAYDEE  -ASA, statin  -lipid panel  -EKG noted  -cardio consult, Dr Girard    #hypotension  -SBP 80s-90s, pt alert, oriented, awake  -pt reports SBP ~105 baseline  -will continue to IV fluid pt and monitor BP    #aortic aneurysm  -per CT report, stable    #prostate cancer on chemo  -pt reports getting chemo at [facility]. He reports that they didn't tell him anything  -CT noted with peritoneal carcinomatosis and new hepatic lobe lesion  -oncology consult, Dr Youssef    #advanced care planning  -discussed with patient regarding advanced directives/code status. Pt reports "I've seen people get chest compressions. I want to be DNR". Pt reports he doesn't want to be intubated either.  -pt is DNR/DNI  -time: 16 min    IMPROVE VTE Individual Risk Assessment    RISK                                                                Points    [  ] Previous VTE                                                  3    [  ] Thrombophilia                                               2    [  ] Lower limb paralysis                                      2        (unable to hold up >15 seconds)      [ x ] Current Cancer                                              2         (within 6 months)    [  ] Immobilization > 24 hrs                                1    [  ] ICU/CCU stay > 24 hours                              1    [ x ] Age > 60                                                      1    IMPROVE VTE Score _____3____

## 2018-11-14 NOTE — H&P ADULT - NSHPPHYSICALEXAM_GEN_ALL_CORE
Vital Signs Last 24 Hrs  T(C): 36.8 (14 Nov 2018 16:44), Max: 36.8 (14 Nov 2018 16:44)  T(F): 98.3 (14 Nov 2018 16:44), Max: 98.3 (14 Nov 2018 16:44)  HR: 95 (14 Nov 2018 18:33) (91 - 115)  BP: 97/70 (14 Nov 2018 18:33) (81/46 - 130/93)  BP(mean): --  RR: 17 (14 Nov 2018 18:33) (17 - 20)  SpO2: 99% (14 Nov 2018 18:33) (90% - 99%)    GEN: appears comfortable  Neuro: Alert, oriented, conversant, appropriate, CN grossly nonfocal  HEENT: NC/AT, EOMI  Neck: no thyroidmegaly, no JVD  Cardiovascular: S1S2 present, regular rhythm, no murmur  Respiratory: breath sounds diminished bilaterally, no wheezing, no rales, no rhonchi  Gastrointestinal: bowel sounds normal, soft, no abdominal tenderness, b/l nephrostomy tube  Musculoskeletal: no muscle tenderness  Extremities: No edema  Skin: No rash

## 2018-11-14 NOTE — ED PROVIDER NOTE - OBJECTIVE STATEMENT
Pt is a 79 y/o M, with PMHx of AAA, colon obsutruction, Gallstones, GERD, HLD, and prostate CA on chemo, and CAD, BIBEMS from Urgent Care with left sided chest painm, onset 8 hours ago. Pain is non radiating, worse with deep breath, and has been constant since onset. Associated nausea. Denies associated LE edema, SOB, vomiting,, cough, abd pain. Pt states he was recently dx'd with GERD and given meds but ran out two days ago. Admits to occasional crack cocaine use with last two days ago.   [facility] Pt is a 79 y/o M, with PMHx of AAA, colon obsutruction, Gallstones, GERD, HLD, and prostate CA on chemo, and CAD, BIBEMS from Urgent Care with left sided chest pain, onset 8 hours ago. Pain is non radiating, worse with deep breath, and has been constant since onset. Associated nausea. Denies associated LE edema, SOB, vomiting,, cough, abd pain. Pt states he was recently dx'd with GERD and given meds but ran out two days ago. Admits to occasional crack cocaine use with last two days ago.   [facility] [place]

## 2018-11-14 NOTE — H&P ADULT - HISTORY OF PRESENT ILLNESS
78 y.o. male with PMH known aortic aneurysm, carotid stenosis, colonic obstruction s/p hemicolectomy, neurogenic bladder, prostate cancer presents with chest pain woke him up. Pt reports 7/10 substernal with radiation up the esophagus that is burning sensation. Denies other radiation. Currently, reports improvement with chest pain. Denies fever, chills, SOB, abd pain. Pt reports having b/l nephrostomy tubes and next change in 2 weeks. Reports on chemotherapy at the [facility] and doesn't remember the doctor's name. Pt reports that [facility] "doesn't tell me anything". No n/v. Pt reports taking crack cocaine 2 days ago and "a lot" in quantity.    PMH: as above  PSH: as above and cholecystectomy, right cataract, THR, nephrostomy tube  Social Hx: former smoker, quit 40 yr ago, snort crack cocaine  Family Hx: Father-stroke; Mother-stroke,  age 80s  ROS: per HPI 78 y.o. male with PMH known aortic aneurysm, carotid stenosis, colonic obstruction s/p hemicolectomy, neurogenic bladder, prostate cancer presents with chest pain woke him up. Pt reports 7/10 substernal with radiation up the esophagus that is burning sensation. Denies other radiation. Currently, reports improvement with chest pain. Denies fever, chills, SOB, abd pain. Pt reports having b/l nephrostomy tubes and next change in 2 weeks. Reports on chemotherapy at the [facility] and doesn't remember the doctor's name. Pt reports that [facility] "doesn't tell me anything". No n/v. Pt reports taking crack cocaine 2 days ago and "a lot" in quantity. Pt doesn't remember all the names/dose of his medications.    PMH: as above  PSH: as above and cholecystectomy, right cataract, THR, nephrostomy tube  Social Hx: former smoker, quit 40 yr ago, snort crack cocaine  Family Hx: Father-stroke; Mother-stroke,  age 80s  ROS: per HPI

## 2018-11-14 NOTE — ED PROVIDER NOTE - CARE PLAN
Principal Discharge DX:	Chest pain with high risk of acute coronary syndrome  Secondary Diagnosis:	Carcinomatosis peritonei  Secondary Diagnosis:	Prostate CA

## 2018-11-14 NOTE — ED PROVIDER NOTE - CONSTITUTIONAL, MLM
normal... ill appearing, cachetic, thin, white elderly male, awake, alert, oriented to person, place, time/situation

## 2018-11-14 NOTE — ED ADULT NURSE NOTE - NSIMPLEMENTINTERV_GEN_ALL_ED
Implemented All Fall Risk Interventions:  [place] to call system. Call bell, personal items and telephone within reach. Instruct patient to call for assistance. Room bathroom lighting operational. Non-slip footwear when patient is off stretcher. Physically safe environment: no spills, clutter or unnecessary equipment. Stretcher in lowest position, wheels locked, appropriate side rails in place. Provide visual cue, wrist band, yellow gown, etc. Monitor gait and stability. Monitor for mental status changes and reorient to person, place, and time. Review medications for side effects contributing to fall risk. Reinforce activity limits and safety measures with patient and family.

## 2018-11-15 LAB
AMPHET UR-MCNC: NEGATIVE — SIGNIFICANT CHANGE UP
ANION GAP SERPL CALC-SCNC: 7 MMOL/L — SIGNIFICANT CHANGE UP (ref 5–17)
APPEARANCE UR: ABNORMAL
BACTERIA # UR AUTO: ABNORMAL
BARBITURATES UR SCN-MCNC: NEGATIVE — SIGNIFICANT CHANGE UP
BASOPHILS # BLD AUTO: 0.02 K/UL — SIGNIFICANT CHANGE UP (ref 0–0.2)
BASOPHILS NFR BLD AUTO: 0.5 % — SIGNIFICANT CHANGE UP (ref 0–2)
BENZODIAZ UR-MCNC: NEGATIVE — SIGNIFICANT CHANGE UP
BILIRUB UR-MCNC: NEGATIVE — SIGNIFICANT CHANGE UP
BUN SERPL-MCNC: 21 MG/DL — SIGNIFICANT CHANGE UP (ref 7–23)
CALCIUM SERPL-MCNC: 7.9 MG/DL — LOW (ref 8.5–10.1)
CHLORIDE SERPL-SCNC: 110 MMOL/L — HIGH (ref 96–108)
CHOLEST SERPL-MCNC: 99 MG/DL — SIGNIFICANT CHANGE UP (ref 10–199)
CO2 SERPL-SCNC: 28 MMOL/L — SIGNIFICANT CHANGE UP (ref 22–31)
COCAINE METAB.OTHER UR-MCNC: POSITIVE — SIGNIFICANT CHANGE UP
COLOR SPEC: YELLOW — SIGNIFICANT CHANGE UP
CREAT SERPL-MCNC: 0.98 MG/DL — SIGNIFICANT CHANGE UP (ref 0.5–1.3)
DIFF PNL FLD: ABNORMAL
EOSINOPHIL # BLD AUTO: 0.05 K/UL — SIGNIFICANT CHANGE UP (ref 0–0.5)
EOSINOPHIL NFR BLD AUTO: 1.3 % — SIGNIFICANT CHANGE UP (ref 0–6)
EPI CELLS # UR: SIGNIFICANT CHANGE UP
GLUCOSE BLDC GLUCOMTR-MCNC: 101 MG/DL — HIGH (ref 70–99)
GLUCOSE SERPL-MCNC: 80 MG/DL — SIGNIFICANT CHANGE UP (ref 70–99)
GLUCOSE UR QL: NEGATIVE MG/DL — SIGNIFICANT CHANGE UP
HCT VFR BLD CALC: 29.5 % — LOW (ref 39–50)
HDLC SERPL-MCNC: 44 MG/DL — SIGNIFICANT CHANGE UP
HGB BLD-MCNC: 8.9 G/DL — LOW (ref 13–17)
IMM GRANULOCYTES NFR BLD AUTO: 0.8 % — SIGNIFICANT CHANGE UP (ref 0–1.5)
KETONES UR-MCNC: NEGATIVE — SIGNIFICANT CHANGE UP
LEUKOCYTE ESTERASE UR-ACNC: ABNORMAL
LIPID PNL WITH DIRECT LDL SERPL: 36 MG/DL — SIGNIFICANT CHANGE UP
LYMPHOCYTES # BLD AUTO: 0.71 K/UL — LOW (ref 1–3.3)
LYMPHOCYTES # BLD AUTO: 18.3 % — SIGNIFICANT CHANGE UP (ref 13–44)
MCHC RBC-ENTMCNC: 27.1 PG — SIGNIFICANT CHANGE UP (ref 27–34)
MCHC RBC-ENTMCNC: 30.2 GM/DL — LOW (ref 32–36)
MCV RBC AUTO: 89.7 FL — SIGNIFICANT CHANGE UP (ref 80–100)
METHADONE UR-MCNC: NEGATIVE — SIGNIFICANT CHANGE UP
MONOCYTES # BLD AUTO: 0.52 K/UL — SIGNIFICANT CHANGE UP (ref 0–0.9)
MONOCYTES NFR BLD AUTO: 13.4 % — SIGNIFICANT CHANGE UP (ref 2–14)
NEUTROPHILS # BLD AUTO: 2.54 K/UL — SIGNIFICANT CHANGE UP (ref 1.8–7.4)
NEUTROPHILS NFR BLD AUTO: 65.7 % — SIGNIFICANT CHANGE UP (ref 43–77)
NITRITE UR-MCNC: NEGATIVE — SIGNIFICANT CHANGE UP
NRBC # BLD: 0 /100 WBCS — SIGNIFICANT CHANGE UP (ref 0–0)
OPIATES UR-MCNC: NEGATIVE — SIGNIFICANT CHANGE UP
PCP SPEC-MCNC: SIGNIFICANT CHANGE UP
PCP UR-MCNC: NEGATIVE — SIGNIFICANT CHANGE UP
PH UR: 6 — SIGNIFICANT CHANGE UP (ref 5–8)
PLATELET # BLD AUTO: 142 K/UL — LOW (ref 150–400)
POTASSIUM SERPL-MCNC: 3.7 MMOL/L — SIGNIFICANT CHANGE UP (ref 3.5–5.3)
POTASSIUM SERPL-SCNC: 3.7 MMOL/L — SIGNIFICANT CHANGE UP (ref 3.5–5.3)
PROT UR-MCNC: 100 MG/DL
RBC # BLD: 3.29 M/UL — LOW (ref 4.2–5.8)
RBC # FLD: 17.2 % — HIGH (ref 10.3–14.5)
RBC CASTS # UR COMP ASSIST: ABNORMAL /HPF (ref 0–4)
SODIUM SERPL-SCNC: 145 MMOL/L — SIGNIFICANT CHANGE UP (ref 135–145)
SP GR SPEC: 1 — LOW (ref 1.01–1.02)
THC UR QL: NEGATIVE — SIGNIFICANT CHANGE UP
TOTAL CHOLESTEROL/HDL RATIO MEASUREMENT: 2.3 RATIO — LOW (ref 3.4–9.6)
TRIGL SERPL-MCNC: 93 MG/DL — SIGNIFICANT CHANGE UP (ref 10–149)
TROPONIN I SERPL-MCNC: <0.015 NG/ML — SIGNIFICANT CHANGE UP (ref 0.01–0.04)
UROBILINOGEN FLD QL: NEGATIVE MG/DL — SIGNIFICANT CHANGE UP
WBC # BLD: 3.87 K/UL — SIGNIFICANT CHANGE UP (ref 3.8–10.5)
WBC # FLD AUTO: 3.87 K/UL — SIGNIFICANT CHANGE UP (ref 3.8–10.5)
WBC UR QL: >50

## 2018-11-15 PROCEDURE — 93010 ELECTROCARDIOGRAM REPORT: CPT | Mod: 76

## 2018-11-15 PROCEDURE — 99223 1ST HOSP IP/OBS HIGH 75: CPT

## 2018-11-15 RX ORDER — HYDROMORPHONE HYDROCHLORIDE 2 MG/ML
1 INJECTION INTRAMUSCULAR; INTRAVENOUS; SUBCUTANEOUS
Qty: 0 | Refills: 0 | Status: DISCONTINUED | OUTPATIENT
Start: 2018-11-15 | End: 2018-11-16

## 2018-11-15 RX ORDER — ACETAMINOPHEN 500 MG
1000 TABLET ORAL ONCE
Qty: 0 | Refills: 0 | Status: COMPLETED | OUTPATIENT
Start: 2018-11-15 | End: 2018-11-15

## 2018-11-15 RX ORDER — ONDANSETRON 8 MG/1
4 TABLET, FILM COATED ORAL EVERY 6 HOURS
Qty: 0 | Refills: 0 | Status: DISCONTINUED | OUTPATIENT
Start: 2018-11-15 | End: 2018-11-18

## 2018-11-15 RX ORDER — HYDROMORPHONE HYDROCHLORIDE 2 MG/ML
2 INJECTION INTRAMUSCULAR; INTRAVENOUS; SUBCUTANEOUS ONCE
Qty: 0 | Refills: 0 | Status: DISCONTINUED | OUTPATIENT
Start: 2018-11-15 | End: 2018-11-15

## 2018-11-15 RX ADMIN — PANTOPRAZOLE SODIUM 40 MILLIGRAM(S): 20 TABLET, DELAYED RELEASE ORAL at 05:52

## 2018-11-15 RX ADMIN — Medication 650 MILLIGRAM(S): at 11:41

## 2018-11-15 RX ADMIN — HEPARIN SODIUM 5000 UNIT(S): 5000 INJECTION INTRAVENOUS; SUBCUTANEOUS at 05:52

## 2018-11-15 RX ADMIN — HYDROMORPHONE HYDROCHLORIDE 1 MILLIGRAM(S): 2 INJECTION INTRAMUSCULAR; INTRAVENOUS; SUBCUTANEOUS at 18:43

## 2018-11-15 RX ADMIN — HYDROMORPHONE HYDROCHLORIDE 2 MILLIGRAM(S): 2 INJECTION INTRAMUSCULAR; INTRAVENOUS; SUBCUTANEOUS at 02:51

## 2018-11-15 RX ADMIN — Medication 81 MILLIGRAM(S): at 11:11

## 2018-11-15 RX ADMIN — Medication 400 MILLIGRAM(S): at 02:55

## 2018-11-15 RX ADMIN — Medication 650 MILLIGRAM(S): at 11:11

## 2018-11-15 RX ADMIN — PANTOPRAZOLE SODIUM 40 MILLIGRAM(S): 20 TABLET, DELAYED RELEASE ORAL at 18:36

## 2018-11-15 RX ADMIN — Medication 30 MILLILITER(S): at 18:37

## 2018-11-15 NOTE — PROGRESS NOTE ADULT - ASSESSMENT
#chest pain with recent cocaine use  #abnormal EKG  -chest pain likely from GERD/acid reflux  -cont IV PPI BID  -ASA, statin  -lipid panel  -EKG noted  -cardio consult, Dr Girard    #hypotension  -SBP 80s-90s, pt alert, oriented, awake- better now- 90s-100s  -pt reports SBP ~105 baseline  -will continue to IV fluid pt and monitor BP    #aortic aneurysm  -per CT report, stable    #prostate cancer on chemo  -pt reports getting chemo at [facility]. He reports that they didn't tell him anything  -CT noted with peritoneal carcinomatosis and new hepatic lobe lesion  -oncology consult, Dr Youssef    #advanced care planning  -discussed with patient regarding advanced directives/code status. Pt reports "I've seen people get chest compressions. I want to be DNR". Pt reports he doesn't want to be intubated either.  -pt is DNR/DNI    DVT ppx  -Heparin SC

## 2018-11-15 NOTE — CONSULT NOTE ADULT - SUBJECTIVE AND OBJECTIVE BOX
Patient is a 78y old  Male who presents with a chief complaint of CP.     HPI:  78 y.o. male with PMH known aortic aneurysm, carotid stenosis, colonic obstruction s/p hemicolectomy, neurogenic bladder, prostate cancer presents with chest pain woke him up. Pt reports 7/10 substernal with radiation up the esophagus that is burning sensation. Denies other radiation. Currently, reports improvement with chest pain. Denies fever, chills, SOB, abd pain. Pt reports having b/l nephrostomy tubes and next change in 2 weeks. Reports on chemotherapy at the [facility] and doesn't remember the doctor's name. Pt reports that [facility] "doesn't tell me anything". No n/v. Pt reports taking crack cocaine 2 days ago and "a lot" in quantity.     Pt denies any CP or SOB at this time.  He is a poor historian but admits to using cocaine.       PSH: as above and cholecystectomy, right cataract, THR, nephrostomy tube  Social Hx: former smoker, quit 40 yr ago, snort crack cocaine  Family Hx: Father-stroke; Mother-stroke,  age 80s      PAST MEDICAL & SURGICAL HISTORY:  CAD (coronary artery disease)  HLD (hyperlipidemia)  GERD (gastroesophageal reflux disease)  Colon obstruction  Prostate CA  Gallstones  Neurogenic bladder  Carotid stenosis: right and left  AAA (abdominal aortic aneurysm):   Left rib fracture  S/P cataract surgery, right  H/O hemicolectomy  History of intestinal surgery  History of cholecystectomy  THR (Total Hip Replacement)      MEDICATIONS  (STANDING):  aspirin  chewable 81 milliGRAM(s) Oral daily  heparin  Injectable 5000 Unit(s) SubCutaneous every 12 hours  influenza   Vaccine 0.5 milliLiter(s) IntraMuscular once  pantoprazole  Injectable 40 milliGRAM(s) IV Push two times a day  sodium chloride 0.9%. 1000 milliLiter(s) (100 mL/Hr) IV Continuous <Continuous>    MEDICATIONS  (PRN):  acetaminophen   Tablet .. 650 milliGRAM(s) Oral every 6 hours PRN Temp greater or equal to 38C (100.4F), Mild Pain (1 - 3)  HYDROmorphone  Injectable 1 milliGRAM(s) IV Push every 3 hours PRN Severe Pain (7 - 10)  ondansetron Injectable 4 milliGRAM(s) IV Push every 6 hours PRN Nausea and/or Vomiting  oxybutynin 5 milliGRAM(s) Oral two times a day PRN Bladder spasms            REVIEW OF SYSTEMS:  CONSTITUTIONAL:    No fatigue, malaise, lethargy.  No fever or chills.  HEENT:  Eyes:  No visual changes.     ENT:  No epistaxis.  No sinus pain.    RESPIRATORY:  No cough.  No wheeze.  No hemoptysis.  No shortness of breath.  CARDIOVASCULAR:  c/o chest pains.  No palpitations. No shortness of breath, No orthopnea or PND.  GASTROINTESTINAL:  No abdominal pain.  No nausea or vomiting.    GENITOURINARY:    No hematuria.    MUSCULOSKELETAL:  No musculoskeletal pain.  No joint swelling.  No arthritis.  NEUROLOGICAL:  No tingling or numbness or weakness.  PSYCHIATRIC:  No confusion  SKIN:  No rashes.    ENDOCRINE:  No unexplained weight loss.  No polydipsia.   HEMATOLOGIC:  No anemia.  No prolonged or excessive bleeding.   ALLERGIC AND IMMUNOLOGIC:  No pruritus.          Vital Signs Last 24 Hrs  T(C): 36.6 (15 Nov 2018 06:28), Max: 36.8 (2018 16:44)  T(F): 97.8 (15 Nov 2018 06:28), Max: 98.3 (2018 16:44)  HR: 60 (15 Nov 2018 06:28) (59 - 115)  BP: 90/62 (15 Nov 2018 06:28) (81/46 - 130/93)  BP(mean): --  RR: 19 (15 Nov 2018 06:28) (16 - 24)  SpO2: 100% (15 Nov 2018 06:28) (90% - 100%)    PHYSICAL EXAM-    Constitutional: cachectic elderly male in no acute distress.     Head: Head is normocephalic and atraumatic.      Neck: No JVD.     Cardiovascular: Regular rate and rhythm without S3, S4. No murmurs or rubs are appreciated.      Respiratory: Decreased breath sounds on left side.     Abdomen: Soft, nontender, nondistended with positive bowel sounds.      Extremity: No tenderness. No  pitting edema     Neurologic: The patient is alert and oriented.      Skin: No rash, no obvious lesions noted.      Psychiatric: The patient appears to be emotionally stable.      INTERPRETATION OF TELEMETRY:  sinus rythm, sinus bradycardia.     ECG: Sinus rythm , L axis , RBBB.      I&O's Detail    2018 07:01  -  15 Nov 2018 07:00  --------------------------------------------------------  IN:  Total IN: 0 mL    OUT:    Nephrostomy Tube: 250 mL    Nephrostomy Tube: 175 mL  Total OUT: 425 mL    Total NET: -425 mL          LABS:                        8.9    3.87  )-----------( 142      ( 15 Nov 2018 06:12 )             29.5     11-15    145  |  110<H>  |  21  ----------------------------<  80  3.7   |  28  |  0.98    Ca    7.9<L>      15 Nov 2018 06:12    TPro  6.7  /  Alb  2.8<L>  /  TBili  0.4  /  DBili  x   /  AST  25  /  ALT  15  /  AlkPhos  262<H>  11    CARDIAC MARKERS ( 15 Nov 2018 02:04 )  <0.015 ng/mL / x     / x     / x     / x      CARDIAC MARKERS ( 2018 19:48 )  <0.015 ng/mL / x     / x     / x     / x      CARDIAC MARKERS ( 2018 16:04 )  <0.015 ng/mL / x     / x     / x     / x      CARDIAC MARKERS ( 2018 11:55 )  <0.015 ng/mL / x     / 192 U/L / x     / x            Urinalysis Basic - ( 15 Nov 2018 03:15 )    Color: Yellow / Appearance: Slightly Turbid / S.005 / pH: x  Gluc: x / Ketone: Negative  / Bili: Negative / Urobili: Negative mg/dL   Blood: x / Protein: 100 mg/dL / Nitrite: Negative   Leuk Esterase: Moderate / RBC: 3-5 /HPF / WBC >50   Sq Epi: x / Non Sq Epi: Occasional / Bacteria: TNTC      I&O's Summary    2018 07:01  -  15 Nov 2018 07:00  --------------------------------------------------------  IN: 0 mL / OUT: 425 mL / NET: -425 mL      BNP  RADIOLOGY & ADDITIONAL STUDIES:  < from: CT Angio Chest Dissection Protocol (11.14.18 @ 13:33) >    EXAM:  CT ANGIO ABD PELVIS (W)AW IC                          EXAM:  CTA CHEST DISSECTION (W)AW IC                            PROCEDURE DATE:  2018          INTERPRETATION:  Clinical information: Chest pain. Episode of   hypotension. Rule out pulmonary embolism or aortic dissection.    COMPARISON: CT abdomen/pelvis 2018. CTA chest 2017    PROCEDURE:   CT of the Chest, Abdomen and Pelvis was performed with and without   intravenous contrast.   Precontrast imaging was performed through the chest followed by arterial   phase imaging of the chest, abdomen and pelvis in the arterial phase.  Intravenous contrast: 90 ml Omnipaque 350. 10 ml discarded.  Oral contrast:None.  Sagittal and coronal reformats were performed and MIP images were   obtained.    FINDINGS:    CHEST:     LOWER NECK: Some sternal extension of the left thyroid lobe into the   superior mediastinum.  AXILLA, MEDIASTINUM AND STEWART: No lymphadenopathy.  VESSELS: Aneurysmal dilatation of the ascending aorta which measures 4.6   cm the level of the main pulmonary artery. No dissection, penetrating   ulcer or pseudoaneurysm. Coronary artery calcifications.  HEART: The heart is normal in size.No pericardial effusion.  PLEURA: No pleural effusion.  LUNGS AND LARGE AIRWAYS: Patent central airways. 6 mm right lower lobe   pulmonary nodule (2; 36), unchanged. No new pulmonary nodules.  CHEST WALL:  Unremarkable    ABDOMEN AND PELVIS:    LIVER: 3 cm irregularly-shaped solid mass in the posterior right hepatic   lobe with an exophytic component. Right hepatic lobe cyst.  SPLEEN: Within normal limits.  PANCREAS: Within normal limits.  GALLBLADDER: Cholecystectomy.  BILE DUCTS: Normal caliber.  ADRENALS: Right adrenal calcification compatible with old hemorrhage or   infection.  KIDNEYS/URETERS: No mass, stone or hydronephrosis. Bilateral percutaneous   nephrostomy tubes in place. 8 mm stone in the right renal pelvis.    RETROPERITONEUM: Widespread bulky retroperitoneal lymphadenopathy, as   numerous lymph nodes along the course of the inferior mesenteric artery   extending to the sigmoid colon are markedly worsened..  VESSELS:  Severe diffuse atherosclerotic arterial calcification. Normal   caliber aorta. The celiac and superior mesenteric arteries are patent.    BOWEL: No bowel obstruction, wall thickening or inflammatory change.   Appendix normal. Large amount of stool throughout the colon.  PERITONEUM: No ascites or pneumoperitoneum. Large sheetlike mass at the   root of the small bowelmesentery compatible with peritoneal tumor.  Hepatic metastasis.  REPRODUCTIVE ORGANS: Moderately enlarged prostate.  BLADDER: Collapsed around a Garcia catheter balloon.    ABDOMINAL WALL: Within normal limits.  BONES: No acute abnormality. Left hip arthroplasty.  Stable moderate T12   compression deformity. Multiple old left rib fractures.    IMPRESSION:    Chest: No aortic dissection or pulmonary bullous and.  Aneurysmal ascending aorta measuring 4.6 cm, unchanged.    Abdomen/pelvis: Bulky retroperitoneal lymphadenopathy as described,   significantly worsened.  Extensive peritoneal carcinomatosis involving the small bowel mesentery.   New partially exophytic right hepatic lobe lesion may also represent a   peritoneal implant, versus pelvic metastasis.                SAUL DUMONT   This document has been electronically signed. 2018  2:25PM                < end of copied text >
78 y.o. male with PMH known aortic aneurysm, carotid stenosis, colonic obstruction s/p hemicolectomy, neurogenic bladder, prostate cancer presents with chest pain woke him up. Patient has history of prostate cancer currently been treated at [facility]. The patient does not know the name of his therapy or extent of the disease.     PAST MEDICAL & SURGICAL HISTORY:  CAD (coronary artery disease)  HLD (hyperlipidemia)  GERD (gastroesophageal reflux disease)  Colon obstruction  Prostate CA  Gallstones  Neurogenic bladder  Carotid stenosis: right and left  AAA (abdominal aortic aneurysm): 2010  Left rib fracture  S/P cataract surgery, right  H/O hemicolectomy  History of intestinal surgery  History of cholecystectomy  THR (Total Hip Replacement)      FAMILY HISTORY:  No pertinent family history in first degree relatives  Reviewed of systems: All systems reviewed negative.       ICU Vital Signs Last 24 Hrs  T(C): 36.7 (15 Nov 2018 11:52), Max: 36.8 (14 Nov 2018 16:44)  T(F): 98 (15 Nov 2018 11:52), Max: 98.3 (14 Nov 2018 16:44)  HR: 69 (15 Nov 2018 11:52) (59 - 95)  BP: 100/58 (15 Nov 2018 11:52) (87/66 - 117/71)  BP(mean): --  ABP: --  ABP(mean): --  RR: 18 (15 Nov 2018 11:52) (16 - 24)  SpO2: 100% (15 Nov 2018 11:52) (94% - 100%)      general gentleman in NAD, sitting up eating breakfast.  HEENT  Lungs bilateral decreased breath sounds.  CVS S1 S2 regular, no M/R/G  Abdomen soft NT ND positive for BS.   Extremities: No C/C/E.    MEDICATIONS  (STANDING):  aspirin  chewable 81 milliGRAM(s) Oral daily  heparin  Injectable 5000 Unit(s) SubCutaneous every 12 hours  influenza   Vaccine 0.5 milliLiter(s) IntraMuscular once  pantoprazole  Injectable 40 milliGRAM(s) IV Push two times a day  sodium chloride 0.9%. 1000 milliLiter(s) (100 mL/Hr) IV Continuous <Continuous>    MEDICATIONS  (PRN):  acetaminophen   Tablet .. 650 milliGRAM(s) Oral every 6 hours PRN Temp greater or equal to 38C (100.4F), Mild Pain (1 - 3)  aluminum hydroxide/magnesium hydroxide/simethicone Suspension 30 milliLiter(s) Oral every 4 hours PRN Dyspepsia  HYDROmorphone  Injectable 1 milliGRAM(s) IV Push every 3 hours PRN Severe Pain (7 - 10)  ondansetron Injectable 4 milliGRAM(s) IV Push every 6 hours PRN Nausea and/or Vomiting  oxybutynin 5 milliGRAM(s) Oral two times a day PRN Bladder spasms      Lipid Profile in AM (11.15.18 @ 06:12)    Total Cholesterol/HDL Ratio Measurement: 2.3 RATIO    Cholesterol, Serum: 99 mg/dL    Triglycerides, Serum: 93 mg/dL    HDL Cholesterol, Serum: 44: HDL Levels >/= 60 mg/dL are considered beneficial and a "negative" risk  factor.  Effective 08/15/2018: New reference range and interpretive comment. mg/dL  Basic Metabolic Panel (11.15.18 @ 06:12)    Sodium, Serum: 145 mmol/L    Potassium, Serum: 3.7 mmol/L    Chloride, Serum: 110 mmol/L    Carbon Dioxide, Serum: 28 mmol/L    Anion Gap, Serum: 7 mmol/L    Blood Urea Nitrogen, Serum: 21 mg/dL    Creatinine, Serum: 0.98 mg/dL    Glucose, Serum: 80 mg/dL    Calcium, Total Serum: 7.9 mg/dL    eGFR if Non : 74: Interpretative comment  The units for eGFR are ml/min/1.73m2 (normalized body surface area). The  eGFR is calculated from a serum creatinine using the CKD-EPI equation.  Other variables required for calculation are race, age and sex. Among  patients with chronic kidney disease (CKD), the eGFR is useful in  determining the stage of disease according to KDOQI CKD classification.  All eGFR results are reported numerically with the following  interpretation.          GFR                    With                 Without     (ml/min/1.73 m2)    Kidney Damage       Kidney Damage        >= 90                    Stage 1                     Normal        60-89                    Stage 2                     Decreased GFR        30-59     Stage 3                     Stage 3        15-29                    Stage 4                     Stage 4        < 15                      Stage 5                     Stage 5  Each stage of CKD assumes that the associated GFR level has been in  effect for at least 3 months. Determination of stages one and two (with  eGFR > 59 ml/min/m2) requires estimation of kidney damage for at least 3  months as defined by structural or functional abnormalities.  Limitations: All estimates of GFR will be less accurate for patients at  extremes of muscle mass (including but not limited to frail elderly,  critically ill, or cancer patients), those with unusual diets, and those  with conditions associated with reduced secretion or extrarenal  elimination of creatinine. The eGFR equation is not recommended for use  in patients with unstable creatinine levels. mL/min/1.73M2    eGFR if African American: 86 mL/min/1.73M2      Direct LDL: 36: LDL Cholesterol --- Interpretive Comment (for adults 18 and over)  Optimal LDL Level may vary based on clinical situation  Below 70                  Ideal for people at very high risk of heart  disease  Below 100                Ideal for people at risk of heart disease  100 - 129                   Near [place]  130 - 159                   Borderline high  160 - 189                   High  190 and Above          Very high mg/dL        Complete Blood Count + Automated Diff (11.15.18 @ 06:12)    WBC Count: 3.87 K/uL    RBC Count: 3.29 M/uL    Hemoglobin: 8.9 g/dL    Hematocrit: 29.5 %    Mean Cell Volume: 89.7 fl    Mean Cell Hemoglobin: 27.1 pg    Mean Cell Hemoglobin Conc: 30.2 gm/dL    Red Cell Distrib Width: 17.2 %    Platelet Count - Automated: 142 K/uL    Auto Neutrophil #: 2.54 K/uL    Auto Lymphocyte #: 0.71 K/uL    Auto Monocyte #: 0.52 K/uL    Auto Eosinophil #: 0.05 K/uL    Auto Basophil #: 0.02 K/uL    Auto Neutrophil %: 65.7: Differential percentages must be correlated with absolute numbers for  clinical significance. %    Auto Lymphocyte %: 18.3 %    Auto Monocyte %: 13.4 %    Auto Eosinophil %: 1.3 %    Auto Basophil %: 0.5 %    Auto Immature Granulocyte %: 0.8 %

## 2018-11-15 NOTE — CHART NOTE - NSCHARTNOTEFT_GEN_A_CORE
Rapid response called due to chest pain. 78 y.o. male with PMH known aortic aneurysm, colonic obstruction s/p hemicolectomy, prostate cancer admitted for chest pain . Pt reports taking a significant amount  of crack cocaine 2 days prior to admission. Patient evaluated at bedside screaming at the top of his lungs due to pain, thus unable to gather history regarding this chest pain. 1 mg Dilaudid given and immediately the chest pain subsided.     Vital Signs (24 Hrs):  T(C): 36.7 (11-14-18 @ 21:30), Max: 36.8 (11-14-18 @ 16:44)  HR: 67 (11-14-18 @ 21:30) (67 - 115)  BP: 99/59 (11-14-18 @ 21:30) (81/46 - 130/93)  RR: 18 (11-14-18 @ 21:30) (16 - 20)  SpO2: 99% (11-14-18 @ 21:30) (90% - 100%)    PE:  General: distress due to the pain, screaming, yelling  Heart: S1, S2+  Lungs: CTA b/l    A/P:  Chest pain  - EKG- sinus bradycardia, RBBB, T wave abnormality in V2-V6  - troponin    D/w Dr. Kayserian Rapid response called due to chest pain. 78 y.o. male with PMHx known aortic aneurysm, colonic obstruction s/p hemicolectomy, prostate cancer admitted for chest pain . Pt reports taking a significant amount of crack cocaine 2 days prior to admission. Patient evaluated at bedside screaming at the top of his lungs due to pain, thus unable to gather history regarding this chest pain. 1 mg Dilaudid given and immediately the chest pain subsided.     Vital Signs (24 Hrs):  T(C): 36.7 (11-14-18 @ 21:30), Max: 36.8 (11-14-18 @ 16:44)  HR: 67 (11-14-18 @ 21:30) (67 - 115)  BP: 99/59 (11-14-18 @ 21:30) (81/46 - 130/93)  RR: 18 (11-14-18 @ 21:30) (16 - 20)  SpO2: 99% (11-14-18 @ 21:30) (90% - 100%)    PE:  General: distress due to the pain, screaming, yelling  Heart: S1, S2+  Lungs: CTA b/l    A/P:  Chest pain  - EKG-> RBBB and T wave inversion in V2-V6 which are unchanged from previous EKG  - troponin  - continue aspirin  - monitor vitals    D/w Dr. Kayserian, Dr. Terry

## 2018-11-15 NOTE — PROGRESS NOTE ADULT - SUBJECTIVE AND OBJECTIVE BOX
INTERVAL HPI/OVERNIGHT EVENTS: 78 y.o. male with PMH known aortic aneurysm, carotid stenosis, colonic obstruction s/p hemicolectomy, neurogenic bladder, prostate cancer presents with chest pain woke him up. Pt reports 7/10 substernal with radiation up the esophagus that is burning sensation. Denies other radiation. Currently, reports improvement with chest pain. Denies fever, chills, SOB, abd pain. Pt reports having b/l nephrostomy tubes and next change in 2 weeks. Reports on chemotherapy at the [facility] and doesn't remember the doctor's name. Pt reports that [facility] "doesn't tell me anything". No n/v. Pt reports taking crack cocaine 2 days ago and "a lot" in quantity. Pt doesn't remember all the names/dose of his medications.    11/15/18- Patient seen and examined. Daughter present at bedside. Patient states currently he feels well. Overnight had RRT called for chest pain, patient states it was severe GERD and no one was paying attention to him so he got agitated. Patient received dilaudid 1mg IV x1 and pain resolved and patient went to sleep.    MEDICATIONS  (STANDING):  aspirin  chewable 81 milliGRAM(s) Oral daily  heparin  Injectable 5000 Unit(s) SubCutaneous every 12 hours  influenza   Vaccine 0.5 milliLiter(s) IntraMuscular once  pantoprazole  Injectable 40 milliGRAM(s) IV Push two times a day  sodium chloride 0.9%. 1000 milliLiter(s) (100 mL/Hr) IV Continuous <Continuous>    MEDICATIONS  (PRN):  acetaminophen   Tablet .. 650 milliGRAM(s) Oral every 6 hours PRN Temp greater or equal to 38C (100.4F), Mild Pain (1 - 3)  aluminum hydroxide/magnesium hydroxide/simethicone Suspension 30 milliLiter(s) Oral every 4 hours PRN Dyspepsia  HYDROmorphone  Injectable 1 milliGRAM(s) IV Push every 3 hours PRN Severe Pain (7 - 10)  ondansetron Injectable 4 milliGRAM(s) IV Push every 6 hours PRN Nausea and/or Vomiting  oxybutynin 5 milliGRAM(s) Oral two times a day PRN Bladder spasms      Allergies    morphine (Vomiting)  sulfa drugs (Other)    Intolerances      ROS:  CONSTITUTIONAL: No weakness, fevers or chills  EYES/ENT: No visual changes;  No vertigo or throat pain   NECK: No pain or stiffness  RESPIRATORY: No cough, wheezing, hemoptysis; No shortness of breath  CARDIOVASCULAR: No chest pain or palpitations  GASTROINTESTINAL: No abdominal or epigastric pain. No nausea, vomiting, or hematemesis; No diarrhea or constipation. No melena or hematochezia.  GENITOURINARY: No dysuria, frequency or hematuria  NEUROLOGICAL: No numbness or weakness  SKIN: No itching, burning, rashes, or lesions   All other review of systems is negative unless indicated above.    Vital Signs Last 24 Hrs  T(C): 36.7 (15 Nov 2018 11:52), Max: 36.7 (2018 21:30)  T(F): 98 (15 Nov 2018 11:52), Max: 98.1 (2018 21:30)  HR: 69 (15 Nov 2018 11:52) (59 - 95)  BP: 100/58 (15 Nov 2018 11:52) (87/66 - 117/71)  BP(mean): --  RR: 18 (15 Nov 2018 11:52) (16 - 24)  SpO2: 100% (15 Nov 2018 11:52) (94% - 100%)     @ 07:01  -  11-15 @ 07:00  --------------------------------------------------------  IN: 0 mL / OUT: 425 mL / NET: -425 mL      Physical Exam:  General: WN/WD NAD  Neurology: A&Ox3, nonfocal, PARSONS x 4  Respiratory: CTA B/L  CV: RRR, S1S2, no murmurs, rubs or gallops  Abdominal: Soft, NT, ND +BS  Extremities: No edema, + peripheral pulses      LABS:                        8.9    3.87  )-----------( 142      ( 15 Nov 2018 06:12 )             29.5     11-15    145  |  110<H>  |  21  ----------------------------<  80  3.7   |  28  |  0.98    Ca    7.9<L>      15 Nov 2018 06:12    TPro  6.7  /  Alb  2.8<L>  /  TBili  0.4  /  DBili  x   /  AST  25  /  ALT  15  /  AlkPhos  262<H>  11-14      Urinalysis Basic - ( 15 Nov 2018 03:15 )    Color: Yellow / Appearance: Slightly Turbid / S.005 / pH: x  Gluc: x / Ketone: Negative  / Bili: Negative / Urobili: Negative mg/dL   Blood: x / Protein: 100 mg/dL / Nitrite: Negative   Leuk Esterase: Moderate / RBC: 3-5 /HPF / WBC >50   Sq Epi: x / Non Sq Epi: Occasional / Bacteria: TNTC        RADIOLOGY & ADDITIONAL TESTS:

## 2018-11-15 NOTE — CONSULT NOTE ADULT - ASSESSMENT
Chest pain-   Chest pain is atypical in nature. So far cardiac enzymes and EKG did not reveal any ischemia.   Evaluate for other etiologies of chest pain including GI.  f/u with cardiologist as outpt.   Unclear prior ischemic workup.    Pt poor historian.    Thoracic aortic aneurysm- no prior imaging available and pt does not know of having this before.  Stable features per imaging.    Prostate cancer with lung nodule and liver mass- likely metastatic malignancy.  Recommend oncology evaluation and assessment of the prognosis.    Other medical issues- Management per primary team.   Thank you for allowing me to participate in the care of this patient. Please feel free to contact me with any questions.
78 y.o. male with PMH known aortic aneurysm, carotid stenosis, colonic obstruction s/p hemicolectomy, neurogenic bladder, prostate cancer presents with chest pain woke him up. Patient has history of prostate cancer currently been treated at [facility]. The patient does not know the name of his therapy or extent of the disease. [facility]     Currently patient is been worked up for chest pain. Patient can be evaluated in the outpatient by his oncologist.

## 2018-11-16 LAB
ANION GAP SERPL CALC-SCNC: 7 MMOL/L — SIGNIFICANT CHANGE UP (ref 5–17)
BUN SERPL-MCNC: 15 MG/DL — SIGNIFICANT CHANGE UP (ref 7–23)
CALCIUM SERPL-MCNC: 8.1 MG/DL — LOW (ref 8.5–10.1)
CHLORIDE SERPL-SCNC: 110 MMOL/L — HIGH (ref 96–108)
CO2 SERPL-SCNC: 28 MMOL/L — SIGNIFICANT CHANGE UP (ref 22–31)
CREAT SERPL-MCNC: 0.89 MG/DL — SIGNIFICANT CHANGE UP (ref 0.5–1.3)
GLUCOSE SERPL-MCNC: 93 MG/DL — SIGNIFICANT CHANGE UP (ref 70–99)
HCT VFR BLD CALC: 30.3 % — LOW (ref 39–50)
HGB BLD-MCNC: 9.2 G/DL — LOW (ref 13–17)
MCHC RBC-ENTMCNC: 26.6 PG — LOW (ref 27–34)
MCHC RBC-ENTMCNC: 30.4 GM/DL — LOW (ref 32–36)
MCV RBC AUTO: 87.6 FL — SIGNIFICANT CHANGE UP (ref 80–100)
NRBC # BLD: 0 /100 WBCS — SIGNIFICANT CHANGE UP (ref 0–0)
PLATELET # BLD AUTO: 137 K/UL — LOW (ref 150–400)
POTASSIUM SERPL-MCNC: 3.6 MMOL/L — SIGNIFICANT CHANGE UP (ref 3.5–5.3)
POTASSIUM SERPL-SCNC: 3.6 MMOL/L — SIGNIFICANT CHANGE UP (ref 3.5–5.3)
RBC # BLD: 3.46 M/UL — LOW (ref 4.2–5.8)
RBC # FLD: 17.2 % — HIGH (ref 10.3–14.5)
SODIUM SERPL-SCNC: 145 MMOL/L — SIGNIFICANT CHANGE UP (ref 135–145)
WBC # BLD: 4.58 K/UL — SIGNIFICANT CHANGE UP (ref 3.8–10.5)
WBC # FLD AUTO: 4.58 K/UL — SIGNIFICANT CHANGE UP (ref 3.8–10.5)

## 2018-11-16 PROCEDURE — 93010 ELECTROCARDIOGRAM REPORT: CPT

## 2018-11-16 RX ORDER — OXYCODONE AND ACETAMINOPHEN 5; 325 MG/1; MG/1
2 TABLET ORAL EVERY 4 HOURS
Qty: 0 | Refills: 0 | Status: DISCONTINUED | OUTPATIENT
Start: 2018-11-16 | End: 2018-11-18

## 2018-11-16 RX ORDER — PANTOPRAZOLE SODIUM 20 MG/1
40 TABLET, DELAYED RELEASE ORAL
Qty: 0 | Refills: 0 | Status: DISCONTINUED | OUTPATIENT
Start: 2018-11-16 | End: 2018-11-18

## 2018-11-16 RX ADMIN — HYDROMORPHONE HYDROCHLORIDE 1 MILLIGRAM(S): 2 INJECTION INTRAMUSCULAR; INTRAVENOUS; SUBCUTANEOUS at 11:43

## 2018-11-16 RX ADMIN — HYDROMORPHONE HYDROCHLORIDE 1 MILLIGRAM(S): 2 INJECTION INTRAMUSCULAR; INTRAVENOUS; SUBCUTANEOUS at 04:34

## 2018-11-16 RX ADMIN — PANTOPRAZOLE SODIUM 40 MILLIGRAM(S): 20 TABLET, DELAYED RELEASE ORAL at 06:25

## 2018-11-16 RX ADMIN — Medication 650 MILLIGRAM(S): at 16:23

## 2018-11-16 RX ADMIN — HEPARIN SODIUM 5000 UNIT(S): 5000 INJECTION INTRAVENOUS; SUBCUTANEOUS at 06:24

## 2018-11-16 RX ADMIN — HYDROMORPHONE HYDROCHLORIDE 1 MILLIGRAM(S): 2 INJECTION INTRAMUSCULAR; INTRAVENOUS; SUBCUTANEOUS at 08:31

## 2018-11-16 RX ADMIN — HYDROMORPHONE HYDROCHLORIDE 1 MILLIGRAM(S): 2 INJECTION INTRAMUSCULAR; INTRAVENOUS; SUBCUTANEOUS at 14:21

## 2018-11-16 RX ADMIN — OXYCODONE AND ACETAMINOPHEN 2 TABLET(S): 5; 325 TABLET ORAL at 21:45

## 2018-11-16 RX ADMIN — HYDROMORPHONE HYDROCHLORIDE 1 MILLIGRAM(S): 2 INJECTION INTRAMUSCULAR; INTRAVENOUS; SUBCUTANEOUS at 00:09

## 2018-11-16 RX ADMIN — Medication 81 MILLIGRAM(S): at 11:46

## 2018-11-16 NOTE — PHYSICAL THERAPY INITIAL EVALUATION ADULT - PERTINENT HX OF CURRENT PROBLEM, REHAB EVAL
Pt reports 7/10 substernal with radiation up the esophagus that is burning sensation. Denies other radiation. Currently, reports improvement with chest pain. Denies fever, chills, SOB, abd pain. Pt reports having b/l nephrostomy tubes and next change in 2 weeks. Reports on chemotherapy at the [facility] and doesn't remember the doctor's name. Pt reports that [facility] "doesn't tell me anything". No n/v. Pt reports taking crack cocaine 2 days ago and "a lot" in quantity.

## 2018-11-16 NOTE — DIETITIAN INITIAL EVALUATION ADULT. - NS AS NUTRI DX NUTRIENT
Malnutrition/Meets criteria for severe protein-calorie malnutrition in the context of chronic illness

## 2018-11-16 NOTE — PROGRESS NOTE ADULT - ASSESSMENT
#chest pain with recent cocaine use  #abnormal EKG  -chest pain likely from GERD/acid reflux  -cont PPI  -ASA, statin  -lipid panel  -EKG noted  -cardio consult, Dr Girard    #Delirium/agitation  -Possible hospital acquired  -Psych consulted- no capacity at this time  -Tried to reach daughter/significant other from numbers in EMR, unsuccessful at this time    #hypotension  -SBP 80s-90s, pt alert, oriented, awake- better now- 90s-100s  -pt reports SBP ~105 baseline  -will monitor BP    #aortic aneurysm  -per CT report, stable    #prostate cancer on chemo  -pt reports getting chemo at [facility]. He reports that they didn't tell him anything  -CT noted with peritoneal carcinomatosis and new hepatic lobe lesion  -oncology consult, Dr Youssef    #advanced care planning  -discussed with patient regarding advanced directives/code status. Pt reports "I've seen people get chest compressions. I want to be DNR". Pt reports he doesn't want to be intubated either.  -pt is DNR/DNI    DVT ppx  -Heparin SC

## 2018-11-16 NOTE — PHYSICAL THERAPY INITIAL EVALUATION ADULT - GENERAL OBSERVATIONS, REHAB EVAL
pre session: supine in bed with bed alarm on. call bell and phone in reach. Monitor and narayan in place. post session: seated in chair with chair alarm . call bell and phone in reach. All lines intact. Pt continues to groan and yell out. Spoke with nsg and telemetry. EKG negative.  Pt states he wants to go home. Tolerated well fair and will benefit from further skilled PT for functional mobility training.

## 2018-11-16 NOTE — PROGRESS NOTE ADULT - SUBJECTIVE AND OBJECTIVE BOX
INTERVAL HPI/OVERNIGHT EVENTS: 78 y.o. male with PMH known aortic aneurysm, carotid stenosis, colonic obstruction s/p hemicolectomy, neurogenic bladder, prostate cancer presents with chest pain woke him up. Pt reports 7/10 substernal with radiation up the esophagus that is burning sensation. Denies other radiation. Currently, reports improvement with chest pain. Denies fever, chills, SOB, abd pain. Pt reports having b/l nephrostomy tubes and next change in 2 weeks. Reports on chemotherapy at the [facility] and doesn't remember the doctor's name. Pt reports that [facility] "doesn't tell me anything". No n/v. Pt reports taking crack cocaine 2 days ago and "a lot" in quantity. Pt doesn't remember all the names/dose of his medications.    11/15/18- Patient seen and examined. Daughter present at bedside. Patient states currently he feels well. Overnight had RRT called for chest pain, patient states it was severe GERD and no one was paying attention to him so he got agitated. Patient received dilaudid 1mg IV x1 and pain resolved and patient went to sleep.  18- Patient seen and examined. Patient stating he wants to leave AMA, then complaining of chest pain. Explained to patient he is not medically stable for discharge with the current pain. Patient started yelling/screaming. Psych evaluated patient, patient does not have capacity at this time (stated he did not know he has cancer).    MEDICATIONS  (STANDING):  aspirin  chewable 81 milliGRAM(s) Oral daily  heparin  Injectable 5000 Unit(s) SubCutaneous every 12 hours  influenza   Vaccine 0.5 milliLiter(s) IntraMuscular once  pantoprazole    Tablet 40 milliGRAM(s) Oral two times a day  sodium chloride 0.9%. 1000 milliLiter(s) (100 mL/Hr) IV Continuous <Continuous>    MEDICATIONS  (PRN):  acetaminophen   Tablet .. 650 milliGRAM(s) Oral every 6 hours PRN Temp greater or equal to 38C (100.4F), Mild Pain (1 - 3)  aluminum hydroxide/magnesium hydroxide/simethicone Suspension 30 milliLiter(s) Oral every 4 hours PRN Dyspepsia  ondansetron Injectable 4 milliGRAM(s) IV Push every 6 hours PRN Nausea and/or Vomiting  oxybutynin 5 milliGRAM(s) Oral two times a day PRN Bladder spasms  oxyCODONE    5 mG/acetaminophen 325 mG 2 Tablet(s) Oral every 4 hours PRN Severe Pain (7 - 10)      Allergies    morphine (Vomiting)  sulfa drugs (Other)    Intolerances      ROS:  CONSTITUTIONAL: No weakness, fevers or chills  EYES/ENT: No visual changes;  No vertigo or throat pain   NECK: No pain or stiffness  RESPIRATORY: No cough, wheezing, hemoptysis; No shortness of breath  CARDIOVASCULAR: +chest pain  GASTROINTESTINAL: No abdominal or epigastric pain. No nausea, vomiting, or hematemesis; No diarrhea or constipation. No melena or hematochezia.  GENITOURINARY: No dysuria, frequency or hematuria  NEUROLOGICAL: No numbness or weakness  SKIN: No itching, burning, rashes, or lesions   All other review of systems is negative unless indicated above.    Vital Signs Last 24 Hrs  T(C): 36.8 (2018 11:16), Max: 36.9 (2018 06:05)  T(F): 98.3 (2018 11:16), Max: 98.4 (2018 06:05)  HR: 83 (2018 11:16) (71 - 87)  BP: 132/76 (2018 11:16) (99/55 - 132/76)  BP(mean): --  RR: 17 (2018 11:16) (16 - 18)  SpO2: 95% (2018 11:16) (91% - 95%)    -15 @ 07:01  -  -16 @ 07:00  --------------------------------------------------------  IN: 300 mL / OUT: 550 mL / NET: -250 mL        Physical Exam:  General: WN/WD NAD  Neurology: A&Ox3, nonfocal, PARSONS x 4  Respiratory: CTA B/L  CV: RRR, S1S2, no murmurs, rubs or gallops  Abdominal: Soft, NT, ND +BS  Extremities: No edema, + peripheral pulses      LABS:                        9.2    4.58  )-----------( 137      ( 2018 07:08 )             30.3     11-16    145  |  110<H>  |  15  ----------------------------<  93  3.6   |  28  |  0.89    Ca    8.1<L>      2018 07:08        Urinalysis Basic - ( 15 Nov 2018 03:15 )    Color: Yellow / Appearance: Slightly Turbid / S.005 / pH: x  Gluc: x / Ketone: Negative  / Bili: Negative / Urobili: Negative mg/dL   Blood: x / Protein: 100 mg/dL / Nitrite: Negative   Leuk Esterase: Moderate / RBC: 3-5 /HPF / WBC >50   Sq Epi: x / Non Sq Epi: Occasional / Bacteria: TNTC        RADIOLOGY & ADDITIONAL TESTS:

## 2018-11-16 NOTE — DIETITIAN INITIAL EVALUATION ADULT. - PERTINENT LABORATORY DATA
11-16 Na145 mmol/L Glu 93 mg/dL K+ 3.6 mmol/L Cr  0.89 mg/dL BUN 15 mg/dL Phos n/a   Alb n/a   PAB n/a

## 2018-11-16 NOTE — ED BEHAVIORAL HEALTH NOTE - BEHAVIORAL HEALTH NOTE
Initially pt presented in pain, could not talk, cried, asked for pain killers.   Once he got meds for pain, 45 min later, he presented as calm and cooperative.   When asked why is he in hospital he could not explain it. When asked if anyone told him that he may have chancer, he stated "nobody told me and I don't have cancer". Per medical team pt was informed that he has a cancer.  Pt uses denial as his defense and  his reasoning is based on denial of medical condition. on that. He does not appreciate his situation. He can express his choices, and he states that he does not want to be discharged from hospital, he just wants pain to go away. Considering that his thinking is based on denial of his medical condition, he does not have mental capacity to discuss his medical health situation and can not sign AMA.

## 2018-11-16 NOTE — CHART NOTE - NSCHARTNOTEFT_GEN_A_CORE
Upon Nutritional Assessment by the Registered Dietitian your patient was determined to meet criteria / has evidence of the following diagnosis/diagnoses:          [ ]  Mild Protein Calorie Malnutrition        [ ]  Moderate Protein Calorie Malnutrition        [x] Severe Protein Calorie Malnutrition        [ ] Unspecified Protein Calorie Malnutrition        [ ] Underweight / BMI <19        [ ] Morbid Obesity / BMI > 40      Findings as based on:  •  Comprehensive nutrition assessment and consultation  •  Calorie counts (nutrient intake analysis)  •  Food acceptance and intake status from observations by staff  •  Follow up  •  Patient education  •  Intervention secondary to interdisciplinary rounds  •   concerns      Treatment:    The following diet has been recommended:  -Encourage PO intake  -Ensure enlive BID  -Monitor weight    PROVIDER Section:     By signing this assessment you are acknowledging and agree with the diagnosis/diagnoses assigned by the Registered Dietitian    Comments:

## 2018-11-16 NOTE — PROGRESS NOTE ADULT - ASSESSMENT
Chest pain-   I think his pain is more so upper abdominal as clearly demonstrated this am.  No further workup for chest pain? complaint.  Cardiac enzymes and EKG did not reveal any ischemia.       Thoracic aortic aneurysm- no prior imaging available and pt does not know of having this before.  Stable features per imaging.  f/u with his cardiologist as outpt.    Prostate cancer with lung nodule and liver mass- likely metastatic malignancy.  oncology team recommending outpt followup.  pain management is recommended.     Other medical issues- Management per primary team.   Thank you for allowing me to participate in the care of this patient. Please feel free to contact me with any questions.

## 2018-11-16 NOTE — DIETITIAN INITIAL EVALUATION ADULT. - OTHER INFO
Pt seen for Low BMI. Pt noted with h/x of severe protein-calorie malnutrition. Pt's pmhx noted below, sig for CAD, HLD, GERDs, Prostate Ca, Gallstone, neurogenic bladder. Pt minimally verbal, appeared tired and did not want to talk also reports some pain. Pt states appetite has not been good and is not eating well (unknown period of time). Pt denies chewing or swallowing difficulty no n/v/d/c. reported. Jian 19. skin intact and no edema. Pt has lost a significant amount of weight over the past 5 months. Pt NFPE revealed Severe muscle wasting in temple, clavicle, deltoid, interosseous, calves and quads. Pt with severe fat wasting in triceps, ribs and occular. Pt meets criteria for severe protein-calorie malnutrition in the context of chronic illness. Recommend ensure enlive BID, encourage PO intake, monitor weight. Will continue to monitor pt's nutritional status.

## 2018-11-16 NOTE — DIETITIAN INITIAL EVALUATION ADULT. - PERTINENT MEDS FT
MEDICATIONS  (STANDING):  aspirin  chewable 81 milliGRAM(s) Oral daily  heparin  Injectable 5000 Unit(s) SubCutaneous every 12 hours  influenza   Vaccine 0.5 milliLiter(s) IntraMuscular once  pantoprazole  Injectable 40 milliGRAM(s) IV Push two times a day  sodium chloride 0.9%. 1000 milliLiter(s) (100 mL/Hr) IV Continuous <Continuous>    MEDICATIONS  (PRN):  acetaminophen   Tablet .. 650 milliGRAM(s) Oral every 6 hours PRN Temp greater or equal to 38C (100.4F), Mild Pain (1 - 3)  aluminum hydroxide/magnesium hydroxide/simethicone Suspension 30 milliLiter(s) Oral every 4 hours PRN Dyspepsia  HYDROmorphone  Injectable 1 milliGRAM(s) IV Push every 3 hours PRN Severe Pain (7 - 10)  ondansetron Injectable 4 milliGRAM(s) IV Push every 6 hours PRN Nausea and/or Vomiting  oxybutynin 5 milliGRAM(s) Oral two times a day PRN Bladder spasms

## 2018-11-17 RX ADMIN — OXYCODONE AND ACETAMINOPHEN 2 TABLET(S): 5; 325 TABLET ORAL at 08:01

## 2018-11-17 RX ADMIN — Medication 650 MILLIGRAM(S): at 11:51

## 2018-11-17 RX ADMIN — OXYCODONE AND ACETAMINOPHEN 2 TABLET(S): 5; 325 TABLET ORAL at 17:32

## 2018-11-17 RX ADMIN — OXYCODONE AND ACETAMINOPHEN 2 TABLET(S): 5; 325 TABLET ORAL at 18:02

## 2018-11-17 RX ADMIN — Medication 30 MILLILITER(S): at 14:51

## 2018-11-17 RX ADMIN — Medication 650 MILLIGRAM(S): at 21:07

## 2018-11-17 RX ADMIN — PANTOPRAZOLE SODIUM 40 MILLIGRAM(S): 20 TABLET, DELAYED RELEASE ORAL at 17:32

## 2018-11-17 RX ADMIN — Medication 81 MILLIGRAM(S): at 11:21

## 2018-11-17 RX ADMIN — Medication 650 MILLIGRAM(S): at 11:21

## 2018-11-17 RX ADMIN — OXYCODONE AND ACETAMINOPHEN 2 TABLET(S): 5; 325 TABLET ORAL at 07:31

## 2018-11-17 NOTE — PROVIDER CONTACT NOTE (OTHER) - SITUATION
Pt with neurogenic bladder has b/l nephrostomy tubes in place draining into collection bags Pt with neurogenic bladder has b/l nephrostomy tubes in place draining into collection bags. Pt c/o of pain at left nephrostomy site. Dressing saturated.

## 2018-11-17 NOTE — CHART NOTE - NSCHARTNOTEFT_GEN_A_CORE
Nurse called stating that pt's nephrostomy tube was leaking and pt says he was having chest pain. Went to see the pt and was sleeping and when awakened pt was smiling and did not have any pain. The gauze on the nephrostomy tube did not look wet. Told the nurse to keep an eye on it and to reinforce it with more tape. Should it worsen told the nurse to call me back will consider urology consult.

## 2018-11-17 NOTE — PROVIDER CONTACT NOTE (OTHER) - BACKGROUND
Admitted on 11/14/18 with chest pain. PMH CAD, HLD, GERD, prostate CA with mets ,AAA neurogenic bladder with b/l nephrostomy tubes.

## 2018-11-17 NOTE — PROGRESS NOTE ADULT - SUBJECTIVE AND OBJECTIVE BOX
INTERVAL HPI/OVERNIGHT EVENTS:  78 y.o. male with PMH known aortic aneurysm, carotid stenosis, colonic obstruction s/p hemicolectomy, neurogenic bladder, prostate cancer presents with chest pain woke him up. Pt reports 7/10 substernal with radiation up the esophagus that is burning sensation. Denies other radiation. Currently, reports improvement with chest pain. Denies fever, chills, SOB, abd pain. Pt reports having b/l nephrostomy tubes and next change in 2 weeks. Reports on chemotherapy at the [facility] and doesn't remember the doctor's name. Pt reports that [facility] "doesn't tell me anything". No n/v. Pt reports taking crack cocaine 2 days ago and "a lot" in quantity. Pt doesn't remember all the names/dose of his medications.    11/15/18- Patient seen and examined. Daughter present at bedside. Patient states currently he feels well. Overnight had RRT called for chest pain, patient states it was severe GERD and no one was paying attention to him so he got agitated. Patient received dilaudid 1mg IV x1 and pain resolved and patient went to sleep.  11/16/18- Patient seen and examined. Patient stating he wants to leave AMA, then complaining of chest pain. Explained to patient he is not medically stable for discharge with the current pain. Patient started yelling/screaming. Psych evaluated patient, patient does not have capacity at this time (stated he did not know he has cancer).  11/17/18- Patient seen and examined at bedside. States he feels better today, pain better controlled, patient states he does not remember becoming agitated yesterday or requesting to sign out AMA. States he wants to get treated for his pain and cancer.    MEDICATIONS  (STANDING):  aspirin  chewable 81 milliGRAM(s) Oral daily  heparin  Injectable 5000 Unit(s) SubCutaneous every 12 hours  influenza   Vaccine 0.5 milliLiter(s) IntraMuscular once  pantoprazole    Tablet 40 milliGRAM(s) Oral two times a day  sodium chloride 0.9%. 1000 milliLiter(s) (100 mL/Hr) IV Continuous <Continuous>    MEDICATIONS  (PRN):  acetaminophen   Tablet .. 650 milliGRAM(s) Oral every 6 hours PRN Temp greater or equal to 38C (100.4F), Mild Pain (1 - 3)  aluminum hydroxide/magnesium hydroxide/simethicone Suspension 30 milliLiter(s) Oral every 4 hours PRN Dyspepsia  ondansetron Injectable 4 milliGRAM(s) IV Push every 6 hours PRN Nausea and/or Vomiting  oxybutynin 5 milliGRAM(s) Oral two times a day PRN Bladder spasms  oxyCODONE    5 mG/acetaminophen 325 mG 2 Tablet(s) Oral every 4 hours PRN Severe Pain (7 - 10)      Allergies    morphine (Vomiting)  sulfa drugs (Other)    Intolerances      ROS:  CONSTITUTIONAL: + weakness, no fevers or chills  EYES/ENT: No visual changes;  No vertigo or throat pain   NECK: No pain or stiffness  RESPIRATORY: No cough, wheezing, hemoptysis; No shortness of breath  CARDIOVASCULAR: No chest pain or palpitations  GASTROINTESTINAL: No abdominal or epigastric pain. No nausea, vomiting, or hematemesis; No diarrhea or constipation. No melena or hematochezia.  GENITOURINARY: No dysuria, frequency or hematuria  NEUROLOGICAL: No numbness  SKIN: No itching, burning, rashes, or lesions   All other review of systems is negative unless indicated above.    Vital Signs Last 24 Hrs  T(C): 36.7 (17 Nov 2018 10:48), Max: 36.7 (16 Nov 2018 21:31)  T(F): 98.1 (17 Nov 2018 10:48), Max: 98.1 (17 Nov 2018 05:13)  HR: 79 (17 Nov 2018 10:48) (72 - 79)  BP: 126/76 (17 Nov 2018 10:48) (104/59 - 126/76)  BP(mean): --  RR: 18 (17 Nov 2018 10:48) (18 - 18)  SpO2: 96% (17 Nov 2018 10:48) (95% - 96%)    11-16 @ 07:01  -  11-17 @ 07:00  --------------------------------------------------------  IN: 0 mL / OUT: 1000 mL / NET: -1000 mL    11-17 @ 07:01  -  11-17 @ 15:27  --------------------------------------------------------  IN: 0 mL / OUT: 250 mL / NET: -250 mL        Physical Exam:  General: WN/WD NAD  Neurology: A&Ox3, nonfocal, PARSONS x 4  Respiratory: CTA B/L  CV: RRR, S1S2, no murmurs, rubs or gallops  Abdominal: Soft, NT, ND +BS  Extremities: No edema, + peripheral pulses      LABS:                        9.2    4.58  )-----------( 137      ( 16 Nov 2018 07:08 )             30.3     11-16    145  |  110<H>  |  15  ----------------------------<  93  3.6   |  28  |  0.89    Ca    8.1<L>      16 Nov 2018 07:08            RADIOLOGY & ADDITIONAL TESTS:

## 2018-11-18 ENCOUNTER — TRANSCRIPTION ENCOUNTER (OUTPATIENT)
Age: 78
End: 2018-11-18

## 2018-11-18 VITALS
OXYGEN SATURATION: 94 % | SYSTOLIC BLOOD PRESSURE: 113 MMHG | RESPIRATION RATE: 18 BRPM | HEART RATE: 79 BPM | DIASTOLIC BLOOD PRESSURE: 71 MMHG | TEMPERATURE: 99 F

## 2018-11-18 LAB
ANION GAP SERPL CALC-SCNC: 8 MMOL/L — SIGNIFICANT CHANGE UP (ref 5–17)
BUN SERPL-MCNC: 20 MG/DL — SIGNIFICANT CHANGE UP (ref 7–23)
CALCIUM SERPL-MCNC: 8 MG/DL — LOW (ref 8.5–10.1)
CHLORIDE SERPL-SCNC: 109 MMOL/L — HIGH (ref 96–108)
CO2 SERPL-SCNC: 28 MMOL/L — SIGNIFICANT CHANGE UP (ref 22–31)
CREAT SERPL-MCNC: 0.96 MG/DL — SIGNIFICANT CHANGE UP (ref 0.5–1.3)
GLUCOSE SERPL-MCNC: 92 MG/DL — SIGNIFICANT CHANGE UP (ref 70–99)
HCT VFR BLD CALC: 31.4 % — LOW (ref 39–50)
HGB BLD-MCNC: 9.6 G/DL — LOW (ref 13–17)
MCHC RBC-ENTMCNC: 26.8 PG — LOW (ref 27–34)
MCHC RBC-ENTMCNC: 30.6 GM/DL — LOW (ref 32–36)
MCV RBC AUTO: 87.7 FL — SIGNIFICANT CHANGE UP (ref 80–100)
NRBC # BLD: 0 /100 WBCS — SIGNIFICANT CHANGE UP (ref 0–0)
PLATELET # BLD AUTO: 147 K/UL — LOW (ref 150–400)
POTASSIUM SERPL-MCNC: 3.7 MMOL/L — SIGNIFICANT CHANGE UP (ref 3.5–5.3)
POTASSIUM SERPL-SCNC: 3.7 MMOL/L — SIGNIFICANT CHANGE UP (ref 3.5–5.3)
RBC # BLD: 3.58 M/UL — LOW (ref 4.2–5.8)
RBC # FLD: 16.9 % — HIGH (ref 10.3–14.5)
SODIUM SERPL-SCNC: 145 MMOL/L — SIGNIFICANT CHANGE UP (ref 135–145)
WBC # BLD: 4.67 K/UL — SIGNIFICANT CHANGE UP (ref 3.8–10.5)
WBC # FLD AUTO: 4.67 K/UL — SIGNIFICANT CHANGE UP (ref 3.8–10.5)

## 2018-11-18 RX ORDER — CEFUROXIME AXETIL 250 MG
1 TABLET ORAL
Qty: 20 | Refills: 0 | OUTPATIENT
Start: 2018-11-18 | End: 2018-11-28

## 2018-11-18 RX ORDER — OMEPRAZOLE 10 MG/1
0 CAPSULE, DELAYED RELEASE ORAL
Qty: 0 | Refills: 0 | COMMUNITY

## 2018-11-18 RX ADMIN — PANTOPRAZOLE SODIUM 40 MILLIGRAM(S): 20 TABLET, DELAYED RELEASE ORAL at 05:33

## 2018-11-18 RX ADMIN — Medication 81 MILLIGRAM(S): at 12:01

## 2018-11-18 RX ADMIN — OXYCODONE AND ACETAMINOPHEN 2 TABLET(S): 5; 325 TABLET ORAL at 08:12

## 2018-11-18 RX ADMIN — OXYCODONE AND ACETAMINOPHEN 2 TABLET(S): 5; 325 TABLET ORAL at 10:00

## 2018-11-18 RX ADMIN — INFLUENZA VIRUS VACCINE 0.5 MILLILITER(S): 15; 15; 15; 15 SUSPENSION INTRAMUSCULAR at 12:01

## 2018-11-18 RX ADMIN — Medication 30 MILLILITER(S): at 08:01

## 2018-11-18 RX ADMIN — OXYCODONE AND ACETAMINOPHEN 2 TABLET(S): 5; 325 TABLET ORAL at 15:27

## 2018-11-18 RX ADMIN — Medication 30 MILLILITER(S): at 15:27

## 2018-11-18 NOTE — DISCHARGE NOTE ADULT - PATIENT PORTAL LINK FT
You can access the [facility] Patient Portal, offered by [facility], by registering with the following website: [email or web address]

## 2018-11-18 NOTE — DISCHARGE NOTE ADULT - NSTOBACCOHOTLINE_GEN_A_CS
[facility] Smokers Quitline ([id number]) [facility] Smokers Quitline ([id number])

## 2018-11-18 NOTE — DISCHARGE NOTE ADULT - MEDICATION SUMMARY - MEDICATIONS TO TAKE
I will START or STAY ON the medications listed below when I get home from the hospital:    aspirin 81 mg oral tablet, chewable  -- 1 tab(s) by mouth once a day  -- Indication: For CAD (coronary artery disease)    Percocet 5/325 oral tablet  -- 1 tab(s) by mouth every 6 hours, As Needed -Severe Pain (7 - 10) MDD:4 tabs  -- Indication: For Carcinomatosis peritonei    tamsulosin 0.4 mg oral capsule  -- 1 cap(s) by mouth once a day  -- Indication: For bph    Lipitor  -- Indication: For HLD (hyperlipidemia)    omeprazole 40 mg oral delayed release capsule  -- 1 cap(s) by mouth 2 times a day  -- Indication: For GERD (gastroesophageal reflux disease)    oxybutynin 5 mg oral tablet  -- 1 tab(s) by mouth 3 times a day, As Needed  -- Indication: For bph

## 2018-11-18 NOTE — DISCHARGE NOTE ADULT - HOSPITAL COURSE
78 y.o. male with PMH known aortic aneurysm, carotid stenosis, colonic obstruction s/p hemicolectomy, neurogenic bladder, prostate cancer presents with chest pain woke him up. Pt reports 7/10 substernal with radiation up the esophagus that is burning sensation. Denies other radiation. Currently, reports improvement with chest pain. Denies fever, chills, SOB, abd pain. Pt reports having b/l nephrostomy tubes and next change in 2 weeks. Reports on chemotherapy at the [facility] and doesn't remember the doctor's name. Pt reports that [facility] "doesn't tell me anything". No n/v. Pt reports taking crack cocaine 2 days ago and "a lot" in quantity. Pt doesn't remember all the names/dose of his medications.    11/15/18- Patient seen and examined. Daughter present at bedside. Patient states currently he feels well. Overnight had RRT called for chest pain, patient states it was severe GERD and no one was paying attention to him so he got agitated. Patient received dilaudid 1mg IV x1 and pain resolved and patient went to sleep.  11/16/18- Patient seen and examined. Patient stating he wants to leave AMA, then complaining of chest pain. Explained to patient he is not medically stable for discharge with the current pain. Patient started yelling/screaming. Psych evaluated patient, patient does not have capacity at this time (stated he did not know he has cancer).  11/17/18- Patient seen and examined at bedside. States he feels better today, pain better controlled, patient states he does not remember becoming agitated yesterday or requesting to sign out AMA. States he wants to get treated for his pain and cancer.  11/18/18- Patient seen and examined at bedside. States he feels well, wants to go home, states pain is well controlled and will follow up with PCP and Oncologist regarding his next treatment options. Encouraged patient to stop doing drugs (cocaine), explained the risks of continuing to do drugs including death. Patient expressed understanding and stated he will try. Patient evaluated by Psych, determined to have capacity as this time.     Physical Exam:  General: WN/WD NAD  Neurology: A&Ox3, nonfocal, PARSONS x 4  Respiratory: CTA B/L  CV: RRR, S1S2, no murmurs, rubs or gallops  Abdominal: Soft, NT, ND +BS  Extremities: No edema, + peripheral pulses    #chest pain with recent cocaine use  #abnormal EKG  -chest pain likely from GERD/acid reflux  -cont PPI  -ASA, statin  -EKG noted  -cardio consult, Dr Girard  -pain control, may be related to cancer spreading?    #Delirium/agitation- resolved  -Possible hospital acquired  -Psych consulted- no capacity at this time, but improved today- patient now has capacity    #substance abuse  -UA positive for cocaine- might be source of chest pain  -Counseled patient on drug cessation- patient states he will try to stop    #hypotension  -SBP 80s-90s, pt alert, oriented, awake- better now- 90s-100s  -pt reports SBP ~105 baseline  -will monitor BP    #aortic aneurysm  -per CT report, stable    #prostate cancer on chemo  -pt reports getting chemo at [facility]. He reports that they didn't tell him anything  -CT noted with peritoneal carcinomatosis and new hepatic lobe lesion  -oncology consult, Dr Youssef- recommending outpatient follow up    #advanced care planning  -discussed with patient regarding advanced directives/code status. Pt reports "I've seen people get chest compressions. I want to be DNR". Pt reports he doesn't want to be intubated either.  -pt is DNR/DNI 78 y.o. male with PMH known aortic aneurysm, carotid stenosis, colonic obstruction s/p hemicolectomy, neurogenic bladder, prostate cancer presents with chest pain woke him up. Pt reports 7/10 substernal with radiation up the esophagus that is burning sensation. Denies other radiation. Currently, reports improvement with chest pain. Denies fever, chills, SOB, abd pain. Pt reports having b/l nephrostomy tubes and next change in 2 weeks. Reports on chemotherapy at the [facility] and doesn't remember the doctor's name. Pt reports that [facility] "doesn't tell me anything". No n/v. Pt reports taking crack cocaine 2 days ago and "a lot" in quantity. Pt doesn't remember all the names/dose of his medications.    11/15/18- Patient seen and examined. Daughter present at bedside. Patient states currently he feels well. Overnight had RRT called for chest pain, patient states it was severe GERD and no one was paying attention to him so he got agitated. Patient received dilaudid 1mg IV x1 and pain resolved and patient went to sleep.  11/16/18- Patient seen and examined. Patient stating he wants to leave AMA, then complaining of chest pain. Explained to patient he is not medically stable for discharge with the current pain. Patient started yelling/screaming. Psych evaluated patient, patient does not have capacity at this time (stated he did not know he has cancer).  11/17/18- Patient seen and examined at bedside. States he feels better today, pain better controlled, patient states he does not remember becoming agitated yesterday or requesting to sign out AMA. States he wants to get treated for his pain and cancer.  11/18/18- Patient seen and examined at bedside. States he feels well, wants to go home, states pain is well controlled and will follow up with PCP and Oncologist regarding his next treatment options. Encouraged patient to stop doing drugs (cocaine), explained the risks of continuing to do drugs including death. Patient expressed understanding and stated he will try. Patient evaluated by Psych, determined to have capacity as this time.     Physical Exam:  General: WN/WD NAD  Neurology: A&Ox3, nonfocal, PARSONS x 4  Respiratory: CTA B/L  CV: RRR, S1S2, no murmurs, rubs or gallops  Abdominal: Soft, NT, ND +BS  Extremities: No edema, + peripheral pulses    #chest pain with recent cocaine use  #abnormal EKG  -chest pain likely from GERD/acid reflux  -cont PPI  -ASA, statin  -EKG noted  -cardio consult, Dr Girard  -pain control, may be related to cancer spreading?    #Delirium/agitation- resolved  -Possible hospital acquired  -Psych consulted- no capacity at this time, but improved today- patient now has capacity    #substance abuse  -UA positive for cocaine- might be source of chest pain  -Counseled patient on drug cessation- patient states he will try to stop    #hypotension  -SBP 80s-90s, pt alert, oriented, awake- better now- 90s-100s  -pt reports SBP ~105 baseline  -will monitor BP    #aortic aneurysm  -per CT report, stable    #prostate cancer on chemo  -pt reports getting chemo at [facility]. He reports that they didn't tell him anything  -CT noted with peritoneal carcinomatosis and new hepatic lobe lesion  -oncology consult, Dr Youssef- recommending outpatient follow up  -Percocet for pain  -ISTOP- Reference #- [id number]    #advanced care planning  -discussed with patient regarding advanced directives/code status. Pt reports "I've seen people get chest compressions. I want to be DNR". Pt reports he doesn't want to be intubated either.  -pt is DNR/DNI 78 y.o. male with PMH known aortic aneurysm, carotid stenosis, colonic obstruction s/p hemicolectomy, neurogenic bladder, prostate cancer presents with chest pain woke him up. Pt reports 7/10 substernal with radiation up the esophagus that is burning sensation. Denies other radiation. Currently, reports improvement with chest pain. Denies fever, chills, SOB, abd pain. Pt reports having b/l nephrostomy tubes and next change in 2 weeks. Reports on chemotherapy at the [facility] and doesn't remember the doctor's name. Pt reports that [facility] "doesn't tell me anything". No n/v. Pt reports taking crack cocaine 2 days ago and "a lot" in quantity. Pt doesn't remember all the names/dose of his medications.    11/15/18- Patient seen and examined. Daughter present at bedside. Patient states currently he feels well. Overnight had RRT called for chest pain, patient states it was severe GERD and no one was paying attention to him so he got agitated. Patient received dilaudid 1mg IV x1 and pain resolved and patient went to sleep.  11/16/18- Patient seen and examined. Patient stating he wants to leave AMA, then complaining of chest pain. Explained to patient he is not medically stable for discharge with the current pain. Patient started yelling/screaming. Psych evaluated patient, patient does not have capacity at this time (stated he did not know he has cancer).  11/17/18- Patient seen and examined at bedside. States he feels better today, pain better controlled, patient states he does not remember becoming agitated yesterday or requesting to sign out AMA. States he wants to get treated for his pain and cancer.  11/18/18- Patient seen and examined at bedside. States he feels well, wants to go home, states pain is well controlled and will follow up with PCP and Oncologist regarding his next treatment options. Encouraged patient to stop doing drugs (cocaine), explained the risks of continuing to do drugs including death. Patient expressed understanding and stated he will try. Patient evaluated by Psych, determined to have capacity as this time.     Physical Exam:  General: WN/WD NAD  Neurology: A&Ox3, nonfocal, PARSONS x 4  Respiratory: CTA B/L  CV: RRR, S1S2, no murmurs, rubs or gallops  Abdominal: Soft, NT, ND +BS  Extremities: No edema, + peripheral pulses    #chest pain with recent cocaine use  #abnormal EKG  -chest pain likely from GERD/acid reflux  -cont PPI  -ASA, statin  -EKG noted  -cardio consult, Dr Girard  -pain control, may be related to cancer spreading?    #Delirium/agitation- resolved  -Possible hospital acquired  -Psych consulted- no capacity at this time, but improved today- patient now has capacity    #substance abuse  -UA positive for cocaine- might be source of chest pain  -Counseled patient on drug cessation- patient states he will try to stop    #hypotension  -SBP 80s-90s, pt alert, oriented, awake- better now- 90s-100s  -pt reports SBP ~105 baseline  -will monitor BP    #aortic aneurysm  -per CT report, stable    #prostate cancer on chemo  -pt reports getting chemo at [facility]. He reports that they didn't tell him anything  -CT noted with peritoneal carcinomatosis and new hepatic lobe lesion  -oncology consult, Dr Youssef- recommending outpatient follow up  -Percocet for pain  -ISTOP- Reference #- [id number]    #advanced care planning  -discussed with patient regarding advanced directives/code status. Pt reports "I've seen people get chest compressions. I want to be DNR". Pt reports he doesn't want to be intubated either.  -pt is DNR/DNI    Total time spent on discharge: 34 minutes

## 2018-11-18 NOTE — DISCHARGE NOTE ADULT - CARE PLAN
Principal Discharge DX:	Chest pain with high risk of acute coronary syndrome  Goal:	no more chest/epigastric pain  Assessment and plan of treatment:	-Chest pain workup negative, pain found to be more related to GERD  -Follow up with Cardiologist as outpatient within 2 weeks  Secondary Diagnosis:	Prostate CA  Assessment and plan of treatment:	-Progression of cancer noted on CT scan- bulky retroperitoneal lymphadenopathy as described,   significantly worsened. Extensive peritoneal carcinomatosis involving the small bowel mesentery. New partially exophytic right hepatic lobe lesion may also represent a peritoneal implant, versus pelvic metastasis  -[facility] of [facility] for further treatment options  -Percocet PRN for pain

## 2018-11-18 NOTE — ED BEHAVIORAL HEALTH NOTE - BEHAVIORAL HEALTH NOTE
The consult liaison psychiatry service was asked to provide follow up consultation on Mr. Ghotra, to address the question of whether he has capacity to refuse the medicine team's recommendation of establishing outpatient physical therapy services and visiting nurse services, which he initially refused.  He was encountered that the bedside and noted to be awake, alert, and oriented, participating readily with this assessment.  He denied acute psychiatric symptoms concern.  He acknowledged declining these services to the primary team, expressing a belief that he did not need them because he feels capable of ambulating (he uses a cane at baseline) independently to meet most of his needs, and said that he receives help as needed from family and friends, including assistance making outpatient appointments and addressing outpatient medical concerns.  Despite this, when the rationale for these recommendations were explained further, including his potential to utilize physical therapy services to reduce a fall risk and nursing services to address medical problem care needs in the outpatient setting, he expressed understanding and agreement, and consented to receiving these services.  During this capacity assessment he demonstrated the ability to manipulate information to demonstrate understanding of the potential risks of declining these services, stating that this underlies his current consent.  He denied any further concerns.  Mr. Ghotra does have decision-making capacity to accept or decline these services.

## 2018-11-18 NOTE — DISCHARGE NOTE ADULT - PLAN OF CARE
no more chest/epigastric pain -Chest pain workup negative, pain found to be more related to GERD  -Follow up with Cardiologist as outpatient within 2 weeks -Progression of cancer noted on CT scan- bulky retroperitoneal lymphadenopathy as described,   significantly worsened. Extensive peritoneal carcinomatosis involving the small bowel mesentery. New partially exophytic right hepatic lobe lesion may also represent a peritoneal implant, versus pelvic metastasis  -[facility] within [facility] week of discharge for further treatment options  -Percocet PRN for pain

## 2018-11-18 NOTE — DISCHARGE NOTE ADULT - CARE PROVIDER_API CALL
Franck Girard (DO), Cardiology; Internal Medicine  [address] [place] [address]  Phone: [phone]  Fax: [phone]    [facility],   Phone: (   )    -  Fax: (   )    -

## 2018-11-19 NOTE — CHART NOTE - NSCHARTNOTEFT_GEN_A_CORE
Patient noted to have >100,000 CFU/mL E. coli in Urine culture. Attempted to call patient and emergency contacts with no success. Able to get in touch with patient's daughter- Mare- [phone] and explained the findings. Script for antibiotic sent to the pharmacy. Mare states she will make her father aware of the results and  the medication.

## 2018-11-20 NOTE — CHART NOTE - NSCHARTNOTEFT_GEN_A_CORE
Results from Urine culture drawn 11/16/18 from L nephrostomy tube showed ESBL. Patient was prescribed ceftin 500mg BID yesterday for persumed E. coli UTI. Spoke with ID, unsure if colonization or re-infection as patient had ESBL in June 2018 as well. Although patient was completely asymptomatic while in hospital, afebrile, no leukocytosis, no signs/symptoms of infection, case was discussed with ID and since patient has B/l nephrostomy tubes, he remains a high risk for re-infection and recommendation is for patient to return to hospital for IV abx therapy. Tried to reach out to patient (Rigoberto Ghotra- [phone]), emergency contact (Juan Luis- [phone]) and daughter (Mare- [phone]) numerous times regarding the test results, but no answer. Messages left to call back and need to be hospitalized and receive IV abx therapy.

## 2018-11-21 ENCOUNTER — INPATIENT (INPATIENT)
Facility: HOSPITAL | Age: 78
LOS: 8 days | Discharge: HOSPICE | End: 2018-11-30
Attending: FAMILY MEDICINE | Admitting: FAMILY MEDICINE
Payer: MEDICARE

## 2018-11-21 ENCOUNTER — INPATIENT (INPATIENT)
Facility: HOSPITAL | Age: 78
LOS: 0 days | Discharge: LEFT BEFORE DISCHARGE | End: 2018-11-21
Attending: HOSPITALIST | Admitting: HOSPITALIST
Payer: MEDICARE

## 2018-11-21 VITALS
TEMPERATURE: 97 F | SYSTOLIC BLOOD PRESSURE: 107 MMHG | RESPIRATION RATE: 19 BRPM | OXYGEN SATURATION: 100 % | HEART RATE: 89 BPM | DIASTOLIC BLOOD PRESSURE: 75 MMHG | HEIGHT: 61 IN | WEIGHT: 139.99 LBS

## 2018-11-21 VITALS
SYSTOLIC BLOOD PRESSURE: 104 MMHG | HEART RATE: 78 BPM | OXYGEN SATURATION: 99 % | DIASTOLIC BLOOD PRESSURE: 65 MMHG | RESPIRATION RATE: 17 BRPM | TEMPERATURE: 98 F

## 2018-11-21 VITALS — WEIGHT: 175.05 LBS | HEIGHT: 66 IN

## 2018-11-21 DIAGNOSIS — Z98.89 OTHER SPECIFIED POSTPROCEDURAL STATES: Chronic | ICD-10-CM

## 2018-11-21 DIAGNOSIS — Z98.41 CATARACT EXTRACTION STATUS, RIGHT EYE: Chronic | ICD-10-CM

## 2018-11-21 DIAGNOSIS — Z90.49 ACQUIRED ABSENCE OF OTHER SPECIFIED PARTS OF DIGESTIVE TRACT: Chronic | ICD-10-CM

## 2018-11-21 DIAGNOSIS — Z98.890 OTHER SPECIFIED POSTPROCEDURAL STATES: Chronic | ICD-10-CM

## 2018-11-21 DIAGNOSIS — S22.32XA FRACTURE OF ONE RIB, LEFT SIDE, INITIAL ENCOUNTER FOR CLOSED FRACTURE: Chronic | ICD-10-CM

## 2018-11-21 PROBLEM — E78.5 HYPERLIPIDEMIA, UNSPECIFIED: Chronic | Status: ACTIVE | Noted: 2018-11-14

## 2018-11-21 PROBLEM — K21.9 GASTRO-ESOPHAGEAL REFLUX DISEASE WITHOUT ESOPHAGITIS: Chronic | Status: ACTIVE | Noted: 2018-11-14

## 2018-11-21 PROBLEM — I25.10 ATHEROSCLEROTIC HEART DISEASE OF NATIVE CORONARY ARTERY WITHOUT ANGINA PECTORIS: Chronic | Status: ACTIVE | Noted: 2018-11-14

## 2018-11-21 LAB
ALBUMIN SERPL ELPH-MCNC: 2.4 G/DL — LOW (ref 3.3–5)
ALBUMIN SERPL ELPH-MCNC: 2.6 G/DL — LOW (ref 3.3–5)
ALP SERPL-CCNC: 314 U/L — HIGH (ref 40–120)
ALP SERPL-CCNC: 341 U/L — HIGH (ref 40–120)
ALT FLD-CCNC: 13 U/L — SIGNIFICANT CHANGE UP (ref 12–78)
ALT FLD-CCNC: 17 U/L — SIGNIFICANT CHANGE UP (ref 12–78)
ANION GAP SERPL CALC-SCNC: 7 MMOL/L — SIGNIFICANT CHANGE UP (ref 5–17)
ANION GAP SERPL CALC-SCNC: 9 MMOL/L — SIGNIFICANT CHANGE UP (ref 5–17)
APPEARANCE UR: ABNORMAL
APPEARANCE UR: ABNORMAL
APTT BLD: 32.7 SEC — SIGNIFICANT CHANGE UP (ref 27.5–36.3)
AST SERPL-CCNC: 53 U/L — HIGH (ref 15–37)
AST SERPL-CCNC: 54 U/L — HIGH (ref 15–37)
BACTERIA # UR AUTO: ABNORMAL
BASOPHILS # BLD AUTO: 0.02 K/UL — SIGNIFICANT CHANGE UP (ref 0–0.2)
BASOPHILS # BLD AUTO: 0.03 K/UL — SIGNIFICANT CHANGE UP (ref 0–0.2)
BASOPHILS NFR BLD AUTO: 0.2 % — SIGNIFICANT CHANGE UP (ref 0–2)
BASOPHILS NFR BLD AUTO: 0.3 % — SIGNIFICANT CHANGE UP (ref 0–2)
BILIRUB SERPL-MCNC: 0.5 MG/DL — SIGNIFICANT CHANGE UP (ref 0.2–1.2)
BILIRUB SERPL-MCNC: 0.6 MG/DL — SIGNIFICANT CHANGE UP (ref 0.2–1.2)
BILIRUB UR-MCNC: ABNORMAL
BILIRUB UR-MCNC: NEGATIVE — SIGNIFICANT CHANGE UP
BUN SERPL-MCNC: 26 MG/DL — HIGH (ref 7–23)
BUN SERPL-MCNC: 26 MG/DL — HIGH (ref 7–23)
CALCIUM SERPL-MCNC: 8.2 MG/DL — LOW (ref 8.5–10.1)
CALCIUM SERPL-MCNC: 8.3 MG/DL — LOW (ref 8.5–10.1)
CHLORIDE SERPL-SCNC: 106 MMOL/L — SIGNIFICANT CHANGE UP (ref 96–108)
CHLORIDE SERPL-SCNC: 106 MMOL/L — SIGNIFICANT CHANGE UP (ref 96–108)
CO2 SERPL-SCNC: 24 MMOL/L — SIGNIFICANT CHANGE UP (ref 22–31)
CO2 SERPL-SCNC: 29 MMOL/L — SIGNIFICANT CHANGE UP (ref 22–31)
COLOR SPEC: YELLOW — SIGNIFICANT CHANGE UP
COLOR SPEC: YELLOW — SIGNIFICANT CHANGE UP
CREAT SERPL-MCNC: 1.18 MG/DL — SIGNIFICANT CHANGE UP (ref 0.5–1.3)
CREAT SERPL-MCNC: 1.21 MG/DL — SIGNIFICANT CHANGE UP (ref 0.5–1.3)
DIFF PNL FLD: ABNORMAL
DIFF PNL FLD: ABNORMAL
EOSINOPHIL # BLD AUTO: 0.01 K/UL — SIGNIFICANT CHANGE UP (ref 0–0.5)
EOSINOPHIL # BLD AUTO: 0.01 K/UL — SIGNIFICANT CHANGE UP (ref 0–0.5)
EOSINOPHIL NFR BLD AUTO: 0.1 % — SIGNIFICANT CHANGE UP (ref 0–6)
EOSINOPHIL NFR BLD AUTO: 0.1 % — SIGNIFICANT CHANGE UP (ref 0–6)
EPI CELLS # UR: SIGNIFICANT CHANGE UP
GLUCOSE SERPL-MCNC: 100 MG/DL — HIGH (ref 70–99)
GLUCOSE SERPL-MCNC: 108 MG/DL — HIGH (ref 70–99)
GLUCOSE UR QL: NEGATIVE MG/DL — SIGNIFICANT CHANGE UP
GLUCOSE UR QL: NEGATIVE MG/DL — SIGNIFICANT CHANGE UP
HCT VFR BLD CALC: 33.7 % — LOW (ref 39–50)
HCT VFR BLD CALC: 34.2 % — LOW (ref 39–50)
HGB BLD-MCNC: 10.3 G/DL — LOW (ref 13–17)
HGB BLD-MCNC: 10.4 G/DL — LOW (ref 13–17)
IMM GRANULOCYTES NFR BLD AUTO: 0.9 % — SIGNIFICANT CHANGE UP (ref 0–1.5)
IMM GRANULOCYTES NFR BLD AUTO: 1.4 % — SIGNIFICANT CHANGE UP (ref 0–1.5)
INR BLD: 1.57 RATIO — HIGH (ref 0.88–1.16)
KETONES UR-MCNC: ABNORMAL
KETONES UR-MCNC: ABNORMAL
LEUKOCYTE ESTERASE UR-ACNC: ABNORMAL
LEUKOCYTE ESTERASE UR-ACNC: ABNORMAL
LIDOCAIN IGE QN: 50 U/L — LOW (ref 73–393)
LYMPHOCYTES # BLD AUTO: 0.54 K/UL — LOW (ref 1–3.3)
LYMPHOCYTES # BLD AUTO: 0.58 K/UL — LOW (ref 1–3.3)
LYMPHOCYTES # BLD AUTO: 5.4 % — LOW (ref 13–44)
LYMPHOCYTES # BLD AUTO: 6.3 % — LOW (ref 13–44)
MAGNESIUM SERPL-MCNC: 2.2 MG/DL — SIGNIFICANT CHANGE UP (ref 1.6–2.6)
MCHC RBC-ENTMCNC: 27 PG — SIGNIFICANT CHANGE UP (ref 27–34)
MCHC RBC-ENTMCNC: 27.1 PG — SIGNIFICANT CHANGE UP (ref 27–34)
MCHC RBC-ENTMCNC: 30.4 GM/DL — LOW (ref 32–36)
MCHC RBC-ENTMCNC: 30.6 GM/DL — LOW (ref 32–36)
MCV RBC AUTO: 88.7 FL — SIGNIFICANT CHANGE UP (ref 80–100)
MCV RBC AUTO: 88.8 FL — SIGNIFICANT CHANGE UP (ref 80–100)
MONOCYTES # BLD AUTO: 0.74 K/UL — SIGNIFICANT CHANGE UP (ref 0–0.9)
MONOCYTES # BLD AUTO: 0.76 K/UL — SIGNIFICANT CHANGE UP (ref 0–0.9)
MONOCYTES NFR BLD AUTO: 7.4 % — SIGNIFICANT CHANGE UP (ref 2–14)
MONOCYTES NFR BLD AUTO: 8.3 % — SIGNIFICANT CHANGE UP (ref 2–14)
NEUTROPHILS # BLD AUTO: 7.7 K/UL — HIGH (ref 1.8–7.4)
NEUTROPHILS # BLD AUTO: 8.56 K/UL — HIGH (ref 1.8–7.4)
NEUTROPHILS NFR BLD AUTO: 84.1 % — HIGH (ref 43–77)
NEUTROPHILS NFR BLD AUTO: 85.5 % — HIGH (ref 43–77)
NITRITE UR-MCNC: NEGATIVE — SIGNIFICANT CHANGE UP
NITRITE UR-MCNC: POSITIVE
NRBC # BLD: 0 /100 WBCS — SIGNIFICANT CHANGE UP (ref 0–0)
PH UR: 5 — SIGNIFICANT CHANGE UP (ref 5–8)
PH UR: 5 — SIGNIFICANT CHANGE UP (ref 5–8)
PHOSPHATE SERPL-MCNC: 3.3 MG/DL — SIGNIFICANT CHANGE UP (ref 2.5–4.5)
PLATELET # BLD AUTO: 128 K/UL — LOW (ref 150–400)
PLATELET # BLD AUTO: 142 K/UL — LOW (ref 150–400)
POTASSIUM SERPL-MCNC: 4.3 MMOL/L — SIGNIFICANT CHANGE UP (ref 3.5–5.3)
POTASSIUM SERPL-MCNC: 5.4 MMOL/L — HIGH (ref 3.5–5.3)
POTASSIUM SERPL-SCNC: 4.3 MMOL/L — SIGNIFICANT CHANGE UP (ref 3.5–5.3)
POTASSIUM SERPL-SCNC: 5.4 MMOL/L — HIGH (ref 3.5–5.3)
PROT SERPL-MCNC: 6.3 GM/DL — SIGNIFICANT CHANGE UP (ref 6–8.3)
PROT SERPL-MCNC: 6.5 GM/DL — SIGNIFICANT CHANGE UP (ref 6–8.3)
PROT UR-MCNC: 100 MG/DL
PROT UR-MCNC: 500 MG/DL
PROTHROM AB SERPL-ACNC: 17.7 SEC — HIGH (ref 10–12.9)
RBC # BLD: 3.8 M/UL — LOW (ref 4.2–5.8)
RBC # BLD: 3.85 M/UL — LOW (ref 4.2–5.8)
RBC # FLD: 16.8 % — HIGH (ref 10.3–14.5)
RBC # FLD: 17.2 % — HIGH (ref 10.3–14.5)
RBC CASTS # UR COMP ASSIST: ABNORMAL /HPF (ref 0–4)
SODIUM SERPL-SCNC: 139 MMOL/L — SIGNIFICANT CHANGE UP (ref 135–145)
SODIUM SERPL-SCNC: 142 MMOL/L — SIGNIFICANT CHANGE UP (ref 135–145)
SP GR SPEC: 1.02 — SIGNIFICANT CHANGE UP (ref 1.01–1.02)
SP GR SPEC: 1.02 — SIGNIFICANT CHANGE UP (ref 1.01–1.02)
TROPONIN I SERPL-MCNC: 0.02 NG/ML — SIGNIFICANT CHANGE UP (ref 0.01–0.04)
TROPONIN I SERPL-MCNC: 0.02 NG/ML — SIGNIFICANT CHANGE UP (ref 0.01–0.04)
TROPONIN I SERPL-MCNC: <0.015 NG/ML — SIGNIFICANT CHANGE UP (ref 0.01–0.04)
UROBILINOGEN FLD QL: 1 MG/DL
UROBILINOGEN FLD QL: NEGATIVE MG/DL — SIGNIFICANT CHANGE UP
WBC # BLD: 10.01 K/UL — SIGNIFICANT CHANGE UP (ref 3.8–10.5)
WBC # BLD: 9.16 K/UL — SIGNIFICANT CHANGE UP (ref 3.8–10.5)
WBC # FLD AUTO: 10.01 K/UL — SIGNIFICANT CHANGE UP (ref 3.8–10.5)
WBC # FLD AUTO: 9.16 K/UL — SIGNIFICANT CHANGE UP (ref 3.8–10.5)
WBC UR QL: >50

## 2018-11-21 PROCEDURE — 93010 ELECTROCARDIOGRAM REPORT: CPT | Mod: 76

## 2018-11-21 PROCEDURE — 71045 X-RAY EXAM CHEST 1 VIEW: CPT | Mod: 26

## 2018-11-21 PROCEDURE — 99285 EMERGENCY DEPT VISIT HI MDM: CPT

## 2018-11-21 RX ORDER — ENOXAPARIN SODIUM 100 MG/ML
40 INJECTION SUBCUTANEOUS EVERY 24 HOURS
Qty: 0 | Refills: 0 | Status: DISCONTINUED | OUTPATIENT
Start: 2018-11-21 | End: 2018-11-21

## 2018-11-21 RX ORDER — ERTAPENEM SODIUM 1 G/1
1000 INJECTION, POWDER, LYOPHILIZED, FOR SOLUTION INTRAMUSCULAR; INTRAVENOUS ONCE
Qty: 0 | Refills: 0 | Status: COMPLETED | OUTPATIENT
Start: 2018-11-21 | End: 2018-11-21

## 2018-11-21 RX ORDER — OXYCODONE AND ACETAMINOPHEN 5; 325 MG/1; MG/1
1 TABLET ORAL EVERY 6 HOURS
Qty: 0 | Refills: 0 | Status: DISCONTINUED | OUTPATIENT
Start: 2018-11-21 | End: 2018-11-21

## 2018-11-21 RX ORDER — TAMSULOSIN HYDROCHLORIDE 0.4 MG/1
1 CAPSULE ORAL
Qty: 0 | Refills: 0 | COMMUNITY

## 2018-11-21 RX ORDER — NITROFURANTOIN MACROCRYSTAL 50 MG
100 CAPSULE ORAL ONCE
Qty: 0 | Refills: 0 | Status: COMPLETED | OUTPATIENT
Start: 2018-11-21 | End: 2018-11-21

## 2018-11-21 RX ORDER — MEROPENEM 1 G/30ML
1000 INJECTION INTRAVENOUS ONCE
Qty: 0 | Refills: 0 | Status: COMPLETED | OUTPATIENT
Start: 2018-11-21 | End: 2018-11-21

## 2018-11-21 RX ORDER — PANTOPRAZOLE SODIUM 20 MG/1
40 TABLET, DELAYED RELEASE ORAL
Qty: 0 | Refills: 0 | Status: DISCONTINUED | OUTPATIENT
Start: 2018-11-21 | End: 2018-11-21

## 2018-11-21 RX ORDER — OXYBUTYNIN CHLORIDE 5 MG
5 TABLET ORAL ONCE
Qty: 0 | Refills: 0 | Status: DISCONTINUED | OUTPATIENT
Start: 2018-11-21 | End: 2018-11-21

## 2018-11-21 RX ORDER — ASPIRIN/CALCIUM CARB/MAGNESIUM 324 MG
1 TABLET ORAL
Qty: 0 | Refills: 0 | COMMUNITY

## 2018-11-21 RX ORDER — TAMSULOSIN HYDROCHLORIDE 0.4 MG/1
0.4 CAPSULE ORAL AT BEDTIME
Qty: 0 | Refills: 0 | Status: DISCONTINUED | OUTPATIENT
Start: 2018-11-21 | End: 2018-11-21

## 2018-11-21 RX ORDER — SODIUM CHLORIDE 9 MG/ML
1000 INJECTION INTRAMUSCULAR; INTRAVENOUS; SUBCUTANEOUS ONCE
Qty: 0 | Refills: 0 | Status: COMPLETED | OUTPATIENT
Start: 2018-11-21 | End: 2018-11-21

## 2018-11-21 RX ORDER — HYDROMORPHONE HYDROCHLORIDE 2 MG/ML
0.5 INJECTION INTRAMUSCULAR; INTRAVENOUS; SUBCUTANEOUS ONCE
Qty: 0 | Refills: 0 | Status: DISCONTINUED | OUTPATIENT
Start: 2018-11-21 | End: 2018-11-21

## 2018-11-21 RX ORDER — ATORVASTATIN CALCIUM 80 MG/1
0 TABLET, FILM COATED ORAL
Qty: 0 | Refills: 0 | COMMUNITY

## 2018-11-21 RX ORDER — NITROFURANTOIN MACROCRYSTAL 50 MG
1 CAPSULE ORAL
Qty: 20 | Refills: 0 | OUTPATIENT
Start: 2018-11-21 | End: 2018-11-30

## 2018-11-21 RX ORDER — SODIUM CHLORIDE 9 MG/ML
500 INJECTION INTRAMUSCULAR; INTRAVENOUS; SUBCUTANEOUS ONCE
Qty: 0 | Refills: 0 | Status: COMPLETED | OUTPATIENT
Start: 2018-11-21 | End: 2018-11-21

## 2018-11-21 RX ORDER — OMEPRAZOLE 10 MG/1
1 CAPSULE, DELAYED RELEASE ORAL
Qty: 0 | Refills: 0 | COMMUNITY

## 2018-11-21 RX ORDER — ATORVASTATIN CALCIUM 80 MG/1
10 TABLET, FILM COATED ORAL ONCE
Qty: 0 | Refills: 0 | Status: DISCONTINUED | OUTPATIENT
Start: 2018-11-21 | End: 2018-11-21

## 2018-11-21 RX ORDER — ASPIRIN/CALCIUM CARB/MAGNESIUM 324 MG
81 TABLET ORAL DAILY
Qty: 0 | Refills: 0 | Status: DISCONTINUED | OUTPATIENT
Start: 2018-11-21 | End: 2018-11-21

## 2018-11-21 RX ADMIN — HYDROMORPHONE HYDROCHLORIDE 0.5 MILLIGRAM(S): 2 INJECTION INTRAMUSCULAR; INTRAVENOUS; SUBCUTANEOUS at 05:00

## 2018-11-21 RX ADMIN — Medication 100 MILLIGRAM(S): at 06:39

## 2018-11-21 RX ADMIN — HYDROMORPHONE HYDROCHLORIDE 0.5 MILLIGRAM(S): 2 INJECTION INTRAMUSCULAR; INTRAVENOUS; SUBCUTANEOUS at 05:30

## 2018-11-21 RX ADMIN — SODIUM CHLORIDE 500 MILLILITER(S): 9 INJECTION INTRAMUSCULAR; INTRAVENOUS; SUBCUTANEOUS at 07:15

## 2018-11-21 RX ADMIN — SODIUM CHLORIDE 1000 MILLILITER(S): 9 INJECTION INTRAMUSCULAR; INTRAVENOUS; SUBCUTANEOUS at 21:05

## 2018-11-21 RX ADMIN — SODIUM CHLORIDE 500 MILLILITER(S): 9 INJECTION INTRAMUSCULAR; INTRAVENOUS; SUBCUTANEOUS at 06:15

## 2018-11-21 RX ADMIN — ERTAPENEM SODIUM 120 MILLIGRAM(S): 1 INJECTION, POWDER, LYOPHILIZED, FOR SOLUTION INTRAMUSCULAR; INTRAVENOUS at 21:03

## 2018-11-21 RX ADMIN — SODIUM CHLORIDE 2000 MILLILITER(S): 9 INJECTION INTRAMUSCULAR; INTRAVENOUS; SUBCUTANEOUS at 04:50

## 2018-11-21 RX ADMIN — MEROPENEM 100 MILLIGRAM(S): 1 INJECTION INTRAVENOUS at 08:45

## 2018-11-21 RX ADMIN — ERTAPENEM SODIUM 1000 MILLIGRAM(S): 1 INJECTION, POWDER, LYOPHILIZED, FOR SOLUTION INTRAMUSCULAR; INTRAVENOUS at 21:33

## 2018-11-21 RX ADMIN — MEROPENEM 1000 MILLIGRAM(S): 1 INJECTION INTRAVENOUS at 09:15

## 2018-11-21 NOTE — ED ADULT NURSE NOTE - NSIMPLEMENTINTERV_GEN_ALL_ED
Implemented All Fall with Harm Risk Interventions:  [place] to call system. Call bell, personal items and telephone within reach. Instruct patient to call for assistance. Room bathroom lighting operational. Non-slip footwear when patient is off stretcher. Physically safe environment: no spills, clutter or unnecessary equipment. Stretcher in lowest position, wheels locked, appropriate side rails in place. Provide visual cue, wrist band, yellow gown, etc. Monitor gait and stability. Monitor for mental status changes and reorient to person, place, and time. Review medications for side effects contributing to fall risk. Reinforce activity limits and safety measures with patient and family. Provide visual clues: red socks.

## 2018-11-21 NOTE — ED PROVIDER NOTE - OBJECTIVE STATEMENT
77 y/o male with h/o metastatic prostate CA, AAA, CAD, GERD, HLD p/w chest pain, shortness of breath and exacerbation of chronic pain.  Pt was recently admitted for chest pain. 77 y/o male with h/o metastatic prostate CA with peritoneal carcinomatosis, AAA, CAD, GERD, HLD p/w chest pain, shortness of breath and exacerbation of chronic pain.  Pt was recently admitted for chest pain.  He received a message on his phone say that "one of his labs is high" although pt doesn't know the lab.  [facility]  He took Percocet for pain 3 hours ago but still has severe pain

## 2018-11-21 NOTE — ED ADULT NURSE NOTE - NSIMPLEMENTINTERV_GEN_ALL_ED
Implemented All Fall Risk Interventions:  [place] to call system. Call bell, personal items and telephone within reach. Instruct patient to call for assistance. Room bathroom lighting operational. Non-slip footwear when patient is off stretcher. Physically safe environment: no spills, clutter or unnecessary equipment. Stretcher in lowest position, wheels locked, appropriate side rails in place. Provide visual cue, wrist band, yellow gown, etc. Monitor gait and stability. Monitor for mental status changes and reorient to person, place, and time. Review medications for side effects contributing to fall risk. Reinforce activity limits and safety measures with patient and family.

## 2018-11-21 NOTE — ED ADULT NURSE NOTE - OBJECTIVE STATEMENT
Pt is a 78 y.o. male c/o pain to lower back and generalized. pt has history of prostate CA and bilateral nephrostomies. pt is A&Ox3, MAEx4 with cane. pt has health aide that visits home per pt. denies falls or injuries. pt is disheveled in appearance, unkept. cachetic. in no apparent respiratory distress. saturating well on room air. vital signs as charted. will continue to monitor.

## 2018-11-21 NOTE — ED ADULT TRIAGE NOTE - NS ED NURSE DIRECT TO ROOM YN
Outpatient Pediatric Speech and Language Evaluation     Date: 10/31/2018  Time In: 12:15 PM  Time Out: 1:00 PM    Patient Name: Raffi Deal  MRN: [id number]  Therapy Diagnosis:   Encounter Diagnosis   Name Primary?    Articulation disorder       Physician: Zee Hooker MD   Medical Diagnosis: speech delay   Age: 6  y.o. 3  m.o.    Visit # 1 out of 20 authorization ending on 12/31/18  Date of Evaluation: 10/31/18   Plan of Care Expiration Date: 4/30/19   Extended POC: N/A    Precautions: Standard     Subjective   History of Current Condition: Raffi is a 6  y.o. 3  m.o. male referred by Zee Hooker MD for a speech-language evaluation secondary to diagnosis of speech delay.  Patients mother was present for todays evaluation and provided significant background and history information.       Raffi came to his speech therapy evaluation today accompanied by his mother.  He participated in his 45 minute speech therapy session addressing his articulation skills with family education included.  He was alert, cooperative, and attentive to therapist and therapy tasks with no prompting required to stay on task. Raffi was easily redirected when he did become off task.  He interacted well with therapist.     Raffi's mother reported that main concerns include pronouncing sounds at the beginning of words.  Mom feels she understands 70-80% of what he says, but it is significantly less for unfamiliar listeners.  Teachers have brought up the issue of not understanding him but have not recommended him for treatment in the school.    Past Medical History: Raffi Deal  has a past medical history of Eczema and Lead exposure.  Raffi Deal  has a past surgical history that includes Circumcision and CIRCUMCISION-PEDIATRIC (N/A, 6/13/2014).  Imaging: No Imaging  Pregnancy/weeks gestation: full term, no complications, fraternal twin  Hospitalizations: none  Ear infections/P.E. tubes: none  Hearing: passed  screening  Developmental Milestones:  WNL other than artic  Previous/Current Therapies: none  Social History: Patient lives at home with mom, dad, grandmother, 2 older siblings and a twin sister.  He is currently attending school at [employer].   Patient does well interacting with other children.    Abuse/Neglect/Environmental Concerns: absent  Current Level of Function: Independent with severe articulation disorder  Pain:  Patient unable to rate pain on a numeric scale.  Pain behaviors were not  observed in todays evaluation.    Nutrition:  No concerns   Patient/ Caregiver Therapy Goals:  For Raffi to be understood by all listeners.    Objective   Language:  Observation and parent report revealed no concerns at this time.     Articulation:  An informal  peripheral oral mechanism examination revealed structure and function to be within functional limits for speech production.    The Mattson-Fristoe Test of Articulation - 2 was administered to assess Raffi's production of speech sounds in single words.  Testing revealed 51 errors with a Standard score of 47, a ranking at the 1 percentile, and an age equivalent of <2 years, 0 months.  This score was in the significantly below average range for Raffi's chronological age level. Errors noted were:    sound Initial Medial Final   p b     m      n      w      h      b   -   g      k g     f p * -   d  - -   ng   -   y      t *  -   sh ch t -   ch dental t -   l w  -   r w w    j dental dental -   voiceless th t p f   v b b -   s D(dental) t -   z j D (dental) -   voiced th d d    bl      br      dr *     fl b     fr p     gl gw     gr gw     kl kw     kr k-     kw      pl pw     sl *     sp -p     st t     sw t     tr tw       Phonemes to be targeted include:p,b,k,f,d,t,s,z.  Age appropriate errors include: sh,ch,th,blends,r,l.    Pragmatics:  Observations and parent report revealed no concerns at this time.    Voice/Resonance:  Observation and parent report  reveal no concerns at this time.    Fluency:  Observation and parent report revealed no concerns at this time.    Swallowing/Dysphagia:  Parent report revealed no concerns at this time.      Treatment   Treatment Time In: n/a  Treatment Time Out: n/a  Total Treatment Time: n/a  n/a    Education:  Raffi's mother was educated on all testing administered as well as what speech therapy is and what it may entail.  She verbalized understanding of all discussed.    Home Program: Discussed reading at night, gesturing toward mouth to correct sounds and modeling correct speech.    Assessment     RAFFI presents to [facility] s/p medical diagnosis of  Speech delay.  Demonstrates impairments including limitations as described in the problem list. The patient was observed to have delays in the following areas:  articulation skills. Raffi would benefit from speech therapy to progress towards the following goals to address the above impairments and functional limitations.  Positive prognostic factors include parent support, patient cooperation, good imitation skills. Negative prognostic factors include none.Barriers to progress include none.  Patient will benefit from skilled, outpatient speech therapy. Raffi's mother stated that she can only do 5:30 appointment times but could be at any location. Therapist discussed wait list for after school times.    Rehab Potential: good  The patient's spiritual, cultural, social, and educational needs were considered with no evidence of barriers noted, and the patient is agreeable to plan of care.     Long Term Objectives: (10/31/18-4/30/19)  Raffi will:  1.  Improve articulation skills closer to age-appropriate levels as measured by formal and/or informal measures.  2.  Caregiver will understand and use strategies independently to facilitate targeted therapy skills and functional communication.     Short Term Objectives: (10/31/18-1/31/19)  Raffi will:  1.  Correctly  produce the /p,k/ phonemes in the initial position of words, phrases, and conversation, with and without a model, with 90% accuracy over 3 consecutive sessions.   2.  Correctly produce final consonants of cvc words, with and without a model, with 90% accuracy over 3 consecutive sessions.   3.  Correctly produce the /f/ phoneme in all positions of words, phrases, and conversation, with and without a model, with 90% accuracy over 3 consecutive sessions.   4.  Correctly produce the /t,d/ phonemes in all positions of words, phrases, and conversation, with and without a model, with 90% accuracy over 3 consecutive sessions.       Plan   Plan of Care Certification: 10/31/2018  to 4/30/19  Recommendations/Referrals:  1.  Speech therapy 1-2x per week for 45-60 minutes for 6 months on an outpatient basis with incorporation of parent education and a home program to facilitate carry-over of learned therapy targets in therapy sessions to the home and daily environment.    2.  Provided contact information for speech-language pathologist at this location.   Caregiver stated that she is only available for appointments at 5:30 in the afternoon.  Patient placed on after school waitlist.   3. Provided handouts on general speech/language milestones for additional information to help facilitate more functional and age-appropriate speech and language skills.                  LILY Dangelo,CCC-SLP   Yes

## 2018-11-21 NOTE — CHART NOTE - NSCHARTNOTEFT_GEN_A_CORE
when I went to interview pt and examine him, he expressed wish to sing out AMA. He is alert, oriented to time, place and person. I have explained him risk of signing AMA which can lead to undiagnosed disease and its complication leads to death. He understood me and verbalized me back.  He signed AMA.     -I have advised him to come to ER if he does not feel good or something dose not look right. I have also advised him to follow up with his pmd for ongoing evaluation and management

## 2018-11-21 NOTE — ED PROVIDER NOTE - OBJECTIVE STATEMENT
79 y/o male with h/o metastatic prostate CA with peritoneal carcinomatosis, AAA, CAD, GERD, HLD presents to the ED c/o CP. Pt was recently seen in [facility] earlier today for the same symptoms, decided to leave AMA. Pt was told to come in this morning due to an abnormal lab finding. Pt was also recently diagnosed with a UTI.

## 2018-11-21 NOTE — ED PROVIDER NOTE - MEDICAL DECISION MAKING DETAILS
Will evaluate patient for cause of weakness to include infectious vs ischemic.  Pt requires pain control beyond what he takes at home and would be a candidate for palliative care.  He states he's DNR/DNI.

## 2018-11-21 NOTE — ED PROVIDER NOTE - PROGRESS NOTE DETAILS
Discussed initial results with patient.  Will get 2nd troponin. Discussed admission for pain control vs discharge and follow up with his oncologist.  Pt states he feels comfortable going home and can manage his pain at home given his 2nd troponin isn't elevated.  It's noted that patient just had a full cardiac workup during the previous admission.  Pt states he wishes to be a DNR/DNI.  Shabbir Cooper, DO Pt to be admitted for treatment of ESBL and palliative care eval.  Shabbir Cooper, DO

## 2018-11-21 NOTE — ED ADULT NURSE NOTE - OBJECTIVE STATEMENT
pt states he has been on treatment for UTI, pt states he was told to come to ER for IV antibiotics for UTI, pt has bilateral nephrostomy, pt states he started to have chest pain today

## 2018-11-22 LAB
ANION GAP SERPL CALC-SCNC: 10 MMOL/L — SIGNIFICANT CHANGE UP (ref 5–17)
BASOPHILS # BLD AUTO: 0.02 K/UL — SIGNIFICANT CHANGE UP (ref 0–0.2)
BASOPHILS NFR BLD AUTO: 0.3 % — SIGNIFICANT CHANGE UP (ref 0–2)
BUN SERPL-MCNC: 23 MG/DL — SIGNIFICANT CHANGE UP (ref 7–23)
CALCIUM SERPL-MCNC: 8 MG/DL — LOW (ref 8.5–10.1)
CHLORIDE SERPL-SCNC: 109 MMOL/L — HIGH (ref 96–108)
CHOLEST SERPL-MCNC: 93 MG/DL — SIGNIFICANT CHANGE UP (ref 10–199)
CO2 SERPL-SCNC: 25 MMOL/L — SIGNIFICANT CHANGE UP (ref 22–31)
CREAT SERPL-MCNC: 0.86 MG/DL — SIGNIFICANT CHANGE UP (ref 0.5–1.3)
EOSINOPHIL # BLD AUTO: 0.03 K/UL — SIGNIFICANT CHANGE UP (ref 0–0.5)
EOSINOPHIL NFR BLD AUTO: 0.4 % — SIGNIFICANT CHANGE UP (ref 0–6)
GLUCOSE SERPL-MCNC: 89 MG/DL — SIGNIFICANT CHANGE UP (ref 70–99)
HCT VFR BLD CALC: 28.9 % — LOW (ref 39–50)
HDLC SERPL-MCNC: 51 MG/DL — SIGNIFICANT CHANGE UP
HGB BLD-MCNC: 8.9 G/DL — LOW (ref 13–17)
IMM GRANULOCYTES NFR BLD AUTO: 1.4 % — SIGNIFICANT CHANGE UP (ref 0–1.5)
LACTATE SERPL-SCNC: 0.7 MMOL/L — SIGNIFICANT CHANGE UP (ref 0.7–2)
LIPID PNL WITH DIRECT LDL SERPL: 27 MG/DL — SIGNIFICANT CHANGE UP
LYMPHOCYTES # BLD AUTO: 0.78 K/UL — LOW (ref 1–3.3)
LYMPHOCYTES # BLD AUTO: 10.2 % — LOW (ref 13–44)
MCHC RBC-ENTMCNC: 26.6 PG — LOW (ref 27–34)
MCHC RBC-ENTMCNC: 30.8 GM/DL — LOW (ref 32–36)
MCV RBC AUTO: 86.3 FL — SIGNIFICANT CHANGE UP (ref 80–100)
MONOCYTES # BLD AUTO: 0.53 K/UL — SIGNIFICANT CHANGE UP (ref 0–0.9)
MONOCYTES NFR BLD AUTO: 6.9 % — SIGNIFICANT CHANGE UP (ref 2–14)
NEUTROPHILS # BLD AUTO: 6.19 K/UL — SIGNIFICANT CHANGE UP (ref 1.8–7.4)
NEUTROPHILS NFR BLD AUTO: 80.8 % — HIGH (ref 43–77)
NRBC # BLD: 0 /100 WBCS — SIGNIFICANT CHANGE UP (ref 0–0)
PLATELET # BLD AUTO: 116 K/UL — LOW (ref 150–400)
POTASSIUM SERPL-MCNC: 3.5 MMOL/L — SIGNIFICANT CHANGE UP (ref 3.5–5.3)
POTASSIUM SERPL-SCNC: 3.5 MMOL/L — SIGNIFICANT CHANGE UP (ref 3.5–5.3)
RBC # BLD: 3.35 M/UL — LOW (ref 4.2–5.8)
RBC # FLD: 17.1 % — HIGH (ref 10.3–14.5)
SODIUM SERPL-SCNC: 144 MMOL/L — SIGNIFICANT CHANGE UP (ref 135–145)
TOTAL CHOLESTEROL/HDL RATIO MEASUREMENT: 1.8 RATIO — LOW (ref 3.4–9.6)
TRIGL SERPL-MCNC: 73 MG/DL — SIGNIFICANT CHANGE UP (ref 10–149)
TROPONIN I SERPL-MCNC: 0.02 NG/ML — SIGNIFICANT CHANGE UP (ref 0.01–0.04)
WBC # BLD: 7.66 K/UL — SIGNIFICANT CHANGE UP (ref 3.8–10.5)
WBC # FLD AUTO: 7.66 K/UL — SIGNIFICANT CHANGE UP (ref 3.8–10.5)

## 2018-11-22 PROCEDURE — 93010 ELECTROCARDIOGRAM REPORT: CPT

## 2018-11-22 RX ORDER — SODIUM CHLORIDE 9 MG/ML
1000 INJECTION INTRAMUSCULAR; INTRAVENOUS; SUBCUTANEOUS
Qty: 0 | Refills: 0 | Status: DISCONTINUED | OUTPATIENT
Start: 2018-11-22 | End: 2018-11-22

## 2018-11-22 RX ORDER — OXYCODONE AND ACETAMINOPHEN 5; 325 MG/1; MG/1
1 TABLET ORAL EVERY 6 HOURS
Qty: 0 | Refills: 0 | Status: DISCONTINUED | OUTPATIENT
Start: 2018-11-22 | End: 2018-11-29

## 2018-11-22 RX ORDER — ACETAMINOPHEN 500 MG
650 TABLET ORAL EVERY 6 HOURS
Qty: 0 | Refills: 0 | Status: DISCONTINUED | OUTPATIENT
Start: 2018-11-22 | End: 2018-11-30

## 2018-11-22 RX ORDER — ERTAPENEM SODIUM 1 G/1
1000 INJECTION, POWDER, LYOPHILIZED, FOR SOLUTION INTRAMUSCULAR; INTRAVENOUS EVERY 24 HOURS
Qty: 0 | Refills: 0 | Status: DISCONTINUED | OUTPATIENT
Start: 2018-11-22 | End: 2018-11-22

## 2018-11-22 RX ORDER — MEROPENEM 1 G/30ML
500 INJECTION INTRAVENOUS EVERY 8 HOURS
Qty: 0 | Refills: 0 | Status: COMPLETED | OUTPATIENT
Start: 2018-11-22 | End: 2018-11-29

## 2018-11-22 RX ORDER — ASPIRIN/CALCIUM CARB/MAGNESIUM 324 MG
81 TABLET ORAL DAILY
Qty: 0 | Refills: 0 | Status: DISCONTINUED | OUTPATIENT
Start: 2018-11-22 | End: 2018-11-30

## 2018-11-22 RX ORDER — METOPROLOL TARTRATE 50 MG
25 TABLET ORAL THREE TIMES A DAY
Qty: 0 | Refills: 0 | Status: DISCONTINUED | OUTPATIENT
Start: 2018-11-22 | End: 2018-11-30

## 2018-11-22 RX ORDER — SUCRALFATE 1 G
1 TABLET ORAL
Qty: 0 | Refills: 0 | Status: DISCONTINUED | OUTPATIENT
Start: 2018-11-22 | End: 2018-11-30

## 2018-11-22 RX ORDER — BICALUTAMIDE 50 MG/1
50 TABLET, FILM COATED ORAL DAILY
Qty: 0 | Refills: 0 | Status: DISCONTINUED | OUTPATIENT
Start: 2018-11-22 | End: 2018-11-30

## 2018-11-22 RX ORDER — SODIUM CHLORIDE 9 MG/ML
1000 INJECTION INTRAMUSCULAR; INTRAVENOUS; SUBCUTANEOUS
Qty: 0 | Refills: 0 | Status: DISCONTINUED | OUTPATIENT
Start: 2018-11-22 | End: 2018-11-30

## 2018-11-22 RX ORDER — PANTOPRAZOLE SODIUM 20 MG/1
40 TABLET, DELAYED RELEASE ORAL
Qty: 0 | Refills: 0 | Status: DISCONTINUED | OUTPATIENT
Start: 2018-11-22 | End: 2018-11-30

## 2018-11-22 RX ORDER — ATORVASTATIN CALCIUM 80 MG/1
80 TABLET, FILM COATED ORAL AT BEDTIME
Qty: 0 | Refills: 0 | Status: DISCONTINUED | OUTPATIENT
Start: 2018-11-22 | End: 2018-11-30

## 2018-11-22 RX ORDER — HEPARIN SODIUM 5000 [USP'U]/ML
5000 INJECTION INTRAVENOUS; SUBCUTANEOUS EVERY 12 HOURS
Qty: 0 | Refills: 0 | Status: DISCONTINUED | OUTPATIENT
Start: 2018-11-22 | End: 2018-11-22

## 2018-11-22 RX ORDER — HEPARIN SODIUM 5000 [USP'U]/ML
5000 INJECTION INTRAVENOUS; SUBCUTANEOUS EVERY 8 HOURS
Qty: 0 | Refills: 0 | Status: DISCONTINUED | OUTPATIENT
Start: 2018-11-22 | End: 2018-11-24

## 2018-11-22 RX ADMIN — OXYCODONE AND ACETAMINOPHEN 1 TABLET(S): 5; 325 TABLET ORAL at 13:59

## 2018-11-22 RX ADMIN — Medication 1 GRAM(S): at 05:52

## 2018-11-22 RX ADMIN — Medication 1 GRAM(S): at 11:52

## 2018-11-22 RX ADMIN — Medication 1 GRAM(S): at 17:06

## 2018-11-22 RX ADMIN — PANTOPRAZOLE SODIUM 40 MILLIGRAM(S): 20 TABLET, DELAYED RELEASE ORAL at 05:53

## 2018-11-22 RX ADMIN — ATORVASTATIN CALCIUM 80 MILLIGRAM(S): 80 TABLET, FILM COATED ORAL at 21:47

## 2018-11-22 RX ADMIN — MEROPENEM 100 MILLIGRAM(S): 1 INJECTION INTRAVENOUS at 13:57

## 2018-11-22 RX ADMIN — Medication 81 MILLIGRAM(S): at 11:51

## 2018-11-22 RX ADMIN — Medication 1 GRAM(S): at 22:30

## 2018-11-22 RX ADMIN — OXYCODONE AND ACETAMINOPHEN 1 TABLET(S): 5; 325 TABLET ORAL at 11:50

## 2018-11-22 RX ADMIN — SODIUM CHLORIDE 100 MILLILITER(S): 9 INJECTION INTRAMUSCULAR; INTRAVENOUS; SUBCUTANEOUS at 04:01

## 2018-11-22 RX ADMIN — OXYCODONE AND ACETAMINOPHEN 1 TABLET(S): 5; 325 TABLET ORAL at 08:11

## 2018-11-22 RX ADMIN — MEROPENEM 100 MILLIGRAM(S): 1 INJECTION INTRAVENOUS at 21:47

## 2018-11-22 RX ADMIN — HEPARIN SODIUM 5000 UNIT(S): 5000 INJECTION INTRAVENOUS; SUBCUTANEOUS at 13:58

## 2018-11-22 RX ADMIN — Medication 1 TABLET(S): at 11:51

## 2018-11-22 RX ADMIN — OXYCODONE AND ACETAMINOPHEN 1 TABLET(S): 5; 325 TABLET ORAL at 20:01

## 2018-11-22 RX ADMIN — Medication 30 MILLILITER(S): at 04:00

## 2018-11-22 NOTE — DIETITIAN INITIAL EVALUATION ADULT. - OTHER INFO
Pt seen for recent prior admission with h/x of Malnutrition. Pt admitted and seen by RR on 11/16/2018. Pt reports no changes since last admission to nutritional status.  Pt noted with h/x of severe protein-calorie malnutrition. Pt's pmhx noted below, sig for CAD, HLD, GERDs, Prostate Ca, Gallstone, neurogenic bladder. Pt current admission for abnormal labs test (ESBL E/coli resistant to PO antibiotics) Pt minimally verbal, very tired and was very breif with RD. Pt continues with poor appetite which has not been good and is still not eating well (unknown period of time). Pt denies chewing or swallowing difficulty no n/v/d/c. reported. Jian 19. skin intact and no edema. Pt has lost a significant amount of weight over the past 5 months. Pt NFPE revealed Severe muscle wasting in temple, clavicle, deltoid, interosseous, calves and quads. Pt with severe fat wasting in triceps, ribs and occular. Pt meets criteria for severe protein-calorie malnutrition in the context of chronic illness. Recommend ensure enlive BID, encourage PO intake, monitor weight. Will continue to monitor pt's nutritional status.

## 2018-11-22 NOTE — DIETITIAN INITIAL EVALUATION ADULT. - PERTINENT LABORATORY DATA
11-22 Na144 mmol/L Glu 89 mg/dL K+ 3.5 mmol/L Cr  0.86 mg/dL BUN 23 mg/dL Phos n/a   Alb n/a   PAB n/a

## 2018-11-22 NOTE — H&P ADULT - ASSESSMENT
77 y/o male with h/o metastatic prostate CA with peritoneal carcinomatosis, AAA, CAD, GERD, HLD presents to the ED with chest pain. Pt was in  ED last night for same symptoms, decided to leave AMA. Cardiac work up was negative. Pt had complete cardiac work up on last admission at  on 11/14. Pt was discharge home on PO antibiotic for recent diagnosis of UTI. Per patient, he received call to come back to ED for abnormal lab finding.    # ESBL UTI  -Admit to medicine  -Start on Meropenem  -ID consult  -Monitor temps  -Serial CBCs    # Chest pain- resolved, likely from GERD  # GERD  ACS ruled out   -Continue with PPI  -Continue with Aspirin/Statin and BB    #Prostate cancer on chemo  -F/u with oncology outpatient 79 y/o male with h/o metastatic prostate CA with peritoneal carcinomatosis, AAA, CAD, GERD, HLD presents to the ED with chest pain. Pt was in  ED last night for same symptoms, decided to leave AMA. Cardiac work up was negative. Pt had complete cardiac work up on last admission at  on 11/14. Pt was discharge home on PO antibiotic for recent diagnosis of UTI. Per patient, he received call to come back to ED for abnormal lab finding.    # ESBL UTI  -Admit to medicine  -Start on Meropenem  -ID consult  -Monitor temps  -Serial CBCs    # Chest pain- resolved, likely from GERD  # GERD  ACS ruled out   -Continue with PPI  -Continue with Aspirin/Statin and BB    #Prostate cancer on chemo  -F/u with oncology outpatient    DVT ppx  Heparin sc q12hrs   IMPROVE VTE Individual Risk Assessment    RISK                                                                Points    [  ] Previous VTE                                                  3    [  ] Thrombophilia                                               2    [  ] Lower limb paralysis                                      2        (unable to hold up >15 seconds)      [  ] Current Cancer                                              2         (within 6 months)    [  ] Immobilization > 24 hrs                                1    [  ] ICU/CCU stay > 24 hours                              1    [  ] Age > 60                                                      1    IMPROVE VTE Score __3_______ 79 y/o male with h/o metastatic prostate CA with peritoneal carcinomatosis, AAA, CAD, GERD, HLD presents to the ED with dull left side chest pain. Pt was in  ED last night for same symptoms, decided to leave AMA. EKG and trops negative. Pt had negative cardiac work up on last admission at  on 11/14. Pt was discharge home on PO antibiotic for recent diagnosis of UTI. Per patient, he received call to come back to ED for abnormal lab finding.    # Chest pain r/o ACS  -Admit to Tele   -Continue with Aspirin/Statin and BB  -Lipid panel   -Cardio consult  -Serial EKGs  -Trend trops     # ESBL UTI  -Continue with Ertapenem   -ID consult  -Monitor temps  -Serial CBCs  -Obtain lactate and blood cultures   -IV hydration     #GERD  -Continue with PPI    #Prostate cancer on chemo  -F/u with oncology outpatient    DVT ppx  Heparin sc q12hrs   IMPROVE VTE Individual Risk Assessment    RISK                                                                Points    [  ] Previous VTE                                                  3    [  ] Thrombophilia                                               2    [  ] Lower limb paralysis                                      2        (unable to hold up >15 seconds)      [  ] Current Cancer                                              2         (within 6 months)    [  ] Immobilization > 24 hrs                                1    [  ] ICU/CCU stay > 24 hours                              1    [  ] Age > 60                                                      1    IMPROVE VTE Score __3_______ 77 y/o male with h/o metastatic prostate CA with peritoneal carcinomatosis, AAA, CAD, GERD, HLD presents to the ED with dull left side chest pain. Pt was in  ED last night for same symptoms, decided to leave AMA. EKG and trops negative. Pt had negative cardiac work up on last admission at  on 11/14. Pt was discharge home on PO antibiotic for recent diagnosis of UTI. Per patient, he received call to come back to ED for abnormal lab finding.    # Chest pain r/o ACS  -Admit to Tele   -Continue with Aspirin/Statin and BB  -Lipid panel   -Cardio consult  -Serial EKGs  -Trend trops     # ESBL UTI  -got IV  Ertapenem   -ID consult  -Monitor temps  -Serial CBCs  -Obtain lactate and blood cultures   -IV hydration     #GERD  -Continue with PPI    #Prostate cancer on chemo  -F/u with oncology outpatient    DVT ppx  Heparin sc q8hrs   IMPROVE VTE Individual Risk Assessment    RISK                                                                Points    [  ] Previous VTE                                                  3    [  ] Thrombophilia                                               2    [  ] Lower limb paralysis                                      2        (unable to hold up >15 seconds)      [  ] Current Cancer                                              2         (within 6 months)    [  ] Immobilization > 24 hrs                                1    [  ] ICU/CCU stay > 24 hours                              1    [  ] Age > 60                                                      1    IMPROVE VTE Score __3_______

## 2018-11-22 NOTE — DIETITIAN INITIAL EVALUATION ADULT. - PERTINENT MEDS FT
MEDICATIONS  (STANDING):  aspirin enteric coated 81 milliGRAM(s) Oral daily  atorvastatin 80 milliGRAM(s) Oral at bedtime  bicalutamide 50 milliGRAM(s) Oral daily  ertapenem  IVPB 1000 milliGRAM(s) IV Intermittent every 24 hours  heparin  Injectable 5000 Unit(s) SubCutaneous every 8 hours  metoprolol tartrate 25 milliGRAM(s) Oral three times a day  multivitamin 1 Tablet(s) Oral daily  pantoprazole    Tablet 40 milliGRAM(s) Oral before breakfast  sodium chloride 0.9%. 1000 milliLiter(s) (100 mL/Hr) IV Continuous <Continuous>  sucralfate 1 Gram(s) Oral four times a day    MEDICATIONS  (PRN):  acetaminophen   Tablet .. 650 milliGRAM(s) Oral every 6 hours PRN Temp greater or equal to 38C (100.4F), Mild Pain (1 - 3)  aluminum hydroxide/magnesium hydroxide/simethicone Suspension 30 milliLiter(s) Oral every 6 hours PRN Dyspepsia  oxyCODONE    5 mG/acetaminophen 325 mG 1 Tablet(s) Oral every 6 hours PRN Severe Pain (7 - 10)

## 2018-11-22 NOTE — CONSULT NOTE ADULT - ASSESSMENT
Chest pain, left sided- palpable tenderness likely from musculoskeletal in nature and from metastatic malignancy.  Peritoneal carcinomatosis and liver mass and lymphadenopathy.  Pain control.  so far cardiac enzymes did not reveal any ischemia.  Discussed with Dr White, hosptialist physician.    Thoracic aortic aneurysm- 4.6cm per recent imaging.  No acute signs of dissection.    Hypertension- continue metoprolol.    hyperlipidemia- continue statin.    Other medical issues- Management per primary team.   Thank you for allowing me to participate in the care of this patient. Please feel free to contact me with any questions.

## 2018-11-22 NOTE — CONSULT NOTE ADULT - SUBJECTIVE AND OBJECTIVE BOX
Patient is a 78y old  Male who presents with a chief complaint of Call back for abnormal lab result (2018 09:38)    HPI:  77 y/o male with h/o metastatic prostate CA with peritoneal carcinomatosis, b/l nephrostomy tubes, AAA, CAD, GERD, HLD was admitted on  for dull left side chest pain. Pt was in  ED the night PTA for same symptoms, decided to leave AMA. Troponin and EKG was negative. Pt had cardiac work up on last admission at  on  after he presented with similar chest pain. At that time urine cultures were collected and he was discharge home on PO antibiotics. Later, urine cultures were reported with ESBL E.coli and the patient was asked to return to the hospital. In ER, he was given ertapenem.      PMH: as above  PSH: as above and cholecystectomy, right cataract, THR, nephrostomy tube  Meds: per reconciliation sheet, noted below  MEDICATIONS  (STANDING):  aspirin enteric coated 81 milliGRAM(s) Oral daily  atorvastatin 80 milliGRAM(s) Oral at bedtime  bicalutamide 50 milliGRAM(s) Oral daily  ertapenem  IVPB 1000 milliGRAM(s) IV Intermittent every 24 hours  heparin  Injectable 5000 Unit(s) SubCutaneous every 8 hours  metoprolol tartrate 25 milliGRAM(s) Oral three times a day  multivitamin 1 Tablet(s) Oral daily  pantoprazole    Tablet 40 milliGRAM(s) Oral before breakfast  sodium chloride 0.9%. 1000 milliLiter(s) (100 mL/Hr) IV Continuous <Continuous>  sucralfate 1 Gram(s) Oral four times a day    MEDICATIONS  (PRN):  acetaminophen   Tablet .. 650 milliGRAM(s) Oral every 6 hours PRN Temp greater or equal to 38C (100.4F), Mild Pain (1 - 3)  aluminum hydroxide/magnesium hydroxide/simethicone Suspension 30 milliLiter(s) Oral every 6 hours PRN Dyspepsia  oxyCODONE    5 mG/acetaminophen 325 mG 1 Tablet(s) Oral every 6 hours PRN Severe Pain (7 - 10)    Allergies    morphine (Vomiting)  sulfa drugs (Other)    Intolerances      Social: no smoking, no alcohol, no illegal drugs; no recent travel, no exposure to TB  FAMILY HISTORY:  No pertinent family history in first degree relatives  Father-stroke; Mother-stroke,  age 80s     no history of premature cardiovascular disease in first degree relatives    ROS: the patient denies fever, no chills, no HA, no seizures, no dizziness, no sore throat, no nasal congestion, no blurry vision, had CP, no palpitations, no SOB, no cough, no abdominal pain, no diarrhea, no N/V, has dysuria, no leg pain, no claudication, no rash, no joint aches, no rectal pain or bleeding, no night sweats  All other systems reviewed and are negative    Vital Signs Last 24 Hrs  T(C): 36.6 (2018 09:57), Max: 36.7 (2018 11:45)  T(F): 97.8 (2018 09:57), Max: 98 (2018 11:45)  HR: 80 (2018 09:57) (67 - 94)  BP: 108/56 (2018 09:57) (85/60 - 119/64)  BP(mean): --  RR: 18 (2018 09:57) (16 - 19)  SpO2: 96% (2018 09:57) (95% - 99%)  Daily Height in cm: 167.64 (2018 18:49)    Daily Weight in k.1 (2018 03:47)    PE:    Constitutional: frail looking  HEENT: NC/AT, EOMI, PERRLA, conjunctivae clear; ears and nose atraumatic; pharynx benign  Neck: supple; thyroid not palpable  Back: no tenderness  Respiratory: respiratory effort normal; decreased BS at bases  Cardiovascular: S1S2 regular, no murmurs  Abdomen: soft, not tender, not distended, positive BS; no liver or spleen organomegaly  Genitourinary: no suprapubic tenderness  Lymphatic: no LN palpable  Musculoskeletal: no muscle tenderness, no joint swelling or tenderness  Extremities: no pedal edema  Neurological/ Psychiatric: AxOx3, judgement and insight normal; moving all extremities  Skin: no rashes; no palpable lesions    Labs: all available labs reviewed                        8.9    7.66  )-----------( 116      ( 2018 06:29 )             28.9     11-22    144  |  109<H>  |  23  ----------------------------<  89  3.5   |  25  |  0.86    Ca    8.0<L>      2018 06:29  Phos  3.3       Mg     2.2         TPro  6.3  /  Alb  2.4<L>  /  TBili  0.6  /  DBili  x   /  AST  53<H>  /  ALT  13  /  AlkPhos  314<H>       LIVER FUNCTIONS - ( 2018 20:30 )  Alb: 2.4 g/dL / Pro: 6.3 gm/dL / ALK PHOS: 314 U/L / ALT: 13 U/L / AST: 53 U/L / GGT: x           Urinalysis Basic - ( 2018 20:30 )    Color: Yellow / Appearance: very cloudy / S.020 / pH: x  Gluc: x / Ketone: Trace  / Bili: Small / Urobili: 1 mg/dL   Blood: x / Protein: 500 mg/dL / Nitrite: Positive   Leuk Esterase: Moderate / RBC: 6-10 /HPF / WBC >50   Sq Epi: x / Non Sq Epi: Occasional / Bacteria: Many      Culture - Urine (collected 2018 04:45)  Source: .Urine None  Preliminary Report (2018 09:24):    >100,000 CFU/ml Escherichia coli    Culture - Urine (collected 2018 22:30)  Source: .Urine Nephrostomy - Left  Final Report (2018 10:56):    >100,000 CFU/ml Escherichia coli ESBL  Organism: Escherichia coli ESBL (2018 10:56)  Organism: Escherichia coli ESBL (2018 10:56)      -  Amikacin: S <=8      -  Amoxicillin/Clavulanic Acid: I 16/8      -  Ampicillin: R >16 These ampicillin results predict results for amoxicillin      -  Ampicillin/Sulbactam: R 16/8      -  Aztreonam: R 16      -  Cefazolin: R >16 For uncomplicated UTI with K. pneumoniae, E. coli, or P. mirablis: JERAMY <=16 is sensitive and JERAMY >=32 is resistant. This also predicts results for oral agents cefaclor, cefdinir, cefpodoxime, cefprozil, cefuroxime axetil, cephalexin and locarbef for uncomplicated UTI. Note that some isolates may be susceptible to these agents while testing resistant to cefazolin.      -  Cefepime: R 4      -  Cefoxitin: S <=4      -  Ceftriaxone: R >32 Enterobacter, Citrobacter, and Serratia may develop resistance during prolonged therapy      -  Ciprofloxacin: R >2      -  Ertapenem: S <=0.5      -  Gentamicin: S <=1      -  Imipenem: S <=1      -  Levofloxacin: R >4      -  Meropenem: S <=1      -  Nitrofurantoin: S <=32 Should not be used to treat pyelonephritis      -  Piperacillin/Tazobactam: R <=8      -  Tigecycline: S <=1      -  Tobramycin: R >8      -  Trimethoprim/Sulfamethoxazole: R >      Method Type: JERAMY        Radiology: all available radiological tests reviewed    Advanced directives addressed: full resuscitation Patient is a 78y old  Male who presents with a chief complaint of Call back for abnormal lab result (2018 09:38)    HPI:  79 y/o male with h/o metastatic prostate CA with peritoneal carcinomatosis, b/l nephrostomy tubes, AAA, CAD, GERD, HLD was admitted on  for dull left side chest pain. Pt was in  ED the night PTA for same symptoms, decided to leave AMA. Troponin and EKG was negative. Pt had cardiac work up on last admission at  on  after he presented with similar chest pain. At that time urine cultures were collected and he was discharge home on PO antibiotics. Later, urine cultures were reported with ESBL E.coli and the patient was asked to return to the hospital. In ER, he was given ertapenem.      PMH: as above  PSH: as above and cholecystectomy, right cataract, THR, nephrostomy tube  Meds: per reconciliation sheet, noted below  MEDICATIONS  (STANDING):  aspirin enteric coated 81 milliGRAM(s) Oral daily  atorvastatin 80 milliGRAM(s) Oral at bedtime  bicalutamide 50 milliGRAM(s) Oral daily  ertapenem  IVPB 1000 milliGRAM(s) IV Intermittent every 24 hours  heparin  Injectable 5000 Unit(s) SubCutaneous every 8 hours  metoprolol tartrate 25 milliGRAM(s) Oral three times a day  multivitamin 1 Tablet(s) Oral daily  pantoprazole    Tablet 40 milliGRAM(s) Oral before breakfast  sodium chloride 0.9%. 1000 milliLiter(s) (100 mL/Hr) IV Continuous <Continuous>  sucralfate 1 Gram(s) Oral four times a day    MEDICATIONS  (PRN):  acetaminophen   Tablet .. 650 milliGRAM(s) Oral every 6 hours PRN Temp greater or equal to 38C (100.4F), Mild Pain (1 - 3)  aluminum hydroxide/magnesium hydroxide/simethicone Suspension 30 milliLiter(s) Oral every 6 hours PRN Dyspepsia  oxyCODONE    5 mG/acetaminophen 325 mG 1 Tablet(s) Oral every 6 hours PRN Severe Pain (7 - 10)    Allergies    morphine (Vomiting)  sulfa drugs (Other)    Intolerances      Social: no smoking, no alcohol, no illegal drugs; no recent travel, no exposure to TB  FAMILY HISTORY:  No pertinent family history in first degree relatives  Father-stroke; Mother-stroke,  age 80s     no history of premature cardiovascular disease in first degree relatives    ROS: the patient denies fever, no chills, no HA, no seizures, no dizziness, no sore throat, no nasal congestion, no blurry vision, had CP, no palpitations, no SOB, no cough, no abdominal pain, no diarrhea, no N/V, has dysuria, no leg pain, no claudication, no rash, no joint aches, no rectal pain or bleeding, no night sweats  All other systems reviewed and are negative    Vital Signs Last 24 Hrs  T(C): 36.6 (2018 09:57), Max: 36.7 (2018 11:45)  T(F): 97.8 (2018 09:57), Max: 98 (2018 11:45)  HR: 80 (2018 09:57) (67 - 94)  BP: 108/56 (2018 09:57) (85/60 - 119/64)  BP(mean): --  RR: 18 (2018 09:57) (16 - 19)  SpO2: 96% (2018 09:57) (95% - 99%)  Daily Height in cm: 167.64 (2018 18:49)    Daily Weight in k.1 (2018 03:47)    PE:    Constitutional: frail looking  HEENT: NC/AT, EOMI, PERRLA, conjunctivae clear; ears and nose atraumatic; pharynx benign  Neck: supple; thyroid not palpable  Back: no tenderness  Respiratory: respiratory effort normal; decreased BS at bases  Cardiovascular: S1S2 regular, no murmurs  Abdomen: soft, not tender, not distended, positive BS; no liver or spleen organomegaly  Genitourinary: no suprapubic tenderness; b/l tubes  Lymphatic: no LN palpable  Musculoskeletal: no muscle tenderness, no joint swelling or tenderness  Extremities: no pedal edema  Neurological/ Psychiatric: AxOx3, judgement and insight normal; moving all extremities  Skin: no rashes; no palpable lesions    Labs: all available labs reviewed                        8.9    7.66  )-----------( 116      ( 2018 06:29 )             28.9     11-22    144  |  109<H>  |  23  ----------------------------<  89  3.5   |  25  |  0.86    Ca    8.0<L>      2018 06:29  Phos  3.3       Mg     2.2         TPro  6.3  /  Alb  2.4<L>  /  TBili  0.6  /  DBili  x   /  AST  53<H>  /  ALT  13  /  AlkPhos  314<H>       LIVER FUNCTIONS - ( 2018 20:30 )  Alb: 2.4 g/dL / Pro: 6.3 gm/dL / ALK PHOS: 314 U/L / ALT: 13 U/L / AST: 53 U/L / GGT: x           Urinalysis Basic - ( 2018 20:30 )    Color: Yellow / Appearance: very cloudy / S.020 / pH: x  Gluc: x / Ketone: Trace  / Bili: Small / Urobili: 1 mg/dL   Blood: x / Protein: 500 mg/dL / Nitrite: Positive   Leuk Esterase: Moderate / RBC: 6-10 /HPF / WBC >50   Sq Epi: x / Non Sq Epi: Occasional / Bacteria: Many      Culture - Urine (collected 2018 04:45)  Source: .Urine None  Preliminary Report (2018 09:24):    >100,000 CFU/ml Escherichia coli    Culture - Urine (collected 2018 22:30)  Source: .Urine Nephrostomy - Left  Final Report (2018 10:56):    >100,000 CFU/ml Escherichia coli ESBL  Organism: Escherichia coli ESBL (2018 10:56)  Organism: Escherichia coli ESBL (2018 10:56)      -  Amikacin: S <=8      -  Amoxicillin/Clavulanic Acid: I 16/8      -  Ampicillin: R >16 These ampicillin results predict results for amoxicillin      -  Ampicillin/Sulbactam: R 16/8      -  Aztreonam: R 16      -  Cefazolin: R >16 For uncomplicated UTI with K. pneumoniae, E. coli, or P. mirablis: JERAMY <=16 is sensitive and JERAMY >=32 is resistant. This also predicts results for oral agents cefaclor, cefdinir, cefpodoxime, cefprozil, cefuroxime axetil, cephalexin and locarbef for uncomplicated UTI. Note that some isolates may be susceptible to these agents while testing resistant to cefazolin.      -  Cefepime: R 4      -  Cefoxitin: S <=4      -  Ceftriaxone: R >32 Enterobacter, Citrobacter, and Serratia may develop resistance during prolonged therapy      -  Ciprofloxacin: R >2      -  Ertapenem: S <=0.5      -  Gentamicin: S <=1      -  Imipenem: S <=1      -  Levofloxacin: R >4      -  Meropenem: S <=1      -  Nitrofurantoin: S <=32 Should not be used to treat pyelonephritis      -  Piperacillin/Tazobactam: R <=8      -  Tigecycline: S <=1      -  Tobramycin: R >8      -  Trimethoprim/Sulfamethoxazole: R >/      Method Type: JERAMY        Radiology: all available radiological tests reviewed    Advanced directives addressed: full resuscitation

## 2018-11-22 NOTE — PROGRESS NOTE ADULT - SUBJECTIVE AND OBJECTIVE BOX
HOSPITALIST ATTENDING PROGRESS NOTE    Chart and meds reviewed.  Patient seen and examined.    HPI: 79 y/o male with h/o metastatic prostate CA with peritoneal carcinomatosis, AAA, CAD, GERD, HLD presents to the ED with dull left side chest pain. Pt was in  ED last night for same symptoms, decided to leave AMA. Troponin and EKG was negative. Pt had negative cardiac work up on last admission at  on  after he presented with similar chest pain. Pt was discharge home on PO antibiotic for recent diagnosis of UTI. Per patient, he received call to come back to ED for abnormal lab finding. CP resolved. No SOB or wheezing. Denies fever, chills, abd pain, N/V/D.   Urine culture from 18 showed ESBL E/coli resistant to PO antibiotics.   UA done in ED grossly positive     18 pt seen and examined, says his chest pain has resolved. Has a hx of prostate CA with peritoneal carcinomatosis. discussed case with  who previously called him for positive urine cx. Discussed with ID who will see the patient.    All 10 systems reviewed and found to be negative with the exception of what has been described above.    MEDICATIONS  (STANDING):  aspirin enteric coated 81 milliGRAM(s) Oral daily  atorvastatin 80 milliGRAM(s) Oral at bedtime  bicalutamide 50 milliGRAM(s) Oral daily  ertapenem  IVPB 1000 milliGRAM(s) IV Intermittent every 24 hours  heparin  Injectable 5000 Unit(s) SubCutaneous every 8 hours  metoprolol tartrate 25 milliGRAM(s) Oral three times a day  multivitamin 1 Tablet(s) Oral daily  pantoprazole    Tablet 40 milliGRAM(s) Oral before breakfast  sodium chloride 0.9%. 1000 milliLiter(s) (100 mL/Hr) IV Continuous <Continuous>  sucralfate 1 Gram(s) Oral four times a day    MEDICATIONS  (PRN):  acetaminophen   Tablet .. 650 milliGRAM(s) Oral every 6 hours PRN Temp greater or equal to 38C (100.4F), Mild Pain (1 - 3)  aluminum hydroxide/magnesium hydroxide/simethicone Suspension 30 milliLiter(s) Oral every 6 hours PRN Dyspepsia  oxyCODONE    5 mG/acetaminophen 325 mG 1 Tablet(s) Oral every 6 hours PRN Severe Pain (7 - 10)      VITALS:  T(F): 97.8 (18 @ 04:55), Max: 98 (18 @ 11:45)  HR: 73 (18 @ 04:55) (67 - 94)  BP: 106/65 (18 @ 04:55) (85/60 - 119/64)  RR: 19 (18 @ 04:55) (16 - 19)  SpO2: 95% (18 @ 04:55) (95% - 99%)  Wt(kg): --    I&O's Summary    2018 07:01  -  2018 07:00  --------------------------------------------------------  IN: 0 mL / OUT: 175 mL / NET: -175 mL        CAPILLARY BLOOD GLUCOSE          PHYSICAL EXAM:    HEENT:  pupils equal and reactive, EOMI, no oropharyngeal lesions, erythema, exudates, oral thrush    NECK:   supple, no carotid bruits, no palpable lymph nodes, no thyromegaly    CV:  +S1, +S2, regular, no murmurs or rubs    RESP:   lungs clear to auscultation bilaterally, no wheezing, rales, rhonchi, good air entry bilaterally    BREAST:  not examined    GI:  abdomen soft, non-tender, non-distended, normal BS, no bruits, no abdominal masses, no palpable masses    RECTAL:  not examined    :  not examined    MSK:   normal muscle tone, no atrophy, no rigidity, no contractions    EXT:   no clubbing, no cyanosis, no edema, no calf pain, swelling or erythema    VASCULAR:  pulses equal and symmetric in the upper and lower extremities    NEURO:  AAOX3, no focal neurological deficits, follows all commands, able to move extremities spontaneously    SKIN:  no ulcers, lesions or rashes    LABS:                            8.9    7.66  )-----------( 116      ( 2018 06:29 )             28.9         144  |  109<H>  |  23  ----------------------------<  89  3.5   |  25  |  0.86    Ca    8.0<L>      2018 06:29  Phos  3.3       Mg     2.2         TPro  6.3  /  Alb  2.4<L>  /  TBili  0.6  /  DBili  x   /  AST  53<H>  /  ALT  13  /  AlkPhos  314<H>      CARDIAC MARKERS ( 2018 06:29 )  0.016 ng/mL / x     / x     / x     / x      CARDIAC MARKERS ( 2018 20:30 )  <0.015 ng/mL / x     / x     / x     / x      CARDIAC MARKERS ( 2018 06:54 )  0.019 ng/mL / x     / x     / x     / x      CARDIAC MARKERS ( 2018 04:45 )  0.022 ng/mL / x     / x     / x     / x          LIVER FUNCTIONS - ( 2018 20:30 )  Alb: 2.4 g/dL / Pro: 6.3 gm/dL / ALK PHOS: 314 U/L / ALT: 13 U/L / AST: 53 U/L / GGT: x           PT/INR - ( 2018 20:30 )   PT: 17.7 sec;   INR: 1.57 ratio         PTT - ( 2018 20:30 )  PTT:32.7 sec  Urinalysis Basic - ( 2018 20:30 )    Color: Yellow / Appearance: very cloudy / S.020 / pH: x  Gluc: x / Ketone: Trace  / Bili: Small / Urobili: 1 mg/dL   Blood: x / Protein: 500 mg/dL / Nitrite: Positive   Leuk Esterase: Moderate / RBC: 6-10 /HPF / WBC >50   Sq Epi: x / Non Sq Epi: Occasional / Bacteria: Many        Lactate, Blood: 0.7 mmol/L ( @ 06:29)    Blood, Urine: Large ( @ 20:30)                            CULTURES:  Blood, Urine: Large ( @ 20:30)

## 2018-11-22 NOTE — H&P ADULT - HISTORY OF PRESENT ILLNESS
77 y/o male with h/o metastatic prostate CA with peritoneal carcinomatosis, AAA, CAD, GERD, HLD presents to the ED with chest pain. Pt was in  ED last night for same symptoms, decided to leave AMA. Cardiac work up was negative. Pt had complete cardiac work up on last admission at  on . Pt was discharge home on PO antibiotic for recent diagnosis of UTI. Per patient, he received call to come back to ED for abnormal lab finding. CP resolved. No SOB or wheezing. Denies fever, chills, abd pain, N/V/D.   Urine culture from 18 showed ESBL E/coli resistant to PO antibiotics.   UA done in ED grossly positive     PSH: as above and cholecystectomy, right cataract, THR, nephrostomy tube  Family Hx: Father-stroke; Mother-stroke,  age 80s 77 y/o male with h/o metastatic prostate CA with peritoneal carcinomatosis, AAA, CAD, GERD, HLD presents to the ED with dull left side chest pain. Pt was in  ED last night for same symptoms, decided to leave AMA. Troponin and EKG was negative. Pt had negative cardiac work up on last admission at  on  after he presented with similar chest pain. Pt was discharge home on PO antibiotic for recent diagnosis of UTI. Per patient, he received call to come back to ED for abnormal lab finding. CP resolved. No SOB or wheezing. Denies fever, chills, abd pain, N/V/D.   Urine culture from 18 showed ESBL E/coli resistant to PO antibiotics.   UA done in ED grossly positive     PSH: as above and cholecystectomy, right cataract, THR, nephrostomy tube  Family Hx: Father-stroke; Mother-stroke,  age 80s

## 2018-11-22 NOTE — CONSULT NOTE ADULT - SUBJECTIVE AND OBJECTIVE BOX
Patient is a 78y old  Male who presents with a chief complaint of chest pain.   HPI:  79 y/o male with h/o metastatic prostate CA with peritoneal carcinomatosis, AAA, CAD, GERD, HLD presents to the ED with dull left side chest pain. Pt was in  ED last night for same symptoms, decided to leave AMA. Troponin and EKG was negative. Pt had negative cardiac work up on last admission at  on  after he presented with similar chest pain. Pt was discharge home on PO antibiotic for recent diagnosis of UTI. Per patient, he received call to come back to ED for abnormal lab finding.  No SOB or wheezing. Denies fever, chills, abd pain, N/V/D.   Urine culture from 18 showed ESBL E/coli resistant to PO antibiotics.   UA done in ED grossly positive     Pt this am still c/o left sided chest pain that is constant in nature and is unchanged from before.  he denies any SOB.     PSH: as above and cholecystectomy, right cataract, THR, nephrostomy tube  Family Hx: Father-stroke; Mother-stroke,  age 80s    PAST MEDICAL & SURGICAL HISTORY:  CAD (coronary artery disease)  HLD (hyperlipidemia)  GERD (gastroesophageal reflux disease)  Colon obstruction  Prostate CA  Gallstones  Neurogenic bladder  Carotid stenosis: right and left  AAA (abdominal aortic aneurysm):   Left rib fracture  S/P cataract surgery, right  H/O hemicolectomy  History of intestinal surgery  History of cholecystectomy  THR (Total Hip Replacement)      MEDICATIONS  (STANDING):  aspirin enteric coated 81 milliGRAM(s) Oral daily  atorvastatin 80 milliGRAM(s) Oral at bedtime  bicalutamide 50 milliGRAM(s) Oral daily  ertapenem  IVPB 1000 milliGRAM(s) IV Intermittent every 24 hours  heparin  Injectable 5000 Unit(s) SubCutaneous every 8 hours  metoprolol tartrate 25 milliGRAM(s) Oral three times a day  multivitamin 1 Tablet(s) Oral daily  pantoprazole    Tablet 40 milliGRAM(s) Oral before breakfast  sodium chloride 0.9%. 1000 milliLiter(s) (100 mL/Hr) IV Continuous <Continuous>  sucralfate 1 Gram(s) Oral four times a day    MEDICATIONS  (PRN):  acetaminophen   Tablet .. 650 milliGRAM(s) Oral every 6 hours PRN Temp greater or equal to 38C (100.4F), Mild Pain (1 - 3)  aluminum hydroxide/magnesium hydroxide/simethicone Suspension 30 milliLiter(s) Oral every 6 hours PRN Dyspepsia  oxyCODONE    5 mG/acetaminophen 325 mG 1 Tablet(s) Oral every 6 hours PRN Severe Pain (7 - 10)      FAMILY HISTORY:  No pertinent family history in first degree relatives      SOCIAL HISTORY:  Cocaine use    REVIEW OF SYSTEMS:  CONSTITUTIONAL:    No fatigue, malaise, lethargy.  No fever or chills.  HEENT:  Eyes:  No visual changes.     ENT:  No epistaxis.  No sinus pain.    RESPIRATORY:  No cough.  No wheeze.  No hemoptysis.  No shortness of breath.  CARDIOVASCULAR:  c/o chest pains.  No palpitations. No shortness of breath, No orthopnea or PND.  GASTROINTESTINAL:  c/o abdominal pain.  No nausea or vomiting.    GENITOURINARY:    No hematuria.    MUSCULOSKELETAL:  No musculoskeletal pain.  No joint swelling.  No arthritis.  NEUROLOGICAL:  No tingling or numbness or weakness.  PSYCHIATRIC:  No confusion  SKIN:  No rashes.    ENDOCRINE:  No unexplained weight loss.  No polydipsia.   HEMATOLOGIC:  No anemia.  No prolonged or excessive bleeding.   ALLERGIC AND IMMUNOLOGIC:  No pruritus.          Vital Signs Last 24 Hrs  T(C): 36.6 (2018 04:55), Max: 36.7 (2018 11:45)  T(F): 97.8 (2018 04:55), Max: 98 (2018 11:45)  HR: 73 (2018 04:55) (67 - 94)  BP: 106/65 (2018 04:55) (85/60 - 119/64)  BP(mean): --  RR: 19 (2018 04:55) (16 - 19)  SpO2: 95% (2018 04:55) (95% - 99%)    PHYSICAL EXAM-    Constitutional: ill looking, frail, elderly male in no acute distress.     Head: Head is normocephalic and atraumatic.      Neck: No JVD.     Cardiovascular: Regular rate and rhythm without S3, S4. No murmurs or rubs are appreciated.    Chest wall tenderness over the lower ribs on the left side.     Respiratory: Breath sounds are normal. No rales. No wheezing.    Abdomen: Soft, tender, no rigidity or guarding, nondistended with positive bowel sounds.      Extremity: No tenderness. No  pitting edema     Neurologic: The patient is alert and oriented.      Skin: No rash, no obvious lesions noted.      Psychiatric: The patient appears to be emotionally stable.      INTERPRETATION OF TELEMETRY: sinus rythm     ECG: Sinus rythm , RBBB, T wave inversion in V2- 6.  These changes are similar to his EKG from last admission.     I&O's Detail    2018 07:01  -  2018 07:00  --------------------------------------------------------  IN:  Total IN: 0 mL    OUT:    Nephrostomy Tube: 75 mL    Nephrostomy Tube: 100 mL  Total OUT: 175 mL    Total NET: -175 mL          LABS:                        8.9    7.66  )-----------( 116      ( 2018 06:29 )             28.9     11-    144  |  109<H>  |  23  ----------------------------<  89  3.5   |  25  |  0.86    Ca    8.0<L>      2018 06:29  Phos  3.3     11-  Mg     2.2     11-    TPro  6.3  /  Alb  2.4<L>  /  TBili  0.6  /  DBili  x   /  AST  53<H>  /  ALT  13  /  AlkPhos  314<H>  11-21    CARDIAC MARKERS ( 2018 06:29 )  0.016 ng/mL / x     / x     / x     / x      CARDIAC MARKERS ( 2018 20:30 )  <0.015 ng/mL / x     / x     / x     / x      CARDIAC MARKERS ( 2018 06:54 )  0.019 ng/mL / x     / x     / x     / x      CARDIAC MARKERS ( 2018 04:45 )  0.022 ng/mL / x     / x     / x     / x          PT/INR - ( 2018 20:30 )   PT: 17.7 sec;   INR: 1.57 ratio         PTT - ( 2018 20:30 )  PTT:32.7 sec  Urinalysis Basic - ( 2018 20:30 )    Color: Yellow / Appearance: very cloudy / S.020 / pH: x  Gluc: x / Ketone: Trace  / Bili: Small / Urobili: 1 mg/dL   Blood: x / Protein: 500 mg/dL / Nitrite: Positive   Leuk Esterase: Moderate / RBC: 6-10 /HPF / WBC >50   Sq Epi: x / Non Sq Epi: Occasional / Bacteria: Many      I&O's Summary    2018 07:01  -  2018 07:00  --------------------------------------------------------  IN: 0 mL / OUT: 175 mL / NET: -175 mL      BNP  RADIOLOGY & ADDITIONAL STUDIES:  < from: Xray Chest 1 View-PORTABLE IMMEDIATE (18 @ 07:05) >    EXAM:  XR CHEST PORTABLE IMMED 1V                            PROCEDURE DATE:  2018          INTERPRETATION:  CLINICAL INDICATION:  chest pain    TECHNIQUE: Single view AP chest radiograph was performed.    COMPARISON:  Chest radiograph dated 2018 and 2018.    FINDINGS:    The left costophrenic angle is not included in the study decreasing   sensitivity for full evaluation. Patient is rotated to the left.    There is a chronic left retrocardiac opacity likely represent   vasculature. There is no evidence for pneumothorax, consolidation or   vascular congestion. There is no right-sided pleural effusion.    The cardiac silhouette size is within normal limits. Aortic arch   calcifications.    Redemonstration of chronic distal 1/3 left clavicular fracture deformity.    IMPRESSION:    No acute cardiopulmonary pathology the visualized lungs.                  JEREMY PEARSON M.D., ATTENDING RADIOLOGIST  This document has been electronically signed. 2018 10:16AM        < end of copied text >

## 2018-11-22 NOTE — H&P ADULT - NSHPPHYSICALEXAM_GEN_ALL_CORE
Vital Signs Last 24 Hrs  T(C): 36.6 (21 Nov 2018 19:06), Max: 36.7 (21 Nov 2018 07:35)  T(F): 97.9 (21 Nov 2018 19:06), Max: 98 (21 Nov 2018 07:35)  HR: 74 (21 Nov 2018 22:30) (67 - 91)  BP: 112/69 (21 Nov 2018 22:30) (85/60 - 112/69)  BP(mean): 69 (21 Nov 2018 05:02) (69 - 69)  RR: 17 (21 Nov 2018 22:30) (12 - 19)  SpO2: 97% (21 Nov 2018 22:30) (94% - 100%)

## 2018-11-22 NOTE — CHART NOTE - NSCHARTNOTEFT_GEN_A_CORE
Upon Nutritional Assessment by the Registered Dietitian your patient was determined to meet criteria / has evidence of the following diagnosis/diagnoses:          [ ]  Mild Protein Calorie Malnutrition        [ ]  Moderate Protein Calorie Malnutrition        [x] Severe Protein Calorie Malnutrition        [ ] Unspecified Protein Calorie Malnutrition        [ ] Underweight / BMI <19        [ ] Morbid Obesity / BMI > 40      Findings as based on:  •  Comprehensive nutrition assessment and consultation  •  Calorie counts (nutrient intake analysis)  •  Food acceptance and intake status from observations by staff  •  Follow up  •  Patient education  •  Intervention secondary to interdisciplinary rounds  •   concerns      Treatment:    The following diet has been recommended:  -Encourage PO intake  -Ensure enlive BID    PROVIDER Section:     By signing this assessment you are acknowledging and agree with the diagnosis/diagnoses assigned by the Registered Dietitian    Comments:

## 2018-11-22 NOTE — CONSULT NOTE ADULT - ASSESSMENT
77 y/o male with h/o metastatic prostate CA with peritoneal carcinomatosis, b/l nephrostomy tubes, AAA, CAD, GERD, HLD was admitted on 11/21 for dull left side chest pain. Pt was in  ED the night PTA for same symptoms, decided to leave AMA. Troponin and EKG was negative. Pt had cardiac work up on last admission at  on 11/14 after he presented with similar chest pain. At that time urine cultures were collected and he was discharge home on PO antibiotics. Later, urine cultures were reported with ESBL E.coli and the patient was asked to return to the hospital. In ER, he was given meropenem.    1. UTI with ESBL E.coli. B/l nephrostomy tubes. Metastatic prostate Ca.  -obtain BC x 2 and urine c/s  -start meropenem 500 mg IV q8h  -reason for abx use and side effects reviewed with patient; monitor BMP   -old chart reviewed to assess prior cultures  -monitor temps  -f/u CBC  -supportive care  2. Other issues:   -care per medicine 77 y/o male with h/o metastatic prostate CA with peritoneal carcinomatosis, b/l nephrostomy tubes, AAA, CAD, GERD, HLD was admitted on 11/21 for dull left side chest pain. Pt was in  ED the night PTA for same symptoms, decided to leave AMA. Troponin and EKG was negative. Pt had cardiac work up on last admission at  on 11/14 after he presented with similar chest pain. At that time urine cultures were collected and he was discharge home on PO antibiotics. Later, urine cultures were reported with ESBL E.coli and the patient was asked to return to the hospital. In ER, he was given meropenem.    1. UTI with ESBL E.coli. B/l nephrostomy tubes. Metastatic prostate Ca.  -probable underlying urinary obstruction  -obtain BC x 2 and urine c/s  -start meropenem 500 mg IV q8h  -reason for abx use and side effects reviewed with patient; monitor BMP   -old chart reviewed to assess prior cultures  -monitor temps  -f/u CBC  -supportive care  2. Other issues:   -care per medicine

## 2018-11-22 NOTE — H&P ADULT - ATTENDING COMMENTS
79 y/o male with h/o metastatic prostate CA with peritoneal carcinomatosis, AAA, CAD, GERD, HLD presents to the ED with dull left side chest pain. Pt was in  ED last night for same symptoms, decided to leave AMA. Troponin and EKG was negative. Pt had negative cardiac work up on last admission at  on 11/14 after he presented with similar chest pain. Pt was discharge home on PO antibiotic for recent diagnosis of UTI. Per patient, he received call to come back to ED for abnormal lab finding. In the ED it was confirmed that he got the call for recent urine culture report.   Urine culture from 11/16/18 showed ESBL E/coli resistant to PO antibiotics. UA done in ED grossly positive. Got 1 dose of IV Ertapenem.   CP resolved. No SOB or wheezing. Denies fever, chills, abd pain, N/V/D.   ROS: as above.  On exam: as above.  A/P:    # Chest pain r/o ACS  -Admit to Tele   -Continue with Aspirin/Statin and BB  -Lipid panel   -follow Cardio consult  -Serial EKGs  -Trend trops     # ESBL UTI  -Continue with Ertapenem   -follow ID consult  -Monitor temps  -Serial CBCs  -IV hydration     #GERD  -Continue with PPI    #Prostate cancer on chemo  -F/u with oncology outpatient    DVT ppx  Heparin sc q8 hrs

## 2018-11-22 NOTE — PROGRESS NOTE ADULT - ASSESSMENT
79 y/o male with h/o metastatic prostate CA with peritoneal carcinomatosis, AAA, CAD, GERD, HLD presents to the ED with dull left side chest pain. Pt was in  ED last night for same symptoms, decided to leave AMA. Troponin and EKG was negative. Pt had negative cardiac work up on last admission at  on 11/14 after he presented with similar chest pain. Pt was discharge home on PO antibiotic for recent diagnosis of UTI. Per patient, he received call to come back to ED for abnormal lab finding. In the ED it was confirmed that he got the call for recent urine culture report.   Urine culture from 11/16/18 showed ESBL E/coli resistant to PO antibiotics. UA done in ED grossly positive. Got 1 dose of IV Ertapenem.   CP resolved. No SOB or wheezing. Denies fever, chills, abd pain, N/V/D.       # Chest pain r/o ACS: likely musculoskeletal, appreciate cardio input  -Continue with Aspirin/Statin and BB    # ESBL UTI  -Continue with Ertapenem   -follow ID consult  - [facility] urologist is at the [facility] and does not know the name    #GERD  -Continue with PPI    #Prostate cancer on chemo  -F/u with oncology outpatient    # B/L Nephrostomy tube  - plan per [facility] at [facility]    DVT ppx  Heparin sc q8 hrs .

## 2018-11-23 LAB
-  AMIKACIN: SIGNIFICANT CHANGE UP
-  AMOXICILLIN/CLAVULANIC ACID: SIGNIFICANT CHANGE UP
-  AMPICILLIN/SULBACTAM: SIGNIFICANT CHANGE UP
-  AMPICILLIN: SIGNIFICANT CHANGE UP
-  AMPICILLIN: SIGNIFICANT CHANGE UP
-  AZTREONAM: SIGNIFICANT CHANGE UP
-  CEFAZOLIN: SIGNIFICANT CHANGE UP
-  CEFEPIME: SIGNIFICANT CHANGE UP
-  CEFOXITIN: SIGNIFICANT CHANGE UP
-  CEFTRIAXONE: SIGNIFICANT CHANGE UP
-  CIPROFLOXACIN: SIGNIFICANT CHANGE UP
-  CIPROFLOXACIN: SIGNIFICANT CHANGE UP
-  DAPTOMYCIN: SIGNIFICANT CHANGE UP
-  ERTAPENEM: SIGNIFICANT CHANGE UP
-  GENTAMICIN: SIGNIFICANT CHANGE UP
-  IMIPENEM: SIGNIFICANT CHANGE UP
-  LEVOFLOXACIN: SIGNIFICANT CHANGE UP
-  LEVOFLOXACIN: SIGNIFICANT CHANGE UP
-  LINEZOLID: SIGNIFICANT CHANGE UP
-  MEROPENEM: SIGNIFICANT CHANGE UP
-  NITROFURANTOIN: SIGNIFICANT CHANGE UP
-  NITROFURANTOIN: SIGNIFICANT CHANGE UP
-  PIPERACILLIN/TAZOBACTAM: SIGNIFICANT CHANGE UP
-  TETRACYCLINE: SIGNIFICANT CHANGE UP
-  TIGECYCLINE: SIGNIFICANT CHANGE UP
-  TOBRAMYCIN: SIGNIFICANT CHANGE UP
-  TRIMETHOPRIM/SULFAMETHOXAZOLE: SIGNIFICANT CHANGE UP
-  VANCOMYCIN: SIGNIFICANT CHANGE UP
ALBUMIN SERPL ELPH-MCNC: 2.1 G/DL — LOW (ref 3.3–5)
ALP SERPL-CCNC: 290 U/L — HIGH (ref 40–120)
ALT FLD-CCNC: 9 U/L — LOW (ref 12–78)
ANION GAP SERPL CALC-SCNC: 9 MMOL/L — SIGNIFICANT CHANGE UP (ref 5–17)
AST SERPL-CCNC: 33 U/L — SIGNIFICANT CHANGE UP (ref 15–37)
BILIRUB SERPL-MCNC: 0.3 MG/DL — SIGNIFICANT CHANGE UP (ref 0.2–1.2)
BUN SERPL-MCNC: 19 MG/DL — SIGNIFICANT CHANGE UP (ref 7–23)
CALCIUM SERPL-MCNC: 8.1 MG/DL — LOW (ref 8.5–10.1)
CHLORIDE SERPL-SCNC: 109 MMOL/L — HIGH (ref 96–108)
CO2 SERPL-SCNC: 27 MMOL/L — SIGNIFICANT CHANGE UP (ref 22–31)
CREAT SERPL-MCNC: 0.89 MG/DL — SIGNIFICANT CHANGE UP (ref 0.5–1.3)
CULTURE RESULTS: SIGNIFICANT CHANGE UP
CULTURE RESULTS: SIGNIFICANT CHANGE UP
GLUCOSE SERPL-MCNC: 89 MG/DL — SIGNIFICANT CHANGE UP (ref 70–99)
HCT VFR BLD CALC: 28 % — LOW (ref 39–50)
HGB BLD-MCNC: 8.6 G/DL — LOW (ref 13–17)
MCHC RBC-ENTMCNC: 27.2 PG — SIGNIFICANT CHANGE UP (ref 27–34)
MCHC RBC-ENTMCNC: 30.7 GM/DL — LOW (ref 32–36)
MCV RBC AUTO: 88.6 FL — SIGNIFICANT CHANGE UP (ref 80–100)
METHOD TYPE: SIGNIFICANT CHANGE UP
METHOD TYPE: SIGNIFICANT CHANGE UP
NRBC # BLD: 0 /100 WBCS — SIGNIFICANT CHANGE UP (ref 0–0)
OB PNL STL: POSITIVE
ORGANISM # SPEC MICROSCOPIC CNT: SIGNIFICANT CHANGE UP
PLATELET # BLD AUTO: 130 K/UL — LOW (ref 150–400)
POTASSIUM SERPL-MCNC: 3.4 MMOL/L — LOW (ref 3.5–5.3)
POTASSIUM SERPL-SCNC: 3.4 MMOL/L — LOW (ref 3.5–5.3)
PROT SERPL-MCNC: 5.4 GM/DL — LOW (ref 6–8.3)
RBC # BLD: 3.16 M/UL — LOW (ref 4.2–5.8)
RBC # FLD: 17.1 % — HIGH (ref 10.3–14.5)
SODIUM SERPL-SCNC: 145 MMOL/L — SIGNIFICANT CHANGE UP (ref 135–145)
SPECIMEN SOURCE: SIGNIFICANT CHANGE UP
SPECIMEN SOURCE: SIGNIFICANT CHANGE UP
WBC # BLD: 6.96 K/UL — SIGNIFICANT CHANGE UP (ref 3.8–10.5)
WBC # FLD AUTO: 6.96 K/UL — SIGNIFICANT CHANGE UP (ref 3.8–10.5)

## 2018-11-23 PROCEDURE — 93010 ELECTROCARDIOGRAM REPORT: CPT

## 2018-11-23 RX ORDER — POTASSIUM CHLORIDE 20 MEQ
40 PACKET (EA) ORAL ONCE
Qty: 0 | Refills: 0 | Status: COMPLETED | OUTPATIENT
Start: 2018-11-23 | End: 2018-11-23

## 2018-11-23 RX ADMIN — HEPARIN SODIUM 5000 UNIT(S): 5000 INJECTION INTRAVENOUS; SUBCUTANEOUS at 15:10

## 2018-11-23 RX ADMIN — MEROPENEM 100 MILLIGRAM(S): 1 INJECTION INTRAVENOUS at 22:58

## 2018-11-23 RX ADMIN — Medication 30 MILLILITER(S): at 22:40

## 2018-11-23 RX ADMIN — Medication 25 MILLIGRAM(S): at 05:58

## 2018-11-23 RX ADMIN — Medication 1 GRAM(S): at 05:59

## 2018-11-23 RX ADMIN — MEROPENEM 100 MILLIGRAM(S): 1 INJECTION INTRAVENOUS at 05:58

## 2018-11-23 RX ADMIN — OXYCODONE AND ACETAMINOPHEN 1 TABLET(S): 5; 325 TABLET ORAL at 19:54

## 2018-11-23 RX ADMIN — Medication 1 GRAM(S): at 16:40

## 2018-11-23 RX ADMIN — BICALUTAMIDE 50 MILLIGRAM(S): 50 TABLET, FILM COATED ORAL at 15:10

## 2018-11-23 RX ADMIN — ATORVASTATIN CALCIUM 80 MILLIGRAM(S): 80 TABLET, FILM COATED ORAL at 22:58

## 2018-11-23 RX ADMIN — Medication 40 MILLIEQUIVALENT(S): at 12:46

## 2018-11-23 RX ADMIN — OXYCODONE AND ACETAMINOPHEN 1 TABLET(S): 5; 325 TABLET ORAL at 11:16

## 2018-11-23 RX ADMIN — Medication 1 GRAM(S): at 11:16

## 2018-11-23 RX ADMIN — OXYCODONE AND ACETAMINOPHEN 1 TABLET(S): 5; 325 TABLET ORAL at 12:44

## 2018-11-23 RX ADMIN — PANTOPRAZOLE SODIUM 40 MILLIGRAM(S): 20 TABLET, DELAYED RELEASE ORAL at 05:59

## 2018-11-23 RX ADMIN — BICALUTAMIDE 50 MILLIGRAM(S): 50 TABLET, FILM COATED ORAL at 11:19

## 2018-11-23 RX ADMIN — Medication 81 MILLIGRAM(S): at 11:16

## 2018-11-23 RX ADMIN — Medication 1 TABLET(S): at 11:19

## 2018-11-23 RX ADMIN — OXYCODONE AND ACETAMINOPHEN 1 TABLET(S): 5; 325 TABLET ORAL at 03:48

## 2018-11-23 RX ADMIN — OXYCODONE AND ACETAMINOPHEN 1 TABLET(S): 5; 325 TABLET ORAL at 20:30

## 2018-11-23 RX ADMIN — MEROPENEM 100 MILLIGRAM(S): 1 INJECTION INTRAVENOUS at 15:09

## 2018-11-23 NOTE — PROGRESS NOTE ADULT - SUBJECTIVE AND OBJECTIVE BOX
HOSPITALIST ATTENDING PROGRESS NOTE    Chart and meds reviewed.  Patient seen and examined.    HPI: 77 y/o male with h/o metastatic prostate CA with peritoneal carcinomatosis, AAA, CAD, GERD, HLD presents to the ED with dull left side chest pain. Pt was in  ED last night for same symptoms, decided to leave AMA. Troponin and EKG was negative. Pt had negative cardiac work up on last admission at  on  after he presented with similar chest pain. Pt was discharge home on PO antibiotic for recent diagnosis of UTI. Per patient, he received call to come back to ED for abnormal lab finding. CP resolved. No SOB or wheezing. Denies fever, chills, abd pain, N/V/D.   Urine culture from 18 showed ESBL E/coli resistant to PO antibiotics.   UA done in ED grossly positive     18 pt seen and examined, says his chest pain has resolved. Has a hx of prostate CA with peritoneal carcinomatosis. discussed case with  who previously called him for positive urine cx. Discussed with ID who will see the patient.    18 pt seen and examined, on meropenem, is unaware of the plan of the nephrostomy tube with his urologist.     All 10 systems reviewed and found to be negative with the exception of what has been described above.    MEDICATIONS  (STANDING):  aspirin enteric coated 81 milliGRAM(s) Oral daily  atorvastatin 80 milliGRAM(s) Oral at bedtime  bicalutamide 50 milliGRAM(s) Oral daily  heparin  Injectable 5000 Unit(s) SubCutaneous every 8 hours  meropenem  IVPB 500 milliGRAM(s) IV Intermittent every 8 hours  metoprolol tartrate 25 milliGRAM(s) Oral three times a day  multivitamin 1 Tablet(s) Oral daily  pantoprazole    Tablet 40 milliGRAM(s) Oral before breakfast  sodium chloride 0.9%. 1000 milliLiter(s) (100 mL/Hr) IV Continuous <Continuous>  sucralfate 1 Gram(s) Oral four times a day    MEDICATIONS  (PRN):  acetaminophen   Tablet .. 650 milliGRAM(s) Oral every 6 hours PRN Temp greater or equal to 38C (100.4F), Mild Pain (1 - 3)  aluminum hydroxide/magnesium hydroxide/simethicone Suspension 30 milliLiter(s) Oral every 6 hours PRN Dyspepsia  oxyCODONE    5 mG/acetaminophen 325 mG 1 Tablet(s) Oral every 6 hours PRN Severe Pain (7 - 10)      VITALS:  T(F): 97.9 (18 @ 11:21), Max: 98.2 (18 @ 17:45)  HR: 63 (18 @ 11:21) (63 - 82)  BP: 103/59 (18 @ 11:21) (95/56 - 103/59)  RR: 18 (18 @ 11:21) (17 - 18)  SpO2: 98% (18 @ 11:21) (93% - 98%)  Wt(kg): --    I&O's Summary    2018 07:01  -  2018 07:00  --------------------------------------------------------  IN: 0 mL / OUT: 600 mL / NET: -600 mL        CAPILLARY BLOOD GLUCOSE          PHYSICAL EXAM:    HEENT:  pupils equal and reactive, EOMI, no oropharyngeal lesions, erythema, exudates, oral thrush    NECK:   supple, no carotid bruits, no palpable lymph nodes, no thyromegaly    CV:  +S1, +S2, regular, no murmurs or rubs    RESP:   lungs clear to auscultation bilaterally, no wheezing, rales, rhonchi, good air entry bilaterally    BREAST:  not examined    GI:  abdomen soft, non-tender, non-distended, normal BS, no bruits, no abdominal masses, no palpable masses    RECTAL:  not examined    :  not examined    MSK:   normal muscle tone, no atrophy, no rigidity, no contractions    EXT:   no clubbing, no cyanosis, no edema, no calf pain, swelling or erythema    VASCULAR:  pulses equal and symmetric in the upper and lower extremities    NEURO:  AAOX3, no focal neurological deficits, follows all commands, able to move extremities spontaneously    SKIN:  no ulcers, lesions or rashes    LABS:                            8.6    6.96  )-----------( 130      ( 2018 05:38 )             28.0         145  |  109<H>  |  19  ----------------------------<  89  3.4<L>   |  27  |  0.89    Ca    8.1<L>      2018 05:38    TPro  5.4<L>  /  Alb  2.1<L>  /  TBili  0.3  /  DBili  x   /  AST  33  /  ALT  9<L>  /  AlkPhos  290<H>  11-23    CARDIAC MARKERS ( 2018 06:29 )  0.016 ng/mL / x     / x     / x     / x      CARDIAC MARKERS ( 2018 20:30 )  <0.015 ng/mL / x     / x     / x     / x          LIVER FUNCTIONS - ( 2018 05:38 )  Alb: 2.1 g/dL / Pro: 5.4 gm/dL / ALK PHOS: 290 U/L / ALT: 9 U/L / AST: 33 U/L / GGT: x           PT/INR - ( 2018 20:30 )   PT: 17.7 sec;   INR: 1.57 ratio         PTT - ( 2018 20:30 )  PTT:32.7 sec  Urinalysis Basic - ( 2018 20:30 )    Color: Yellow / Appearance: very cloudy / S.020 / pH: x  Gluc: x / Ketone: Trace  / Bili: Small / Urobili: 1 mg/dL   Blood: x / Protein: 500 mg/dL / Nitrite: Positive   Leuk Esterase: Moderate / RBC: 6-10 /HPF / WBC >50   Sq Epi: x / Non Sq Epi: Occasional / Bacteria: Many                                    CULTURES:

## 2018-11-23 NOTE — PROVIDER CONTACT NOTE (OTHER) - BACKGROUND
Pt admitted for CP, UTI. Hx of CAD, GERD, prostate CA, neurogenic bladder. Pt on meropenem IVPB q 8hrs for UTI.

## 2018-11-23 NOTE — PROVIDER CONTACT NOTE (OTHER) - ASSESSMENT
Pt c/o difficulty breathing. No c/o chest pain, VS as charted. Pt had blood streaked BM. Stool sent for fecal occult. Pt in no acute distress.

## 2018-11-23 NOTE — PROGRESS NOTE ADULT - SUBJECTIVE AND OBJECTIVE BOX
Date of service: 18 @ 11:17    OOB to chair  Weak looking  No suprapubic pain    ROS: no fever or chills; no HA, no SOB or cough, no abdominal pain, no diarrhea or constipation; no legs pain, no rashes    MEDICATIONS  (STANDING):  aspirin enteric coated 81 milliGRAM(s) Oral daily  atorvastatin 80 milliGRAM(s) Oral at bedtime  bicalutamide 50 milliGRAM(s) Oral daily  heparin  Injectable 5000 Unit(s) SubCutaneous every 8 hours  meropenem  IVPB 500 milliGRAM(s) IV Intermittent every 8 hours  metoprolol tartrate 25 milliGRAM(s) Oral three times a day  multivitamin 1 Tablet(s) Oral daily  pantoprazole    Tablet 40 milliGRAM(s) Oral before breakfast  sodium chloride 0.9%. 1000 milliLiter(s) (100 mL/Hr) IV Continuous <Continuous>  sucralfate 1 Gram(s) Oral four times a day      Vital Signs Last 24 Hrs  T(C): 36.4 (2018 04:55), Max: 36.8 (2018 17:45)  T(F): 97.6 (2018 04:55), Max: 98.2 (2018 17:45)  HR: 71 (2018 04:55) (71 - 82)  BP: 96/62 (2018 04:55) (95/56 - 99/58)  BP(mean): --  RR: 18 (2018 04:55) (17 - 18)  SpO2: 94% (2018 04:55) (93% - 96%)    Physical Exam:      Constitutional: frail looking  HEENT: NC/AT, EOMI, PERRLA, conjunctivae clear  Neck: supple; thyroid not palpable  Back: no tenderness  Respiratory: respiratory effort normal; decreased BS at bases  Cardiovascular: S1S2 regular, no murmurs  Abdomen: soft, not tender, not distended, positive BS  Genitourinary: no suprapubic tenderness; b/l tubes  Lymphatic: no LN palpable  Musculoskeletal: no muscle tenderness, no joint swelling or tenderness  Extremities: no pedal edema  Neurological/ Psychiatric: AxOx3, moving all extremities  Skin: no rashes; no palpable lesions    Labs: all available labs reviewed                        8.9    7.66  )-----------( 116      ( 2018 06:29 )             28.9         144  |  109<H>  |  23  ----------------------------<  89  3.5   |  25  |  0.86    Ca    8.0<L>      2018 06:29  Phos  3.3       Mg     2.2         TPro  6.3  /  Alb  2.4<L>  /  TBili  0.6  /  DBili  x   /  AST  53<H>  /  ALT  13  /  AlkPhos  314<H>       LIVER FUNCTIONS - ( 2018 20:30 )  Alb: 2.4 g/dL / Pro: 6.3 gm/dL / ALK PHOS: 314 U/L / ALT: 13 U/L / AST: 53 U/L / GGT: x           Urinalysis Basic - ( 2018 20:30 )    Color: Yellow / Appearance: very cloudy / S.020 / pH: x  Gluc: x / Ketone: Trace  / Bili: Small / Urobili: 1 mg/dL   Blood: x / Protein: 500 mg/dL / Nitrite: Positive   Leuk Esterase: Moderate / RBC: 6-10 /HPF / WBC >50   Sq Epi: x / Non Sq Epi: Occasional / Bacteria: Many      Culture - Urine (collected 2018 04:45)  Source: .Urine None  Preliminary Report (2018 09:24):    >100,000 CFU/ml Escherichia coli    Culture - Urine (collected 2018 22:30)  Source: .Urine Nephrostomy - Left  Final Report (2018 10:56):    >100,000 CFU/ml Escherichia coli ESBL  Organism: Escherichia coli ESBL (2018 10:56)  Organism: Escherichia coli ESBL (2018 10:56)      -  Amikacin: S <=8      -  Amoxicillin/Clavulanic Acid: I 16/8      -  Ampicillin: R >16 These ampicillin results predict results for amoxicillin      -  Ampicillin/Sulbactam: R 16/8      -  Aztreonam: R 16      -  Cefazolin: R >16 For uncomplicated UTI with K. pneumoniae, E. coli, or P. mirablis: JERAMY <=16 is sensitive and JERAMY >=32 is resistant. This also predicts results for oral agents cefaclor, cefdinir, cefpodoxime, cefprozil, cefuroxime axetil, cephalexin and locarbef for uncomplicated UTI. Note that some isolates may be susceptible to these agents while testing resistant to cefazolin.      -  Cefepime: R 4      -  Cefoxitin: S <=4      -  Ceftriaxone: R >32 Enterobacter, Citrobacter, and Serratia may develop resistance during prolonged therapy      -  Ciprofloxacin: R >2      -  Ertapenem: S <=0.5      -  Gentamicin: S <=1      -  Imipenem: S <=1      -  Levofloxacin: R >4      -  Meropenem: S <=1      -  Nitrofurantoin: S <=32 Should not be used to treat pyelonephritis      -  Piperacillin/Tazobactam: R <=8      -  Tigecycline: S <=1      -  Tobramycin: R >8      -  Trimethoprim/Sulfamethoxazole: R >2/38      Method Type: JERAMY    Culture - Urine (11.21.18 @ 20:30)    Specimen Source: .Urine Clean Catch (Midstream)    Culture Results:   10,000 - 49,000 CFU/mL Enterococcus faecium        Radiology: all available radiological tests reviewed    Advanced directives addressed: full resuscitation

## 2018-11-23 NOTE — PROVIDER CONTACT NOTE (CRITICAL VALUE NOTIFICATION) - SITUATION
Lab calling to report critical value in urine culture done on 11/21 at 20:30. 10,000-49,000 enterococcus faecium, vanco resistant.

## 2018-11-23 NOTE — PROGRESS NOTE ADULT - ASSESSMENT
79 y/o male with h/o metastatic prostate CA with peritoneal carcinomatosis, b/l nephrostomy tubes, AAA, CAD, GERD, HLD was admitted on 11/21 for dull left side chest pain. Pt was in  ED the night PTA for same symptoms, decided to leave AMA. Troponin and EKG was negative. Pt had cardiac work up on last admission at  on 11/14 after he presented with similar chest pain. At that time urine cultures were collected and he was discharge home on PO antibiotics. Later, urine cultures were reported with ESBL E.coli and the patient was asked to return to the hospital. In ER, he was given meropenem.    1. UTI with ESBL E.coli. B/l nephrostomy tubes. Metastatic prostate Ca.  -probable underlying urinary obstruction  -repeat urine culture shows small amount of EN faecium ?contaminant  -f/u BC x 2 and urine c/s  -on meropenem 500 mg IV q8h # 2  -tolerating abx well so far; no side effects noted  -continue abx coverage  -monitor temps  -f/u CBC  -supportive care  2. Other issues:   -care per medicine

## 2018-11-23 NOTE — PROGRESS NOTE ADULT - ASSESSMENT
79 y/o male with h/o metastatic prostate CA with peritoneal carcinomatosis, AAA, CAD, GERD, HLD presents to the ED with dull left side chest pain. Pt was in  ED last night for same symptoms, decided to leave AMA. Troponin and EKG was negative. Pt had negative cardiac work up on last admission at  on 11/14 after he presented with similar chest pain. Pt was discharge home on PO antibiotic for recent diagnosis of UTI. Per patient, he received call to come back to ED for abnormal lab finding. In the ED it was confirmed that he got the call for recent urine culture report.   Urine culture from 11/16/18 showed ESBL E/coli resistant to PO antibiotics. UA done in ED grossly positive. Got 1 dose of IV Ertapenem.   CP resolved. No SOB or wheezing. Denies fever, chills, abd pain, N/V/D.       # Chest pain r/o ACS: likely musculoskeletal, appreciate cardio input  -Continue with Aspirin/Statin and BB    # ESBL UTI  -Continue with Ertapenem   -follow ID consult  - Deedee urologist is at the [facility] and does not know the name    #GERD  -Continue with PPI    #Prostate cancer on chemo  -F/u with oncology outpatient    # B/L Nephrostomy tube  - [facility]    DVT ppx  Heparin sc q8 hrs .

## 2018-11-23 NOTE — PROVIDER CONTACT NOTE (OTHER) - ACTION/TREATMENT ORDERED:
MD notified, assessed pt. Stat EKG done. Awaiting further orders. Pt remains stable, will cont to monitor.

## 2018-11-24 DIAGNOSIS — K92.2 GASTROINTESTINAL HEMORRHAGE, UNSPECIFIED: ICD-10-CM

## 2018-11-24 LAB
ALBUMIN SERPL ELPH-MCNC: 2.1 G/DL — LOW (ref 3.3–5)
ALP SERPL-CCNC: 313 U/L — HIGH (ref 40–120)
ALT FLD-CCNC: 11 U/L — LOW (ref 12–78)
ANION GAP SERPL CALC-SCNC: 7 MMOL/L — SIGNIFICANT CHANGE UP (ref 5–17)
AST SERPL-CCNC: 30 U/L — SIGNIFICANT CHANGE UP (ref 15–37)
BILIRUB SERPL-MCNC: 0.3 MG/DL — SIGNIFICANT CHANGE UP (ref 0.2–1.2)
BUN SERPL-MCNC: 17 MG/DL — SIGNIFICANT CHANGE UP (ref 7–23)
CALCIUM SERPL-MCNC: 8.1 MG/DL — LOW (ref 8.5–10.1)
CHLORIDE SERPL-SCNC: 108 MMOL/L — SIGNIFICANT CHANGE UP (ref 96–108)
CO2 SERPL-SCNC: 28 MMOL/L — SIGNIFICANT CHANGE UP (ref 22–31)
CREAT SERPL-MCNC: 0.95 MG/DL — SIGNIFICANT CHANGE UP (ref 0.5–1.3)
GLUCOSE SERPL-MCNC: 101 MG/DL — HIGH (ref 70–99)
HCT VFR BLD CALC: 27.4 % — LOW (ref 39–50)
HCT VFR BLD CALC: 27.8 % — LOW (ref 39–50)
HGB BLD-MCNC: 8.3 G/DL — LOW (ref 13–17)
HGB BLD-MCNC: 8.4 G/DL — LOW (ref 13–17)
MCHC RBC-ENTMCNC: 26.8 PG — LOW (ref 27–34)
MCHC RBC-ENTMCNC: 30.2 GM/DL — LOW (ref 32–36)
MCV RBC AUTO: 88.8 FL — SIGNIFICANT CHANGE UP (ref 80–100)
NRBC # BLD: 0 /100 WBCS — SIGNIFICANT CHANGE UP (ref 0–0)
PLATELET # BLD AUTO: 124 K/UL — LOW (ref 150–400)
POTASSIUM SERPL-MCNC: 3.8 MMOL/L — SIGNIFICANT CHANGE UP (ref 3.5–5.3)
POTASSIUM SERPL-SCNC: 3.8 MMOL/L — SIGNIFICANT CHANGE UP (ref 3.5–5.3)
PROT SERPL-MCNC: 5.5 GM/DL — LOW (ref 6–8.3)
RBC # BLD: 3.13 M/UL — LOW (ref 4.2–5.8)
RBC # FLD: 17.1 % — HIGH (ref 10.3–14.5)
SODIUM SERPL-SCNC: 143 MMOL/L — SIGNIFICANT CHANGE UP (ref 135–145)
TROPONIN I SERPL-MCNC: 0.02 NG/ML — SIGNIFICANT CHANGE UP (ref 0.01–0.04)
TROPONIN I SERPL-MCNC: <0.015 NG/ML — SIGNIFICANT CHANGE UP (ref 0.01–0.04)
WBC # BLD: 6.36 K/UL — SIGNIFICANT CHANGE UP (ref 3.8–10.5)
WBC # FLD AUTO: 6.36 K/UL — SIGNIFICANT CHANGE UP (ref 3.8–10.5)

## 2018-11-24 PROCEDURE — 71275 CT ANGIOGRAPHY CHEST: CPT | Mod: 26

## 2018-11-24 PROCEDURE — 93010 ELECTROCARDIOGRAM REPORT: CPT

## 2018-11-24 RX ORDER — SODIUM CHLORIDE 9 MG/ML
1000 INJECTION INTRAMUSCULAR; INTRAVENOUS; SUBCUTANEOUS
Qty: 0 | Refills: 0 | Status: DISCONTINUED | OUTPATIENT
Start: 2018-11-24 | End: 2018-11-30

## 2018-11-24 RX ADMIN — Medication 1 GRAM(S): at 11:22

## 2018-11-24 RX ADMIN — Medication 1 TABLET(S): at 11:21

## 2018-11-24 RX ADMIN — ATORVASTATIN CALCIUM 80 MILLIGRAM(S): 80 TABLET, FILM COATED ORAL at 22:03

## 2018-11-24 RX ADMIN — Medication 1 GRAM(S): at 06:10

## 2018-11-24 RX ADMIN — Medication 81 MILLIGRAM(S): at 11:22

## 2018-11-24 RX ADMIN — BICALUTAMIDE 50 MILLIGRAM(S): 50 TABLET, FILM COATED ORAL at 11:22

## 2018-11-24 RX ADMIN — OXYCODONE AND ACETAMINOPHEN 1 TABLET(S): 5; 325 TABLET ORAL at 15:37

## 2018-11-24 RX ADMIN — OXYCODONE AND ACETAMINOPHEN 1 TABLET(S): 5; 325 TABLET ORAL at 02:43

## 2018-11-24 RX ADMIN — MEROPENEM 100 MILLIGRAM(S): 1 INJECTION INTRAVENOUS at 22:02

## 2018-11-24 RX ADMIN — OXYCODONE AND ACETAMINOPHEN 1 TABLET(S): 5; 325 TABLET ORAL at 09:34

## 2018-11-24 RX ADMIN — MEROPENEM 100 MILLIGRAM(S): 1 INJECTION INTRAVENOUS at 14:39

## 2018-11-24 RX ADMIN — OXYCODONE AND ACETAMINOPHEN 1 TABLET(S): 5; 325 TABLET ORAL at 03:15

## 2018-11-24 RX ADMIN — Medication 30 MILLILITER(S): at 15:45

## 2018-11-24 RX ADMIN — MEROPENEM 100 MILLIGRAM(S): 1 INJECTION INTRAVENOUS at 06:10

## 2018-11-24 RX ADMIN — SODIUM CHLORIDE 60 MILLILITER(S): 9 INJECTION INTRAMUSCULAR; INTRAVENOUS; SUBCUTANEOUS at 12:26

## 2018-11-24 RX ADMIN — OXYCODONE AND ACETAMINOPHEN 1 TABLET(S): 5; 325 TABLET ORAL at 16:52

## 2018-11-24 RX ADMIN — OXYCODONE AND ACETAMINOPHEN 1 TABLET(S): 5; 325 TABLET ORAL at 10:19

## 2018-11-24 RX ADMIN — Medication 1 GRAM(S): at 18:00

## 2018-11-24 RX ADMIN — PANTOPRAZOLE SODIUM 40 MILLIGRAM(S): 20 TABLET, DELAYED RELEASE ORAL at 06:10

## 2018-11-24 NOTE — CONSULT NOTE ADULT - SUBJECTIVE AND OBJECTIVE BOX
HPI:  79 y/o male with h/o metastatic prostate CA with peritoneal carcinomatosis, AAA, CAD, GERD, HLD presents to the ED with dull left side chest pain. Pt was in  ED last night for same symptoms, decided to leave AMA. Troponin and EKG was negative. Pt had negative cardiac work up on last admission at  on  after he presented with similar chest pain. Pt was discharge home on PO antibiotic for recent diagnosis of UTI. Per patient, he received call to come back to ED for abnormal lab finding. CP resolved. No SOB or wheezing. Denies fever, chills, abd pain, N/V/D.   Urine culture from 18 showed ESBL E/coli resistant to PO antibiotics.   UA done in ED grossly positive     PSH: as above and cholecystectomy, right cataract, THR, nephrostomy tube  Family Hx: Father-stroke; Mother-stroke,  age 80s (2018 00:16)      PAST MEDICAL & SURGICAL HISTORY:  CAD (coronary artery disease)  HLD (hyperlipidemia)  GERD (gastroesophageal reflux disease)  Colon obstruction  Prostate CA  Gallstones  Neurogenic bladder  Carotid stenosis: right and left  AAA (abdominal aortic aneurysm):   Left rib fracture  S/P cataract surgery, right  H/O hemicolectomy  History of intestinal surgery  History of cholecystectomy  THR (Total Hip Replacement)      MEDICATIONS  (STANDING):  aspirin enteric coated 81 milliGRAM(s) Oral daily  atorvastatin 80 milliGRAM(s) Oral at bedtime  bicalutamide 50 milliGRAM(s) Oral daily  meropenem  IVPB 500 milliGRAM(s) IV Intermittent every 8 hours  metoprolol tartrate 25 milliGRAM(s) Oral three times a day  multivitamin 1 Tablet(s) Oral daily  pantoprazole    Tablet 40 milliGRAM(s) Oral before breakfast  sodium chloride 0.9%. 1000 milliLiter(s) (60 mL/Hr) IV Continuous <Continuous>  sodium chloride 0.9%. 1000 milliLiter(s) (100 mL/Hr) IV Continuous <Continuous>  sucralfate 1 Gram(s) Oral four times a day    MEDICATIONS  (PRN):  acetaminophen   Tablet .. 650 milliGRAM(s) Oral every 6 hours PRN Temp greater or equal to 38C (100.4F), Mild Pain (1 - 3)  aluminum hydroxide/magnesium hydroxide/simethicone Suspension 30 milliLiter(s) Oral every 6 hours PRN Dyspepsia  oxyCODONE    5 mG/acetaminophen 325 mG 1 Tablet(s) Oral every 6 hours PRN Severe Pain (7 - 10)      Allergies    morphine (Vomiting)  sulfa drugs (Other)    Intolerances        SOCIAL HISTORY:    FAMILY HISTORY:  No pertinent family history in first degree relatives   Non-contributory    REVIEW OF SYSTEMS      General:	    Respiratory and Thorax:  	  Cardiovascular:	    Gastrointestinal:	    Musculoskeletal:	   Vital Signs Last 24 Hrs  T(C): 36.8 (2018 16:40), Max: 36.8 (2018 16:40)  T(F): 98.2 (2018 16:40), Max: 98.2 (2018 16:40)  HR: 83 (2018 16:40) (65 - 83)  BP: 104/55 (2018 16:40) (95/53 - 118/75)  BP(mean): --  RR: 16 (2018 16:40) (16 - 18)  SpO2: 95% (2018 16:40) (95% - 100%)    HEENT :No Pallor.No icterus. EOMI,PERLAA  Chest : Clear to Auscultation  CVS : S1S2 Normal.No murmurs.  Abdomen: Soft.Non tender .Normal bowel sounds.No Organomegaly.  CNS: Alert.Oriented to Time,Place and Person.No focal deficit.  EXT: Normal Range of motion.No pitting edema.    LABS:                        8.4    6.36  )-----------( 124      ( 2018 05:48 )             27.8     11-24    143  |  108  |  17  ----------------------------<  101<H>  3.8   |  28  |  0.95    Ca    8.1<L>      2018 05:48    TPro  5.5<L>  /  Alb  2.1<L>  /  TBili  0.3  /  DBili  x   /  AST  30  /  ALT  11<L>  /  AlkPhos  313<H>  11-24      LIVER FUNCTIONS - ( 2018 05:48 )  Alb: 2.1 g/dL / Pro: 5.5 gm/dL / ALK PHOS: 313 U/L / ALT: 11 U/L / AST: 30 U/L / GGT: x             RADIOLOGY & ADDITIONAL STUDIES:

## 2018-11-24 NOTE — PROGRESS NOTE ADULT - SUBJECTIVE AND OBJECTIVE BOX
· Subjective and Objective: 	  HOSPITALIST ATTENDING PROGRESS NOTE    Chart and meds reviewed.  Patient seen and examined.    HPI: 79 y/o male with h/o metastatic prostate CA with peritoneal carcinomatosis, AAA, CAD, GERD, HLD presents to the ED with dull left side chest pain. Pt was in  ED last night for same symptoms, decided to leave AMA. Troponin and EKG was negative. Pt had negative cardiac work up on last admission at  on 11/14 after he presented with similar chest pain. Pt was discharge home on PO antibiotic for recent diagnosis of UTI. Per patient, he received call to come back to ED for abnormal lab finding. CP resolved. No SOB or wheezing. Denies fever, chills, abd pain, N/V/D.   Urine culture from 11/16/18 showed ESBL E/coli resistant to PO antibiotics.   UA done in ED grossly [id number]/22/18 pt seen and examined, says his chest pain has resolved. Has a hx of prostate CA with peritoneal carcinomatosis. discussed case with  who previously called him for positive urine cx. Discussed with ID who will see the patient.    11/23/18 pt seen and examined, on meropenem, is unaware of the plan of the nephrostomy tube with his urologist.  11/24- overnight CP, EKG no change, troponin x1 wnl, currently still with stable dull left chest pain ,no SOB ,appears very comfortable, no palpitations, sinusrhythm 70 on tele, hemaodynamically stable,no nausea, no diaphoresis, reports this pain comes and goes and thinks it is gerd     PHYSICAL EXAM:    HEENT:  pupils equal and reactive, EOMI, no oropharyngeal lesions, erythema, exudates, oral thrush    NECK:   supple, no carotid bruits, no palpable lymph nodes, no thyromegaly    CV:  +S1, +S2, regular, no murmurs or rubs    RESP:   lungs clear to auscultation bilaterally, no wheezing, rales, rhonchi, good air entry bilaterally    BREAST:  not examined    GI:  abdomen soft, non-tender, non-distended, normal BS, no bruits, no abdominal masses, no palpable masses    RECTAL:  not examined    :  not examined    MSK:   normal muscle tone, no atrophy, no rigidity, no contractions    EXT:   no clubbing, no cyanosis, no edema, no calf pain, swelling or erythema    VASCULAR:  pulses equal and symmetric in the upper and lower extremities    NEURO:  AAOX3, no focal neurological deficits, follows all commands, able to move extremities spontaneously    SKIN:  no ulcers, lesions or rashes      PHYSICAL EXAM:    Daily     Daily     ICU Vital Signs Last 24 Hrs  T(C): 36.7 (24 Nov 2018 11:03), Max: 36.7 (24 Nov 2018 11:03)  T(F): 98 (24 Nov 2018 11:03), Max: 98 (24 Nov 2018 11:03)  HR: 74 (24 Nov 2018 11:03) (65 - 74)  BP: 107/71 (24 Nov 2018 11:03) (101/64 - 108/66)  BP(mean): --  ABP: --  ABP(mean): --  RR: 18 (24 Nov 2018 11:03) (17 - 18)  SpO2: 97% (24 Nov 2018 11:03) (97% - 100%)                                8.4    6.36  )-----------( 124      ( 24 Nov 2018 05:48 )             27.8       CBC Full  -  ( 24 Nov 2018 05:48 )  WBC Count : 6.36 K/uL  Hemoglobin : 8.4 g/dL  Hematocrit : 27.8 %  Platelet Count - Automated : 124 K/uL  Mean Cell Volume : 88.8 fl  Mean Cell Hemoglobin : 26.8 pg  Mean Cell Hemoglobin Concentration : 30.2 gm/dL  Auto Neutrophil # : x  Auto Lymphocyte # : x  Auto Monocyte # : x  Auto Eosinophil # : x  Auto Basophil # : x  Auto Neutrophil % : x  Auto Lymphocyte % : x  Auto Monocyte % : x  Auto Eosinophil % : x  Auto Basophil % : x      11-24    143  |  108  |  17  ----------------------------<  101<H>  3.8   |  28  |  0.95    Ca    8.1<L>      24 Nov 2018 05:48    TPro  5.5<L>  /  Alb  2.1<L>  /  TBili  0.3  /  DBili  x   /  AST  30  /  ALT  11<L>  /  AlkPhos  313<H>  11-24      LIVER FUNCTIONS - ( 24 Nov 2018 05:48 )  Alb: 2.1 g/dL / Pro: 5.5 gm/dL / ALK PHOS: 313 U/L / ALT: 11 U/L / AST: 30 U/L / GGT: x                 CARDIAC MARKERS ( 23 Nov 2018 23:52 )  0.017 ng/mL / x     / x     / x     / x                    MEDICATIONS  (STANDING):  aspirin enteric coated 81 milliGRAM(s) Oral daily  atorvastatin 80 milliGRAM(s) Oral at bedtime  bicalutamide 50 milliGRAM(s) Oral daily  heparin  Injectable 5000 Unit(s) SubCutaneous every 8 hours  meropenem  IVPB 500 milliGRAM(s) IV Intermittent every 8 hours  metoprolol tartrate 25 milliGRAM(s) Oral three times a day  multivitamin 1 Tablet(s) Oral daily  pantoprazole    Tablet 40 milliGRAM(s) Oral before breakfast  sodium chloride 0.9%. 1000 milliLiter(s) (60 mL/Hr) IV Continuous <Continuous>  sodium chloride 0.9%. 1000 milliLiter(s) (100 mL/Hr) IV Continuous <Continuous>  sucralfate 1 Gram(s) Oral four times a day

## 2018-11-24 NOTE — PROGRESS NOTE ADULT - ASSESSMENT
· Assessment		  77 y/o male with h/o metastatic prostate CA with peritoneal carcinomatosis, AAA, CAD, GERD, HLD presents to the ED with dull left side chest pain. Pt was in  ED last night for same symptoms, decided to leave AMA. Troponin and EKG was negative. Pt had negative cardiac work up on last admission at  on 11/14 after he presented with similar chest pain. Pt was discharge home on PO antibiotic for recent diagnosis of UTI. Per patient, he received call to come back to ED for abnormal lab finding. In the ED it was confirmed that he got the call for recent urine culture report.   Urine culture from 11/16/18 showed ESBL E/coli resistant to PO antibiotics. UA done in ED grossly positive. Got 1 dose of IV Ertapenem.   CP resolved. No SOB or wheezing. Denies fever, chills, abd pain, N/V/D.       # Chest pain constant, ACS ruled out: likely musculoskeletal vs GERD, appreciate cardio input  would also rule out PE   with CTA  (has risk factors for PE, although symptomatically and hemodynamically less likely)and IVF before and after IV contrast  tele sinus rhythm  ,no significant alarms  GI consult r/o GERD, continue PPI  if CTA and repeat Trop neg then d/c tele  -Continue with Aspirin/Statin and BB    # acute blood loss anemia/FOBT positive  GI consult to consider EGD /colonoscopy  continue PPI  denies karina or BRBPR    # ESBL UTI  -Continue with Ertapenem   -follow ID consult  - [facility]rologist [facility] and [facility] not [facility] the name        #Prostate cancer on chemo  -F/u with oncology outpatient    # B/L Nephrostomy tube- plan per [facility]- discussed with ID    DVT ppx  SCD for now due to gi blood loss

## 2018-11-24 NOTE — CHART NOTE - NSCHARTNOTEFT_GEN_A_CORE
Provider notified by nurse due to sob & chest pain. Pt reports having pain in his chest that feels like it will explode. He reports that it is his GERD and that he's felt this before and it is GERD. Pt denies shortness of breath.     Vitals  T 97.9  HR 63  /59  RR 18  O2 98% on RA    Gen: comfortable, alert, NAD  CV: RRR, +s1/s2, no M/R/G  Pulm: nl respiratory effort, CTAB, no crackles/wheezes/rhonchi     EKG: no changes compared to previous EKG  trop: 0.017    a/p: 77 yo M w/ chest pain, possibly GERD, unlikely ACS   - continue to monitor  - trial of maalox

## 2018-11-24 NOTE — CONSULT NOTE ADULT - ASSESSMENT
Occult GIbleed  Metastatic Prostate CA  Anemia multifactorial including secondary to metastatic CA  Last Colonoscopy >10yrs ago  H/O Fall with Left rib fracture

## 2018-11-24 NOTE — PROGRESS NOTE ADULT - ASSESSMENT
79 y/o male with h/o metastatic prostate CA with peritoneal carcinomatosis, b/l nephrostomy tubes, AAA, CAD, GERD, HLD was admitted on 11/21 for dull left side chest pain. Pt was in  ED the night PTA for same symptoms, decided to leave AMA. Troponin and EKG was negative. Pt had cardiac work up on last admission at  on 11/14 after he presented with similar chest pain. At that time urine cultures were collected and he was discharge home on PO antibiotics. Later, urine cultures were reported with ESBL E.coli and the patient was asked to return to the hospital. In ER, he was given meropenem.    1. UTI with ESBL E.coli. B/l nephrostomy tubes. Metastatic prostate Ca.  -improving  -repeat urine culture shows small amount of EN faecium likely contaminant  -BC x 2 and urine c/s noted  -on meropenem 500 mg IV q8h # 3  -tolerating abx well so far; no side effects noted  -continue abx coverage  -monitor temps  -f/u CBC  -supportive care  2. Other issues:   -care per medicine

## 2018-11-24 NOTE — PROGRESS NOTE ADULT - SUBJECTIVE AND OBJECTIVE BOX
Date of service: 18 @ 11:33    Lying in bed in NAD  Had an episode of chest discomfort last night  Denies suprapubic pain    ROS: no fever or chills; denies dizziness, no HA, no SOB or cough, no abdominal pain, no diarrhea or constipation; no legs pain, no rashes    MEDICATIONS  (STANDING):  aspirin enteric coated 81 milliGRAM(s) Oral daily  atorvastatin 80 milliGRAM(s) Oral at bedtime  bicalutamide 50 milliGRAM(s) Oral daily  heparin  Injectable 5000 Unit(s) SubCutaneous every 8 hours  meropenem  IVPB 500 milliGRAM(s) IV Intermittent every 8 hours  metoprolol tartrate 25 milliGRAM(s) Oral three times a day  multivitamin 1 Tablet(s) Oral daily  pantoprazole    Tablet 40 milliGRAM(s) Oral before breakfast  sodium chloride 0.9%. 1000 milliLiter(s) (100 mL/Hr) IV Continuous <Continuous>  sucralfate 1 Gram(s) Oral four times a day      Vital Signs Last 24 Hrs  T(C): 36.7 (2018 11:03), Max: 36.7 (2018 11:03)  T(F): 98 (2018 11:03), Max: 98 (2018 11:03)  HR: 74 (2018 11:03) (65 - 74)  BP: 107/71 (2018 11:03) (101/64 - 108/66)  BP(mean): --  RR: 18 (2018 11:03) (17 - 18)  SpO2: 97% (2018 11:03) (97% - 100%)    Physical Exam:      Constitutional: frail looking  HEENT: NC/AT, EOMI, PERRLA, conjunctivae clear  Neck: supple; thyroid not palpable  Back: no tenderness  Respiratory: respiratory effort normal; decreased BS at bases  Cardiovascular: S1S2 regular, no murmurs  Abdomen: soft, not tender, not distended, positive BS  Genitourinary: no suprapubic tenderness; b/l tubes  Lymphatic: no LN palpable  Musculoskeletal: no muscle tenderness, no joint swelling or tenderness  Extremities: no pedal edema  Neurological/ Psychiatric: AxOx3, moving all extremities  Skin: no rashes; no palpable lesions    Labs: reviewed                        8.4    6.36  )-----------( 124      ( 2018 05:48 )             27.8     11-24    143  |  108  |  17  ----------------------------<  101<H>  3.8   |  28  |  0.95    Ca    8.1<L>      2018 05:48    TPro  5.5<L>  /  Alb  2.1<L>  /  TBili  0.3  /  DBili  x   /  AST  30  /  ALT  11<L>  /  AlkPhos  313<H>  1124                        8.9    7.66  )-----------( 116      ( 2018 06:29 )             28.9     11-    144  |  109<H>  |  23  ----------------------------<  89  3.5   |  25  |  0.86    Ca    8.0<L>      2018 06:29  Phos  3.3     -  Mg     2.2     -    TPro  6.3  /  Alb  2.4<L>  /  TBili  0.6  /  DBili  x   /  AST  53<H>  /  ALT  13  /  AlkPhos  314<H>       LIVER FUNCTIONS - ( 2018 20:30 )  Alb: 2.4 g/dL / Pro: 6.3 gm/dL / ALK PHOS: 314 U/L / ALT: 13 U/L / AST: 53 U/L / GGT: x           Urinalysis Basic - ( 2018 20:30 )    Color: Yellow / Appearance: very cloudy / S.020 / pH: x  Gluc: x / Ketone: Trace  / Bili: Small / Urobili: 1 mg/dL   Blood: x / Protein: 500 mg/dL / Nitrite: Positive   Leuk Esterase: Moderate / RBC: 6-10 /HPF / WBC >50   Sq Epi: x / Non Sq Epi: Occasional / Bacteria: Many      Culture - Urine (collected 2018 04:45)  Source: .Urine None  Preliminary Report (2018 09:24):    >100,000 CFU/ml Escherichia coli    Culture - Urine (collected 2018 22:30)  Source: .Urine Nephrostomy - Left  Final Report (2018 10:56):    >100,000 CFU/ml Escherichia coli ESBL  Organism: Escherichia coli ESBL (2018 10:56)  Organism: Escherichia coli ESBL (2018 10:56)      -  Amikacin: S <=8      -  Amoxicillin/Clavulanic Acid: I 16/8      -  Ampicillin: R >16 These ampicillin results predict results for amoxicillin      -  Ampicillin/Sulbactam: R 16/8      -  Aztreonam: R 16      -  Cefazolin: R >16 For uncomplicated UTI with K. pneumoniae, E. coli, or P. mirablis: JERAMY <=16 is sensitive and JERAMY >=32 is resistant. This also predicts results for oral agents cefaclor, cefdinir, cefpodoxime, cefprozil, cefuroxime axetil, cephalexin and locarbef for uncomplicated UTI. Note that some isolates may be susceptible to these agents while testing resistant to cefazolin.      -  Cefepime: R 4      -  Cefoxitin: S <=4      -  Ceftriaxone: R >32 Enterobacter, Citrobacter, and Serratia may develop resistance during prolonged therapy      -  Ciprofloxacin: R >2      -  Ertapenem: S <=0.5      -  Gentamicin: S <=1      -  Imipenem: S <=1      -  Levofloxacin: R >4      -  Meropenem: S <=1      -  Nitrofurantoin: S <=32 Should not be used to treat pyelonephritis      -  Piperacillin/Tazobactam: R <=8      -  Tigecycline: S <=1      -  Tobramycin: R >8      -  Trimethoprim/Sulfamethoxazole: R >2/38      Method Type: JERAMY    Culture - Urine (11.21.18 @ 20:30)    Specimen Source: .Urine Clean Catch (Midstream)    Culture Results:   10,000 - 49,000 CFU/mL Enterococcus faecium        Radiology: all available radiological tests reviewed    Advanced directives addressed: full resuscitation

## 2018-11-25 DIAGNOSIS — E43 UNSPECIFIED SEVERE PROTEIN-CALORIE MALNUTRITION: ICD-10-CM

## 2018-11-25 DIAGNOSIS — C78.6 SECONDARY MALIGNANT NEOPLASM OF RETROPERITONEUM AND PERITONEUM: ICD-10-CM

## 2018-11-25 DIAGNOSIS — Z66 DO NOT RESUSCITATE: ICD-10-CM

## 2018-11-25 DIAGNOSIS — R07.9 CHEST PAIN, UNSPECIFIED: ICD-10-CM

## 2018-11-25 DIAGNOSIS — I25.10 ATHEROSCLEROTIC HEART DISEASE OF NATIVE CORONARY ARTERY WITHOUT ANGINA PECTORIS: ICD-10-CM

## 2018-11-25 DIAGNOSIS — E78.5 HYPERLIPIDEMIA, UNSPECIFIED: ICD-10-CM

## 2018-11-25 DIAGNOSIS — C61 MALIGNANT NEOPLASM OF PROSTATE: ICD-10-CM

## 2018-11-25 DIAGNOSIS — Z92.21 PERSONAL HISTORY OF ANTINEOPLASTIC CHEMOTHERAPY: ICD-10-CM

## 2018-11-25 DIAGNOSIS — C78.00 SECONDARY MALIGNANT NEOPLASM OF UNSPECIFIED LUNG: ICD-10-CM

## 2018-11-25 DIAGNOSIS — K21.9 GASTRO-ESOPHAGEAL REFLUX DISEASE WITHOUT ESOPHAGITIS: ICD-10-CM

## 2018-11-25 DIAGNOSIS — C78.7 SECONDARY MALIGNANT NEOPLASM OF LIVER AND INTRAHEPATIC BILE DUCT: ICD-10-CM

## 2018-11-25 DIAGNOSIS — I71.9 AORTIC ANEURYSM OF UNSPECIFIED SITE, WITHOUT RUPTURE: ICD-10-CM

## 2018-11-25 DIAGNOSIS — I95.9 HYPOTENSION, UNSPECIFIED: ICD-10-CM

## 2018-11-25 LAB
ALBUMIN SERPL ELPH-MCNC: 2 G/DL — LOW (ref 3.3–5)
ALP SERPL-CCNC: 311 U/L — HIGH (ref 40–120)
ALT FLD-CCNC: 14 U/L — SIGNIFICANT CHANGE UP (ref 12–78)
ANION GAP SERPL CALC-SCNC: 7 MMOL/L — SIGNIFICANT CHANGE UP (ref 5–17)
AST SERPL-CCNC: 37 U/L — SIGNIFICANT CHANGE UP (ref 15–37)
BASOPHILS # BLD AUTO: 0.03 K/UL — SIGNIFICANT CHANGE UP (ref 0–0.2)
BASOPHILS NFR BLD AUTO: 0.5 % — SIGNIFICANT CHANGE UP (ref 0–2)
BILIRUB SERPL-MCNC: 0.4 MG/DL — SIGNIFICANT CHANGE UP (ref 0.2–1.2)
BUN SERPL-MCNC: 15 MG/DL — SIGNIFICANT CHANGE UP (ref 7–23)
CALCIUM SERPL-MCNC: 7.9 MG/DL — LOW (ref 8.5–10.1)
CHLORIDE SERPL-SCNC: 107 MMOL/L — SIGNIFICANT CHANGE UP (ref 96–108)
CO2 SERPL-SCNC: 25 MMOL/L — SIGNIFICANT CHANGE UP (ref 22–31)
CREAT SERPL-MCNC: 0.74 MG/DL — SIGNIFICANT CHANGE UP (ref 0.5–1.3)
EOSINOPHIL # BLD AUTO: 0.08 K/UL — SIGNIFICANT CHANGE UP (ref 0–0.5)
EOSINOPHIL NFR BLD AUTO: 1.3 % — SIGNIFICANT CHANGE UP (ref 0–6)
GLUCOSE SERPL-MCNC: 83 MG/DL — SIGNIFICANT CHANGE UP (ref 70–99)
HCT VFR BLD CALC: 29.5 % — LOW (ref 39–50)
HGB BLD-MCNC: 8.9 G/DL — LOW (ref 13–17)
IMM GRANULOCYTES NFR BLD AUTO: 1.2 % — SIGNIFICANT CHANGE UP (ref 0–1.5)
LYMPHOCYTES # BLD AUTO: 0.84 K/UL — LOW (ref 1–3.3)
LYMPHOCYTES # BLD AUTO: 14.1 % — SIGNIFICANT CHANGE UP (ref 13–44)
MCHC RBC-ENTMCNC: 26.3 PG — LOW (ref 27–34)
MCHC RBC-ENTMCNC: 30.2 GM/DL — LOW (ref 32–36)
MCV RBC AUTO: 87.3 FL — SIGNIFICANT CHANGE UP (ref 80–100)
MONOCYTES # BLD AUTO: 0.39 K/UL — SIGNIFICANT CHANGE UP (ref 0–0.9)
MONOCYTES NFR BLD AUTO: 6.6 % — SIGNIFICANT CHANGE UP (ref 2–14)
NEUTROPHILS # BLD AUTO: 4.54 K/UL — SIGNIFICANT CHANGE UP (ref 1.8–7.4)
NEUTROPHILS NFR BLD AUTO: 76.3 % — SIGNIFICANT CHANGE UP (ref 43–77)
NRBC # BLD: 0 /100 WBCS — SIGNIFICANT CHANGE UP (ref 0–0)
PLATELET # BLD AUTO: 144 K/UL — LOW (ref 150–400)
POTASSIUM SERPL-MCNC: 3.8 MMOL/L — SIGNIFICANT CHANGE UP (ref 3.5–5.3)
POTASSIUM SERPL-SCNC: 3.8 MMOL/L — SIGNIFICANT CHANGE UP (ref 3.5–5.3)
PROT SERPL-MCNC: 5.6 GM/DL — LOW (ref 6–8.3)
RBC # BLD: 3.38 M/UL — LOW (ref 4.2–5.8)
RBC # FLD: 17 % — HIGH (ref 10.3–14.5)
SODIUM SERPL-SCNC: 139 MMOL/L — SIGNIFICANT CHANGE UP (ref 135–145)
WBC # BLD: 5.95 K/UL — SIGNIFICANT CHANGE UP (ref 3.8–10.5)
WBC # FLD AUTO: 5.95 K/UL — SIGNIFICANT CHANGE UP (ref 3.8–10.5)

## 2018-11-25 PROCEDURE — 93010 ELECTROCARDIOGRAM REPORT: CPT

## 2018-11-25 RX ADMIN — Medication 650 MILLIGRAM(S): at 22:43

## 2018-11-25 RX ADMIN — OXYCODONE AND ACETAMINOPHEN 1 TABLET(S): 5; 325 TABLET ORAL at 12:04

## 2018-11-25 RX ADMIN — Medication 1 TABLET(S): at 11:09

## 2018-11-25 RX ADMIN — OXYCODONE AND ACETAMINOPHEN 1 TABLET(S): 5; 325 TABLET ORAL at 18:24

## 2018-11-25 RX ADMIN — OXYCODONE AND ACETAMINOPHEN 1 TABLET(S): 5; 325 TABLET ORAL at 11:55

## 2018-11-25 RX ADMIN — Medication 25 MILLIGRAM(S): at 06:27

## 2018-11-25 RX ADMIN — BICALUTAMIDE 50 MILLIGRAM(S): 50 TABLET, FILM COATED ORAL at 11:10

## 2018-11-25 RX ADMIN — Medication 1 GRAM(S): at 01:42

## 2018-11-25 RX ADMIN — MEROPENEM 100 MILLIGRAM(S): 1 INJECTION INTRAVENOUS at 12:18

## 2018-11-25 RX ADMIN — Medication 81 MILLIGRAM(S): at 11:09

## 2018-11-25 RX ADMIN — Medication 1 GRAM(S): at 06:27

## 2018-11-25 RX ADMIN — MEROPENEM 100 MILLIGRAM(S): 1 INJECTION INTRAVENOUS at 06:27

## 2018-11-25 RX ADMIN — PANTOPRAZOLE SODIUM 40 MILLIGRAM(S): 20 TABLET, DELAYED RELEASE ORAL at 08:44

## 2018-11-25 RX ADMIN — Medication 1 GRAM(S): at 11:55

## 2018-11-25 RX ADMIN — OXYCODONE AND ACETAMINOPHEN 1 TABLET(S): 5; 325 TABLET ORAL at 03:16

## 2018-11-25 RX ADMIN — Medication 1 GRAM(S): at 17:32

## 2018-11-25 RX ADMIN — MEROPENEM 100 MILLIGRAM(S): 1 INJECTION INTRAVENOUS at 22:49

## 2018-11-25 NOTE — PROGRESS NOTE ADULT - SUBJECTIVE AND OBJECTIVE BOX
· Subjective and Objective: 	  HOSPITALIST ATTENDING PROGRESS NOTE    Chart and meds reviewed.  Patient seen and examined.    HPI: 77 y/o male with h/o metastatic prostate CA with peritoneal carcinomatosis, AAA, CAD, GERD, HLD presents to the ED with dull left side chest pain. Pt was in  ED last night for same symptoms, decided to leave AMA. Troponin and EKG was negative. Pt had negative cardiac work up on last admission at  on 11/14 after he presented with similar chest pain. Pt was discharge home on PO antibiotic for recent diagnosis of UTI. Per patient, he received call to come back to ED for abnormal lab finding. CP resolved. No SOB or wheezing. Denies fever, chills, abd pain, N/V/D.   Urine culture from 11/16/18 showed ESBL E/coli resistant to PO antibiotics.   UA done in ED grossly [id number]/22/18 pt seen and examined, says his chest pain has resolved. Has a hx of prostate CA with peritoneal carcinomatosis. discussed case with  who previously called him for positive urine cx. Discussed with ID who will see the patient.    11/23/18 pt seen and examined, on meropenem, is unaware of the plan of the nephrostomy tube with his urologist.  11/24- overnight CP, EKG no change, troponin x1 wnl, currently still with stable dull left chest pain ,no SOB ,appears very comfortable, no palpitations, sinusrhythm 70 on tele, hemaodynamically stable,no nausea, no diaphoresis, reports this pain comes and goes and thinks it is gerd  11/25- remains afebrile no complaints    PHYSICAL EXAM:    HEENT:  pupils equal and reactive, EOMI, no oropharyngeal lesions, erythema, exudates, oral thrush    NECK:   supple, no carotid bruits, no palpable lymph nodes, no thyromegaly    CV:  +S1, +S2, regular, no murmurs or rubs    RESP:   lungs clear to auscultation bilaterally, no wheezing, rales, rhonchi, good air entry bilaterally    BREAST:  not examined    GI:  abdomen soft, non-tender, non-distended, normal BS, no bruits, no abdominal masses, no palpable masses    RECTAL:  not examinedGU:  not examined    MSK:   normal muscle tone, no atrophy, no rigidity, no contractions    EXT:   no clubbing, no cyanosis, no edema, no calf pain, swelling or erythema    VASCULAR:  pulses equal and symmetric in the upper and lower extremities    NEURO:  AAOX3, no focal neurological deficits, follows all commands, able to move extremities spontaneously    SKIN:  no ulcers, lesions or rashes      PHYSICAL EXAM:    Daily     Daily     ICU Vital Signs Last 24 Hrs  T(C): 36.9 (25 Nov 2018 11:18), Max: 37.1 (24 Nov 2018 20:59)  T(F): 98.4 (25 Nov 2018 11:18), Max: 98.8 (24 Nov 2018 20:59)  HR: 60 (25 Nov 2018 11:18) (60 - 83)  BP: 106/69 (25 Nov 2018 11:18) (93/52 - 118/75)  BP(mean): --  ABP: --  ABP(mean): --  RR: 18 (25 Nov 2018 11:18) (16 - 18)  SpO2: 94% (25 Nov 2018 11:18) (94% - 95%)                              8.9    5.95  )-----------( 144      ( 25 Nov 2018 07:39 )             29.5       CBC Full  -  ( 25 Nov 2018 07:39 )  WBC Count : 5.95 K/uL  Hemoglobin : 8.9 g/dL  Hematocrit : 29.5 %  Platelet Count - Automated : 144 K/uL  Mean Cell Volume : 87.3 fl  Mean Cell Hemoglobin : 26.3 pg  Mean Cell Hemoglobin Concentration : 30.2 gm/dL  Auto Neutrophil # : 4.54 K/uL  Auto Lymphocyte # : 0.84 K/uL  Auto Monocyte # : 0.39 K/uL  Auto Eosinophil # : 0.08 K/uL  Auto Basophil # : 0.03 K/uL  Auto Neutrophil % : 76.3 %  Auto Lymphocyte % : 14.1 %  Auto Monocyte % : 6.6 %  Auto Eosinophil % : 1.3 %  Auto Basophil % : 0.5 %      11-25    139  |  107  |  15  ----------------------------<  83  3.8   |  25  |  0.74    Ca    7.9<L>      25 Nov 2018 07:39    TPro  5.6<L>  /  Alb  2.0<L>  /  TBili  0.4  /  DBili  x   /  AST  37  /  ALT  14  /  AlkPhos  311<H>  11-25      LIVER FUNCTIONS - ( 25 Nov 2018 07:39 )  Alb: 2.0 g/dL / Pro: 5.6 gm/dL / ALK PHOS: 311 U/L / ALT: 14 U/L / AST: 37 U/L / GGT: x                 CARDIAC MARKERS ( 24 Nov 2018 11:58 )  <0.015 ng/mL / x     / x     / x     / x      CARDIAC MARKERS ( 23 Nov 2018 23:52 )  0.017 ng/mL / x     / x     / x     / x                    MEDICATIONS  (STANDING):  aspirin enteric coated 81 milliGRAM(s) Oral daily  atorvastatin 80 milliGRAM(s) Oral at bedtime  bicalutamide 50 milliGRAM(s) Oral daily  meropenem  IVPB 500 milliGRAM(s) IV Intermittent every 8 hours  metoprolol tartrate 25 milliGRAM(s) Oral three times a day  multivitamin 1 Tablet(s) Oral daily  pantoprazole    Tablet 40 milliGRAM(s) Oral before breakfast  sodium chloride 0.9%. 1000 milliLiter(s) (60 mL/Hr) IV Continuous <Continuous>  sodium chloride 0.9%. 1000 milliLiter(s) (100 mL/Hr) IV Continuous <Continuous>  sucralfate 1 Gram(s) Oral four times a day

## 2018-11-25 NOTE — PROGRESS NOTE ADULT - SUBJECTIVE AND OBJECTIVE BOX
Interval History:    MEDICATIONS  (STANDING):  aspirin enteric coated 81 milliGRAM(s) Oral daily  atorvastatin 80 milliGRAM(s) Oral at bedtime  bicalutamide 50 milliGRAM(s) Oral daily  meropenem  IVPB 500 milliGRAM(s) IV Intermittent every 8 hours  metoprolol tartrate 25 milliGRAM(s) Oral three times a day  multivitamin 1 Tablet(s) Oral daily  pantoprazole    Tablet 40 milliGRAM(s) Oral before breakfast  sodium chloride 0.9%. 1000 milliLiter(s) (60 mL/Hr) IV Continuous <Continuous>  sodium chloride 0.9%. 1000 milliLiter(s) (100 mL/Hr) IV Continuous <Continuous>  sucralfate 1 Gram(s) Oral four times a day    MEDICATIONS  (PRN):  acetaminophen   Tablet .. 650 milliGRAM(s) Oral every 6 hours PRN Temp greater or equal to 38C (100.4F), Mild Pain (1 - 3)  aluminum hydroxide/magnesium hydroxide/simethicone Suspension 30 milliLiter(s) Oral every 6 hours PRN Dyspepsia  oxyCODONE    5 mG/acetaminophen 325 mG 1 Tablet(s) Oral every 6 hours PRN Severe Pain (7 - 10)      Daily     Daily   BMI: 28.3 (11-21 @ 18:49)  Change in Weight:  Vital Signs Last 24 Hrs  T(C): 36.9 (25 Nov 2018 11:18), Max: 37.1 (24 Nov 2018 20:59)  T(F): 98.4 (25 Nov 2018 11:18), Max: 98.8 (24 Nov 2018 20:59)  HR: 60 (25 Nov 2018 11:18) (60 - 83)  BP: 106/69 (25 Nov 2018 11:18) (93/52 - 118/75)  BP(mean): --  RR: 18 (25 Nov 2018 11:18) (16 - 18)  SpO2: 94% (25 Nov 2018 11:18) (94% - 95%)  I&O's Detail    24 Nov 2018 07:01  -  25 Nov 2018 07:00  --------------------------------------------------------  IN:  Total IN: 0 mL    OUT:    Nephrostomy Tube: 375 mL    Nephrostomy Tube: 375 mL  Total OUT: 750 mL    Total NET: -750 mL      25 Nov 2018 07:01  -  25 Nov 2018 12:25  --------------------------------------------------------  IN:  Total IN: 0 mL    OUT:  Total OUT: 0 mL    Total NET: 0 mL          PHYSICAL EXAM  General:  Well developed, well nourished, alert and active, no pallor, NAD.  HEENT:    Normal appearance of conjunctiva, ears, nose, lips, oropharynx, and oral mucosa, anicteric.  Neck:  No masses, no asymmetry.  Lymph Nodes:  No lymphadenopathy.   Cardiovascular:  RRR normal S1/S2, no murmur.  Respiratory:  CTA B/L, normal respiratory effort.   Abdominal:   soft, no masses or tenderness, normoactive BS, NT/ND, no HSM.  Extremities:   No clubbing or cyanosis, normal capillary refill, no edema.   Skin:   No rash, jaundice, lesions, eczema.   Musculoskeletal:  No joint swelling, erythema or tenderness.   Other:     Lab Results:                        8.9    5.95  )-----------( 144      ( 25 Nov 2018 07:39 )             29.5     11-25    139  |  107  |  15  ----------------------------<  83  3.8   |  25  |  0.74    Ca    7.9<L>      25 Nov 2018 07:39    TPro  5.6<L>  /  Alb  2.0<L>  /  TBili  0.4  /  DBili  x   /  AST  37  /  ALT  14  /  AlkPhos  311<H>  11-25    LIVER FUNCTIONS - ( 25 Nov 2018 07:39 )  Alb: 2.0 g/dL / Pro: 5.6 gm/dL / ALK PHOS: 311 U/L / ALT: 14 U/L / AST: 37 U/L / GGT: x                 Stool Results:          RADIOLOGY RESULTS:    SURGICAL PATHOLOGY:

## 2018-11-25 NOTE — PROGRESS NOTE ADULT - ASSESSMENT
· Assessment		  79 y/o male with h/o metastatic prostate CA with peritoneal carcinomatosis, AAA, CAD, GERD, HLD presents to the ED with dull left side chest pain. Pt was in  ED last night for same symptoms, decided to leave AMA. Troponin and EKG was negative. Pt had negative cardiac work up on last admission at  on 11/14 after he presented with similar chest pain. Pt was discharge home on PO antibiotic for recent diagnosis of UTI. Per patient, he received call to come back to ED for abnormal lab finding. In the ED it was confirmed that he got the call for recent urine culture report.   Urine culture from 11/16/18 showed ESBL E/coli resistant to PO antibiotics. UA done in ED grossly positive. Got 1 dose of IV Ertapenem.   CP resolved. No SOB or wheezing. Denies fever, chills, abd pain, N/V/D.       # Chest pain constant, ACS ruled out: likely musculoskeletal vs GERD, appreciate cardio input  would also rule out PE   with CTA  (has risk factors for PE, although symptomatically and hemodynamically less likely)and IVF before and after IV contrast  tele sinus rhythm  ,no significant alarms  GI consult r/o GERD, continue PPI  if CTA and repeat Trop neg then d/c tele  -Continue with Aspirin/Statin and BB    # acute blood loss anemia/FOBT positive  GI consult to consider  /colonoscopy, early next week  continue PPI  denies karina or BRBPR    # ESBL UTI  -Continue with Ertapenem   -follow ID consult  - [facility]  after abx tx complete would follow with [facility] to [facility] replacement of nephrostomy tubes     #Prostate cancer on chemo  -F/u with oncology outpatient    # B/L Nephrostomy tube- [facility]- discussed with ID    DVT ppx  SCD for now due to gi blood loss

## 2018-11-25 NOTE — PROGRESS NOTE ADULT - SUBJECTIVE AND OBJECTIVE BOX
Date of service: 18 @ 15:43    Lying in bed in NAD  Sleepy  Comfortable  Denies pain    ROS: no fever or chills; no HA, no SOB or cough, no abdominal pain, no diarrhea or constipation; no legs pain, no rashes    MEDICATIONS  (STANDING):  aspirin enteric coated 81 milliGRAM(s) Oral daily  atorvastatin 80 milliGRAM(s) Oral at bedtime  bicalutamide 50 milliGRAM(s) Oral daily  meropenem  IVPB 500 milliGRAM(s) IV Intermittent every 8 hours  metoprolol tartrate 25 milliGRAM(s) Oral three times a day  multivitamin 1 Tablet(s) Oral daily  pantoprazole    Tablet 40 milliGRAM(s) Oral before breakfast  sodium chloride 0.9%. 1000 milliLiter(s) (60 mL/Hr) IV Continuous <Continuous>  sodium chloride 0.9%. 1000 milliLiter(s) (100 mL/Hr) IV Continuous <Continuous>  sucralfate 1 Gram(s) Oral four times a day      Vital Signs Last 24 Hrs  T(C): 36.9 (2018 11:18), Max: 37.1 (2018 20:59)  T(F): 98.4 (2018 11:18), Max: 98.8 (2018 20:59)  HR: 60 (2018 11:18) (60 - 83)  BP: 106/69 (2018 11:18) (93/52 - 118/75)  BP(mean): --  RR: 18 (2018 11:18) (16 - 18)  SpO2: 94% (2018 11:18) (94% - 95%)    Physical Exam:      Constitutional: frail looking  HEENT: NC/AT, EOMI, PERRLA, conjunctivae clear  Neck: supple; thyroid not palpable  Back: no tenderness  Respiratory: respiratory effort normal; decreased BS at bases  Cardiovascular: S1S2 regular, no murmurs  Abdomen: soft, not tender, not distended, positive BS  Genitourinary: no suprapubic tenderness; b/l tubes  Lymphatic: no LN palpable  Musculoskeletal: no muscle tenderness, no joint swelling or tenderness  Extremities: no pedal edema  Neurological/ Psychiatric: AxOx3, moving all extremities  Skin: no rashes; no palpable lesions    Labs: reviewed                        8.4    6.36  )-----------( 124      ( 2018 05:48 )             27.8     11-    143  |  108  |  17  ----------------------------<  101<H>  3.8   |  28  |  0.95    Ca    8.1<L>      2018 05:48    TPro  5.5<L>  /  Alb  2.1<L>  /  TBili  0.3  /  DBili  x   /  AST  30  /  ALT  11<L>  /  AlkPhos  313<H>                          8.9    7.66  )-----------( 116      ( 2018 06:29 )             28.9     -    144  |  109<H>  |  23  ----------------------------<  89  3.5   |  25  |  0.86    Ca    8.0<L>      2018 06:29  Phos  3.3     -  Mg     2.2         TPro  6.3  /  Alb  2.4<L>  /  TBili  0.6  /  DBili  x   /  AST  53<H>  /  ALT  13  /  AlkPhos  314<H>       LIVER FUNCTIONS - ( 2018 20:30 )  Alb: 2.4 g/dL / Pro: 6.3 gm/dL / ALK PHOS: 314 U/L / ALT: 13 U/L / AST: 53 U/L / GGT: x           Urinalysis Basic - ( 2018 20:30 )    Color: Yellow / Appearance: very cloudy / S.020 / pH: x  Gluc: x / Ketone: Trace  / Bili: Small / Urobili: 1 mg/dL   Blood: x / Protein: 500 mg/dL / Nitrite: Positive   Leuk Esterase: Moderate / RBC: 6-10 /HPF / WBC >50   Sq Epi: x / Non Sq Epi: Occasional / Bacteria: Many      Culture - Urine (collected 2018 04:45)  Source: .Urine None  Preliminary Report (2018 09:24):    >100,000 CFU/ml Escherichia coli    Culture - Urine (collected 2018 22:30)  Source: .Urine Nephrostomy - Left  Final Report (2018 10:56):    >100,000 CFU/ml Escherichia coli ESBL  Organism: Escherichia coli ESBL (2018 10:56)  Organism: Escherichia coli ESBL (2018 10:56)      -  Amikacin: S <=8      -  Amoxicillin/Clavulanic Acid: I 16/8      -  Ampicillin: R >16 These ampicillin results predict results for amoxicillin      -  Ampicillin/Sulbactam: R 16/8      -  Aztreonam: R 16      -  Cefazolin: R >16 For uncomplicated UTI with K. pneumoniae, E. coli, or P. mirablis: JERAMY <=16 is sensitive and JERAMY >=32 is resistant. This also predicts results for oral agents cefaclor, cefdinir, cefpodoxime, cefprozil, cefuroxime axetil, cephalexin and locarbef for uncomplicated UTI. Note that some isolates may be susceptible to these agents while testing resistant to cefazolin.      -  Cefepime: R 4      -  Cefoxitin: S <=4      -  Ceftriaxone: R >32 Enterobacter, Citrobacter, and Serratia may develop resistance during prolonged therapy      -  Ciprofloxacin: R >2      -  Ertapenem: S <=0.5      -  Gentamicin: S <=1      -  Imipenem: S <=1      -  Levofloxacin: R >4      -  Meropenem: S <=1      -  Nitrofurantoin: S <=32 Should not be used to treat pyelonephritis      -  Piperacillin/Tazobactam: R <=8      -  Tigecycline: S <=1      -  Tobramycin: R >8      -  Trimethoprim/Sulfamethoxazole: R >2/38      Method Type: JERAMY    Culture - Urine (11.21.18 @ 20:30)    Specimen Source: .Urine Clean Catch (Midstream)    Culture Results:   10,000 - 49,000 CFU/mL Enterococcus faecium        Radiology: all available radiological tests reviewed    Advanced directives addressed: full resuscitation

## 2018-11-25 NOTE — PROGRESS NOTE ADULT - ASSESSMENT
77 y/o male with h/o metastatic prostate CA with peritoneal carcinomatosis, b/l nephrostomy tubes, AAA, CAD, GERD, HLD was admitted on 11/21 for dull left side chest pain. Pt was in  ED the night PTA for same symptoms, decided to leave AMA. Troponin and EKG was negative. Pt had cardiac work up on last admission at  on 11/14 after he presented with similar chest pain. At that time urine cultures were collected and he was discharge home on PO antibiotics. Later, urine cultures were reported with ESBL E.coli and the patient was asked to return to the hospital. In ER, he was given meropenem.    1. UTI with ESBL E.coli. B/l nephrostomy tubes. Metastatic prostate Ca.  -improving slowly  -repeat urine culture shows small amount of EN faecium likely contaminant  -BC x 2 and urine c/s noted  -on meropenem 500 mg IV q8h # 4  -tolerating abx well so far; no side effects noted  -continue abx coverage  -monitor temps  -f/u CBC  -supportive care  2. Other issues:   -care per medicine

## 2018-11-26 LAB
ALBUMIN SERPL ELPH-MCNC: 2.2 G/DL — LOW (ref 3.3–5)
ALP SERPL-CCNC: 320 U/L — HIGH (ref 40–120)
ALT FLD-CCNC: 12 U/L — SIGNIFICANT CHANGE UP (ref 12–78)
ANION GAP SERPL CALC-SCNC: 7 MMOL/L — SIGNIFICANT CHANGE UP (ref 5–17)
AST SERPL-CCNC: 35 U/L — SIGNIFICANT CHANGE UP (ref 15–37)
BASOPHILS # BLD AUTO: 0.03 K/UL — SIGNIFICANT CHANGE UP (ref 0–0.2)
BASOPHILS NFR BLD AUTO: 0.5 % — SIGNIFICANT CHANGE UP (ref 0–2)
BILIRUB SERPL-MCNC: 0.3 MG/DL — SIGNIFICANT CHANGE UP (ref 0.2–1.2)
BUN SERPL-MCNC: 17 MG/DL — SIGNIFICANT CHANGE UP (ref 7–23)
CALCIUM SERPL-MCNC: 8.1 MG/DL — LOW (ref 8.5–10.1)
CHLORIDE SERPL-SCNC: 106 MMOL/L — SIGNIFICANT CHANGE UP (ref 96–108)
CO2 SERPL-SCNC: 26 MMOL/L — SIGNIFICANT CHANGE UP (ref 22–31)
CREAT SERPL-MCNC: 1.05 MG/DL — SIGNIFICANT CHANGE UP (ref 0.5–1.3)
EOSINOPHIL # BLD AUTO: 0.08 K/UL — SIGNIFICANT CHANGE UP (ref 0–0.5)
EOSINOPHIL NFR BLD AUTO: 1.3 % — SIGNIFICANT CHANGE UP (ref 0–6)
GLUCOSE SERPL-MCNC: 120 MG/DL — HIGH (ref 70–99)
HCT VFR BLD CALC: 31 % — LOW (ref 39–50)
HGB BLD-MCNC: 9.5 G/DL — LOW (ref 13–17)
IMM GRANULOCYTES NFR BLD AUTO: 0.9 % — SIGNIFICANT CHANGE UP (ref 0–1.5)
LYMPHOCYTES # BLD AUTO: 0.77 K/UL — LOW (ref 1–3.3)
LYMPHOCYTES # BLD AUTO: 12.1 % — LOW (ref 13–44)
MCHC RBC-ENTMCNC: 26.9 PG — LOW (ref 27–34)
MCHC RBC-ENTMCNC: 30.6 GM/DL — LOW (ref 32–36)
MCV RBC AUTO: 87.8 FL — SIGNIFICANT CHANGE UP (ref 80–100)
MONOCYTES # BLD AUTO: 0.46 K/UL — SIGNIFICANT CHANGE UP (ref 0–0.9)
MONOCYTES NFR BLD AUTO: 7.2 % — SIGNIFICANT CHANGE UP (ref 2–14)
NEUTROPHILS # BLD AUTO: 4.99 K/UL — SIGNIFICANT CHANGE UP (ref 1.8–7.4)
NEUTROPHILS NFR BLD AUTO: 78 % — HIGH (ref 43–77)
NRBC # BLD: 0 /100 WBCS — SIGNIFICANT CHANGE UP (ref 0–0)
PLATELET # BLD AUTO: 142 K/UL — LOW (ref 150–400)
POTASSIUM SERPL-MCNC: 3.7 MMOL/L — SIGNIFICANT CHANGE UP (ref 3.5–5.3)
POTASSIUM SERPL-SCNC: 3.7 MMOL/L — SIGNIFICANT CHANGE UP (ref 3.5–5.3)
PROT SERPL-MCNC: 5.6 GM/DL — LOW (ref 6–8.3)
RBC # BLD: 3.53 M/UL — LOW (ref 4.2–5.8)
RBC # FLD: 16.9 % — HIGH (ref 10.3–14.5)
SODIUM SERPL-SCNC: 139 MMOL/L — SIGNIFICANT CHANGE UP (ref 135–145)
WBC # BLD: 6.39 K/UL — SIGNIFICANT CHANGE UP (ref 3.8–10.5)
WBC # FLD AUTO: 6.39 K/UL — SIGNIFICANT CHANGE UP (ref 3.8–10.5)

## 2018-11-26 PROCEDURE — 93010 ELECTROCARDIOGRAM REPORT: CPT

## 2018-11-26 PROCEDURE — 76700 US EXAM ABDOM COMPLETE: CPT | Mod: 26

## 2018-11-26 RX ORDER — SOD SULF/SODIUM/NAHCO3/KCL/PEG
1 SOLUTION, RECONSTITUTED, ORAL ORAL ONCE
Qty: 0 | Refills: 0 | Status: COMPLETED | OUTPATIENT
Start: 2018-11-27 | End: 2018-11-27

## 2018-11-26 RX ORDER — SOD SULF/SODIUM/NAHCO3/KCL/PEG
1 SOLUTION, RECONSTITUTED, ORAL ORAL ONCE
Qty: 0 | Refills: 0 | Status: COMPLETED | OUTPATIENT
Start: 2018-11-26 | End: 2018-11-26

## 2018-11-26 RX ADMIN — Medication 1 GRAM(S): at 18:00

## 2018-11-26 RX ADMIN — Medication 1 GRAM(S): at 12:56

## 2018-11-26 RX ADMIN — MEROPENEM 100 MILLIGRAM(S): 1 INJECTION INTRAVENOUS at 14:55

## 2018-11-26 RX ADMIN — Medication 81 MILLIGRAM(S): at 12:56

## 2018-11-26 RX ADMIN — Medication 1 LITER(S): at 16:12

## 2018-11-26 RX ADMIN — Medication 30 MILLILITER(S): at 15:41

## 2018-11-26 RX ADMIN — MEROPENEM 100 MILLIGRAM(S): 1 INJECTION INTRAVENOUS at 06:08

## 2018-11-26 RX ADMIN — Medication 1 GRAM(S): at 06:08

## 2018-11-26 RX ADMIN — OXYCODONE AND ACETAMINOPHEN 1 TABLET(S): 5; 325 TABLET ORAL at 06:15

## 2018-11-26 RX ADMIN — Medication 1 TABLET(S): at 12:56

## 2018-11-26 RX ADMIN — Medication 1 GRAM(S): at 23:06

## 2018-11-26 RX ADMIN — BICALUTAMIDE 50 MILLIGRAM(S): 50 TABLET, FILM COATED ORAL at 12:56

## 2018-11-26 RX ADMIN — Medication 1 GRAM(S): at 00:56

## 2018-11-26 RX ADMIN — OXYCODONE AND ACETAMINOPHEN 1 TABLET(S): 5; 325 TABLET ORAL at 16:00

## 2018-11-26 RX ADMIN — Medication 30 MILLILITER(S): at 10:45

## 2018-11-26 RX ADMIN — ATORVASTATIN CALCIUM 80 MILLIGRAM(S): 80 TABLET, FILM COATED ORAL at 21:52

## 2018-11-26 RX ADMIN — MEROPENEM 100 MILLIGRAM(S): 1 INJECTION INTRAVENOUS at 21:53

## 2018-11-26 RX ADMIN — Medication 25 MILLIGRAM(S): at 15:50

## 2018-11-26 RX ADMIN — OXYCODONE AND ACETAMINOPHEN 1 TABLET(S): 5; 325 TABLET ORAL at 15:34

## 2018-11-26 RX ADMIN — PANTOPRAZOLE SODIUM 40 MILLIGRAM(S): 20 TABLET, DELAYED RELEASE ORAL at 06:08

## 2018-11-26 NOTE — PROGRESS NOTE ADULT - SUBJECTIVE AND OBJECTIVE BOX
Date of service: 18 @ 13:16    Lying in bed in NAD  Weak looking  Alert  Denies pain    ROS: no fever or chills; no HA, no SOB or cough, no abdominal pain, no diarrhea or constipation;  no rashes    MEDICATIONS  (STANDING):  aspirin enteric coated 81 milliGRAM(s) Oral daily  atorvastatin 80 milliGRAM(s) Oral at bedtime  bicalutamide 50 milliGRAM(s) Oral daily  meropenem  IVPB 500 milliGRAM(s) IV Intermittent every 8 hours  metoprolol tartrate 25 milliGRAM(s) Oral three times a day  multivitamin 1 Tablet(s) Oral daily  pantoprazole    Tablet 40 milliGRAM(s) Oral before breakfast  polyethylene glycol/electrolyte Solution 1 Liter(s) Oral once  sodium chloride 0.9%. 1000 milliLiter(s) (60 mL/Hr) IV Continuous <Continuous>  sodium chloride 0.9%. 1000 milliLiter(s) (100 mL/Hr) IV Continuous <Continuous>  sucralfate 1 Gram(s) Oral four times a day      Vital Signs Last 24 Hrs  T(C): 36.9 (2018 11:23), Max: 36.9 (2018 05:29)  T(F): 98.5 (2018 11:23), Max: 98.5 (2018 05:29)  HR: 64 (2018 11:23) (64 - 83)  BP: 102/59 (2018 11:23) (100/73 - 108/66)  BP(mean): --  RR: 17 (2018 11:23) (17 - 19)  SpO2: 98% (2018 11:23) (94% - 98%)    Physical Exam:      Constitutional: frail looking  HEENT: NC/AT, EOMI, PERRLA, conjunctivae clear  Neck: supple; thyroid not palpable  Back: no tenderness  Respiratory: respiratory effort normal; decreased BS at bases  Cardiovascular: S1S2 regular, no murmurs  Abdomen: soft, not tender, not distended, positive BS  Genitourinary: no suprapubic tenderness; b/l tubes  Lymphatic: no LN palpable  Musculoskeletal: no muscle tenderness, no joint swelling or tenderness  Extremities: no pedal edema  Neurological/ Psychiatric: AxOx3, moving all extremities  Skin: no rashes; no palpable lesions    Labs: reviewed                        8.4    6.36  )-----------( 124      ( 2018 05:48 )             27.8     -    143  |  108  |  17  ----------------------------<  101<H>  3.8   |  28  |  0.95    Ca    8.1<L>      2018 05:48    TPro  5.5<L>  /  Alb  2.1<L>  /  TBili  0.3  /  DBili  x   /  AST  30  /  ALT  11<L>  /  AlkPhos  313<H>                          8.9    7.66  )-----------( 116      ( 2018 06:29 )             28.9         144  |  109<H>  |  23  ----------------------------<  89  3.5   |  25  |  0.86    Ca    8.0<L>      2018 06:29  Phos  3.3       Mg     2.2         TPro  6.3  /  Alb  2.4<L>  /  TBili  0.6  /  DBili  x   /  AST  53<H>  /  ALT  13  /  AlkPhos  314<H>       LIVER FUNCTIONS - ( 2018 20:30 )  Alb: 2.4 g/dL / Pro: 6.3 gm/dL / ALK PHOS: 314 U/L / ALT: 13 U/L / AST: 53 U/L / GGT: x           Urinalysis Basic - ( 2018 20:30 )    Color: Yellow / Appearance: very cloudy / S.020 / pH: x  Gluc: x / Ketone: Trace  / Bili: Small / Urobili: 1 mg/dL   Blood: x / Protein: 500 mg/dL / Nitrite: Positive   Leuk Esterase: Moderate / RBC: 6-10 /HPF / WBC >50   Sq Epi: x / Non Sq Epi: Occasional / Bacteria: Many      Culture - Urine (collected 2018 04:45)  Source: .Urine None  Preliminary Report (2018 09:24):    >100,000 CFU/ml Escherichia coli    Culture - Urine (collected 2018 22:30)  Source: .Urine Nephrostomy - Left  Final Report (2018 10:56):    >100,000 CFU/ml Escherichia coli ESBL  Organism: Escherichia coli ESBL (2018 10:56)  Organism: Escherichia coli ESBL (2018 10:56)      -  Amikacin: S <=8      -  Amoxicillin/Clavulanic Acid: I 16/8      -  Ampicillin: R >16 These ampicillin results predict results for amoxicillin      -  Ampicillin/Sulbactam: R 16/8      -  Aztreonam: R 16      -  Cefazolin: R >16 For uncomplicated UTI with K. pneumoniae, E. coli, or P. mirablis: JERAMY <=16 is sensitive and JERAMY >=32 is resistant. This also predicts results for oral agents cefaclor, cefdinir, cefpodoxime, cefprozil, cefuroxime axetil, cephalexin and locarbef for uncomplicated UTI. Note that some isolates may be susceptible to these agents while testing resistant to cefazolin.      -  Cefepime: R 4      -  Cefoxitin: S <=4      -  Ceftriaxone: R >32 Enterobacter, Citrobacter, and Serratia may develop resistance during prolonged therapy      -  Ciprofloxacin: R >2      -  Ertapenem: S <=0.5      -  Gentamicin: S <=1      -  Imipenem: S <=1      -  Levofloxacin: R >4      -  Meropenem: S <=1      -  Nitrofurantoin: S <=32 Should not be used to treat pyelonephritis      -  Piperacillin/Tazobactam: R <=8      -  Tigecycline: S <=1      -  Tobramycin: R >8      -  Trimethoprim/Sulfamethoxazole: R >2/38      Method Type: JERAMY    Culture - Urine (11.21.18 @ 20:30)    Specimen Source: .Urine Clean Catch (Midstream)    Culture Results:   10,000 - 49,000 CFU/mL Enterococcus faecium        Radiology: all available radiological tests reviewed    Advanced directives addressed: full resuscitation

## 2018-11-26 NOTE — PROGRESS NOTE ADULT - SUBJECTIVE AND OBJECTIVE BOX
Interval History:    MEDICATIONS  (STANDING):  aspirin enteric coated 81 milliGRAM(s) Oral daily  atorvastatin 80 milliGRAM(s) Oral at bedtime  bicalutamide 50 milliGRAM(s) Oral daily  meropenem  IVPB 500 milliGRAM(s) IV Intermittent every 8 hours  metoprolol tartrate 25 milliGRAM(s) Oral three times a day  multivitamin 1 Tablet(s) Oral daily  pantoprazole    Tablet 40 milliGRAM(s) Oral before breakfast  sodium chloride 0.9%. 1000 milliLiter(s) (60 mL/Hr) IV Continuous <Continuous>  sodium chloride 0.9%. 1000 milliLiter(s) (100 mL/Hr) IV Continuous <Continuous>  sucralfate 1 Gram(s) Oral four times a day    MEDICATIONS  (PRN):  acetaminophen   Tablet .. 650 milliGRAM(s) Oral every 6 hours PRN Temp greater or equal to 38C (100.4F), Mild Pain (1 - 3)  aluminum hydroxide/magnesium hydroxide/simethicone Suspension 30 milliLiter(s) Oral every 6 hours PRN Dyspepsia  oxyCODONE    5 mG/acetaminophen 325 mG 1 Tablet(s) Oral every 6 hours PRN Severe Pain (7 - 10)      Daily     Daily   BMI: 28.3 (11-21 @ 18:49)  Change in Weight:  Vital Signs Last 24 Hrs  T(C): 36.9 (26 Nov 2018 05:29), Max: 36.9 (26 Nov 2018 05:29)  T(F): 98.5 (26 Nov 2018 05:29), Max: 98.5 (26 Nov 2018 05:29)  HR: 69 (26 Nov 2018 05:29) (69 - 83)  BP: 100/73 (26 Nov 2018 05:29) (100/73 - 108/66)  BP(mean): --  RR: 18 (25 Nov 2018 22:28) (18 - 19)  SpO2: 96% (26 Nov 2018 05:29) (94% - 96%)  I&O's Detail    25 Nov 2018 07:01  -  26 Nov 2018 07:00  --------------------------------------------------------  IN:  Total IN: 0 mL    OUT:    Nephrostomy Tube: 250 mL    Nephrostomy Tube: 300 mL  Total OUT: 550 mL    Total NET: -550 mL      26 Nov 2018 07:01  -  26 Nov 2018 11:21  --------------------------------------------------------  IN:  Total IN: 0 mL    OUT:    Nephrostomy Tube: 225 mL    Nephrostomy Tube: 75 mL  Total OUT: 300 mL    Total NET: -300 mL          PHYSICAL EXAM  General:  Well developed, well nourished, alert and active, no pallor, NAD.  HEENT:    Normal appearance of conjunctiva, ears, nose, lips, oropharynx, and oral mucosa, anicteric.  Neck:  No masses, no asymmetry.  Lymph Nodes:  No lymphadenopathy.   Cardiovascular:  RRR normal S1/S2, no murmur.  Respiratory:  CTA B/L, normal respiratory effort.   Abdominal:   soft, no masses or tenderness, normoactive BS, NT/ND, no HSM.  Extremities:   No clubbing or cyanosis, normal capillary refill, no edema.   Skin:   No rash, jaundice, lesions, eczema.   Musculoskeletal:  No joint swelling, erythema or tenderness.   Other:     Lab Results:                        9.5    6.39  )-----------( 142      ( 26 Nov 2018 06:39 )             31.0     11-26    139  |  106  |  17  ----------------------------<  120<H>  3.7   |  26  |  1.05    Ca    8.1<L>      26 Nov 2018 06:39    TPro  5.6<L>  /  Alb  2.2<L>  /  TBili  0.3  /  DBili  x   /  AST  35  /  ALT  12  /  AlkPhos  320<H>  11-26    LIVER FUNCTIONS - ( 26 Nov 2018 06:39 )  Alb: 2.2 g/dL / Pro: 5.6 gm/dL / ALK PHOS: 320 U/L / ALT: 12 U/L / AST: 35 U/L / GGT: x                 Stool Results:          RADIOLOGY RESULTS:    SURGICAL PATHOLOGY:

## 2018-11-26 NOTE — PHYSICAL THERAPY INITIAL EVALUATION ADULT - MODALITIES TREATMENT COMMENTS
Pt educated on therex and use of a RW. Pt stated he owns both SAC and RW and prefers the SAC. Pt is a high falls risk due to decreased safety awareness and use of AD. Pt returned to bed w/ bed alarm secured in care of ultrasound technician. Pt in NAD.

## 2018-11-26 NOTE — PHYSICAL THERAPY INITIAL EVALUATION ADULT - GENERAL OBSERVATIONS, REHAB EVAL
Pt received supine in bed w/ mild complaints of pain in L anterior chest. Pt was cooperative w/ PT. Pt received supine in bed w/ mild complaints of pain in L anterior chest. Pt was cooperative w/ PT. Contact Isolation precautions maintained t/o tx, + pt had 2 nephrostomy bags present, taped to gown during PT intervention.

## 2018-11-26 NOTE — PROGRESS NOTE ADULT - ASSESSMENT
77 y/o male with h/o metastatic prostate CA with peritoneal carcinomatosis, b/l nephrostomy tubes, AAA, CAD, GERD, HLD was admitted on 11/21 for dull left side chest pain. Pt was in  ED the night PTA for same symptoms, decided to leave AMA. Troponin and EKG was negative. Pt had cardiac work up on last admission at  on 11/14 after he presented with similar chest pain. At that time urine cultures were collected and he was discharge home on PO antibiotics. Later, urine cultures were reported with ESBL E.coli and the patient was asked to return to the hospital. In ER, he was given meropenem.    1. UTI with ESBL E.coli. B/l nephrostomy tubes. Metastatic prostate Ca.  -improving slowly  -repeat urine culture shows small amount of EN faecium likely contaminant  -BC x 2 and urine c/s noted  -on meropenem 500 mg IV q8h # 5  -tolerating abx well so far; no side effects noted  -plan to complete abx therapy in 1-2 days  -[facility]omies care  -monitor temps  -f/u CBC  -supportive care  2. Other issues:   -care per medicine

## 2018-11-26 NOTE — PHYSICAL THERAPY INITIAL EVALUATION ADULT - GAIT PATTERN USED, PT EVAL
3-point gait/ambulation w/ RW was unsafe due to poor coordination of RW while ambulating and pushing it outside his ANTONIO

## 2018-11-26 NOTE — PHYSICAL THERAPY INITIAL EVALUATION ADULT - ADDITIONAL COMMENTS
Pt reports using SAC to ambulate. Pt states he has a RW but does not use it. Pt states he lives w/ a 24/7  who does his grocery shopping, cooking and cleaning for him.

## 2018-11-26 NOTE — PHYSICAL THERAPY INITIAL EVALUATION ADULT - PERTINENT HX OF CURRENT PROBLEM, REHAB EVAL
79 y/o male with h/o metastatic prostate CA with peritoneal carcinomatosis, AAA, CAD, GERD, HLD presents to the ED 11/25/18 with dull left side chest pain.

## 2018-11-26 NOTE — PROGRESS NOTE ADULT - ASSESSMENT
· Assessment		  77 y/o male with h/o metastatic prostate CA with peritoneal carcinomatosis, AAA, CAD, GERD, HLD presents to the ED with dull left side chest pain. Pt was in  ED last night for same symptoms, decided to leave AMA. Troponin and EKG was negative. Pt had negative cardiac work up on last admission at  on 11/14 after he presented with similar chest pain. Pt was discharge home on PO antibiotic for recent diagnosis of UTI. Per patient, he received call to come back to ED for abnormal lab finding. In the ED it was confirmed that he got the call for recent urine culture report.   Urine culture from 11/16/18 showed ESBL E/coli resistant to PO antibiotics. UA done in ED grossly positive. Got 1 dose of IV Ertapenem.   CP resolved. No SOB or wheezing. Denies fever, chills, abd pain, N/V/D.       # Chest pain constant, ACS ruled out: likely musculoskeletal vs GERD, appreciate cardio input  would also rule out PE   with CTA  (has risk factors for PE, although symptomatically and hemodynamically less likely)and IVF before and after IV contrast  tele sinus rhythm  ,no significant alarms  GI consult r/o GERD, continue PPI  if CTA and repeat Trop neg then d/c tele  -Continue with Aspirin/Statin and BB    # acute blood loss anemia/FOBT positive  GI consult to consider  /colonoscopy, early next week  continue PPI  denies karina or BRBPR    # ESBL UTI  -Continue with meropenem #5,   -follow ID consult  - [facility] and [facility] name  after abx tx complete would follow with [facility]ist [facility] to consider replacement of nephrostomy tubes     #Prostate cancer on chemo  -F/u with oncology outpatient    # B/L Nephrostomy tube- plan [facility]-     DVT ppx  SCD for now due to gi blood loss

## 2018-11-26 NOTE — PROGRESS NOTE ADULT - SUBJECTIVE AND OBJECTIVE BOX
· Subjective and Objective: 	  HOSPITALIST ATTENDING PROGRESS NOTE    Chart and meds reviewed.  Patient seen and examined.    HPI: 79 y/o male with h/o metastatic prostate CA with peritoneal carcinomatosis, AAA, CAD, GERD, HLD presents to the ED with dull left side chest pain. Pt was in  ED last night for same symptoms, decided to leave AMA. Troponin and EKG was negative. Pt had negative cardiac work up on last admission at  on 11/14 after he presented with similar chest pain. Pt was discharge home on PO antibiotic for recent diagnosis of UTI. Per patient, he received call to come back to ED for abnormal lab finding. CP resolved. No SOB or wheezing. Denies fever, chills, abd pain, N/V/D.   Urine culture from 11/16/18 showed ESBL E/coli resistant to PO antibiotics.   UA done in ED grossly [id number] pt seen and examined, says his chest pain has resolved. Has a hx of prostate CA with peritoneal carcinomatosis. discussed case with  who previously called him for positive urine cx. Discussed with ID who will see the patient.    11/23/18 pt seen and examined, on meropenem, is unaware of the plan of the nephrostomy tube with his urologist.  11/24- overnight CP, EKG no change, troponin x1 wnl, currently still with stable dull left chest pain ,no SOB ,appears very comfortable, no palpitations, sinusrhythm 70 on tele, hemaodynamically stable,no nausea, no diaphoresis, reports this pain comes and goes and thinks it is gerd  11/26- remains afebrile no complaintsPHYSICAL EXAM:    HEENT:  pupils equal and reactive, EOMI, no oropharyngeal lesions, erythema, exudates, oral thrush    NECK:   supple, no carotid bruits, no palpable lymph nodes, no thyromegaly    CV:  +S1, +S2, regular, no murmurs or rubs    RESP:   lungs clear to auscultation bilaterally, no wheezing, rales, rhonchi, good air entry bilaterally    BREAST:  not examined    GI:  abdomen soft, non-tender, non-distended, normal BS, no bruits, no abdominal masses, no palpable massesRECTAL:  not examinedGU:  not examined    MSK:   normal muscle tone, no atrophy, no rigidity, no contractions    EXT:   no clubbing, no cyanosis, no edema, no calf pain, swelling or erythema    VASCULAR:  pulses equal and symmetric in the upper and lower extremities    NEURO:  AAOX3, no focal neurological deficits, follows all commands, able to move extremities spontaneously    SKIN:  no ulcers, lesions or rashes      PHYSICAL EXAM:    Daily     Daily     ICU Vital Signs Last 24 Hrs  T(C): 36.9 (26 Nov 2018 11:23), Max: 36.9 (26 Nov 2018 05:29)  T(F): 98.5 (26 Nov 2018 11:23), Max: 98.5 (26 Nov 2018 05:29)  HR: 93 (26 Nov 2018 15:47) (64 - 93)  BP: 122/77 (26 Nov 2018 15:47) (100/73 - 122/77)  BP(mean): --  ABP: --  ABP(mean): --  RR: 17 (26 Nov 2018 11:23) (17 - 19)  SpO2: 95% (26 Nov 2018 15:47) (94% - 98%)                                9.5    6.39  )-----------( 142      ( 26 Nov 2018 06:39 )             31.0       CBC Full  -  ( 26 Nov 2018 06:39 )  WBC Count : 6.39 K/uL  Hemoglobin : 9.5 g/dL  Hematocrit : 31.0 %  Platelet Count - Automated : 142 K/uL  Mean Cell Volume : 87.8 fl  Mean Cell Hemoglobin : 26.9 pg  Mean Cell Hemoglobin Concentration : 30.6 gm/dL  Auto Neutrophil # : 4.99 K/uL  Auto Lymphocyte # : 0.77 K/uL  Auto Monocyte # : 0.46 K/uL  Auto Eosinophil # : 0.08 K/uL  Auto Basophil # : 0.03 K/uL  Auto Neutrophil % : 78.0 %  Auto Lymphocyte % : 12.1 %  Auto Monocyte % : 7.2 %  Auto Eosinophil % : 1.3 %  Auto Basophil % : 0.5 %      11-26    139  |  106  |  17  ----------------------------<  120<H>  3.7   |  26  |  1.05    Ca    8.1<L>      26 Nov 2018 06:39    TPro  5.6<L>  /  Alb  2.2<L>  /  TBili  0.3  /  DBili  x   /  AST  35  /  ALT  12  /  AlkPhos  320<H>  11-26      LIVER FUNCTIONS - ( 26 Nov 2018 06:39 )  Alb: 2.2 g/dL / Pro: 5.6 gm/dL / ALK PHOS: 320 U/L / ALT: 12 U/L / AST: 35 U/L / GGT: x                               MEDICATIONS  (STANDING):  aspirin enteric coated 81 milliGRAM(s) Oral daily  atorvastatin 80 milliGRAM(s) Oral at bedtime  bicalutamide 50 milliGRAM(s) Oral daily  meropenem  IVPB 500 milliGRAM(s) IV Intermittent every 8 hours  metoprolol tartrate 25 milliGRAM(s) Oral three times a day  multivitamin 1 Tablet(s) Oral daily  pantoprazole    Tablet 40 milliGRAM(s) Oral before breakfast  sodium chloride 0.9%. 1000 milliLiter(s) (60 mL/Hr) IV Continuous <Continuous>  sodium chloride 0.9%. 1000 milliLiter(s) (100 mL/Hr) IV Continuous <Continuous>  sucralfate 1 Gram(s) Oral four times a day

## 2018-11-27 DIAGNOSIS — R07.9 CHEST PAIN, UNSPECIFIED: ICD-10-CM

## 2018-11-27 DIAGNOSIS — Z88.2 ALLERGY STATUS TO SULFONAMIDES: ICD-10-CM

## 2018-11-27 DIAGNOSIS — Z87.891 PERSONAL HISTORY OF NICOTINE DEPENDENCE: ICD-10-CM

## 2018-11-27 DIAGNOSIS — I25.10 ATHEROSCLEROTIC HEART DISEASE OF NATIVE CORONARY ARTERY WITHOUT ANGINA PECTORIS: ICD-10-CM

## 2018-11-27 DIAGNOSIS — Z88.5 ALLERGY STATUS TO NARCOTIC AGENT: ICD-10-CM

## 2018-11-27 DIAGNOSIS — Z53.21 PROCEDURE AND TREATMENT NOT CARRIED OUT DUE TO PATIENT LEAVING PRIOR TO BEING SEEN BY HEALTH CARE PROVIDER: ICD-10-CM

## 2018-11-27 DIAGNOSIS — Z79.82 LONG TERM (CURRENT) USE OF ASPIRIN: ICD-10-CM

## 2018-11-27 DIAGNOSIS — E78.5 HYPERLIPIDEMIA, UNSPECIFIED: ICD-10-CM

## 2018-11-27 DIAGNOSIS — Z66 DO NOT RESUSCITATE: ICD-10-CM

## 2018-11-27 DIAGNOSIS — Z79.899 OTHER LONG TERM (CURRENT) DRUG THERAPY: ICD-10-CM

## 2018-11-27 DIAGNOSIS — Z85.46 PERSONAL HISTORY OF MALIGNANT NEOPLASM OF PROSTATE: ICD-10-CM

## 2018-11-27 DIAGNOSIS — K21.9 GASTRO-ESOPHAGEAL REFLUX DISEASE WITHOUT ESOPHAGITIS: ICD-10-CM

## 2018-11-27 LAB
ANION GAP SERPL CALC-SCNC: 9 MMOL/L — SIGNIFICANT CHANGE UP (ref 5–17)
BASOPHILS # BLD AUTO: 0.02 K/UL — SIGNIFICANT CHANGE UP (ref 0–0.2)
BASOPHILS NFR BLD AUTO: 0.4 % — SIGNIFICANT CHANGE UP (ref 0–2)
BUN SERPL-MCNC: 16 MG/DL — SIGNIFICANT CHANGE UP (ref 7–23)
CALCIUM SERPL-MCNC: 8.1 MG/DL — LOW (ref 8.5–10.1)
CHLORIDE SERPL-SCNC: 105 MMOL/L — SIGNIFICANT CHANGE UP (ref 96–108)
CO2 SERPL-SCNC: 29 MMOL/L — SIGNIFICANT CHANGE UP (ref 22–31)
CREAT SERPL-MCNC: 0.88 MG/DL — SIGNIFICANT CHANGE UP (ref 0.5–1.3)
CULTURE RESULTS: SIGNIFICANT CHANGE UP
EOSINOPHIL # BLD AUTO: 0.09 K/UL — SIGNIFICANT CHANGE UP (ref 0–0.5)
EOSINOPHIL NFR BLD AUTO: 1.6 % — SIGNIFICANT CHANGE UP (ref 0–6)
GLUCOSE SERPL-MCNC: 81 MG/DL — SIGNIFICANT CHANGE UP (ref 70–99)
HCT VFR BLD CALC: 30.3 % — LOW (ref 39–50)
HGB BLD-MCNC: 9.3 G/DL — LOW (ref 13–17)
IMM GRANULOCYTES NFR BLD AUTO: 1.1 % — SIGNIFICANT CHANGE UP (ref 0–1.5)
LYMPHOCYTES # BLD AUTO: 0.93 K/UL — LOW (ref 1–3.3)
LYMPHOCYTES # BLD AUTO: 16.6 % — SIGNIFICANT CHANGE UP (ref 13–44)
MCHC RBC-ENTMCNC: 26.9 PG — LOW (ref 27–34)
MCHC RBC-ENTMCNC: 30.7 GM/DL — LOW (ref 32–36)
MCV RBC AUTO: 87.6 FL — SIGNIFICANT CHANGE UP (ref 80–100)
MONOCYTES # BLD AUTO: 0.45 K/UL — SIGNIFICANT CHANGE UP (ref 0–0.9)
MONOCYTES NFR BLD AUTO: 8 % — SIGNIFICANT CHANGE UP (ref 2–14)
NEUTROPHILS # BLD AUTO: 4.06 K/UL — SIGNIFICANT CHANGE UP (ref 1.8–7.4)
NEUTROPHILS NFR BLD AUTO: 72.3 % — SIGNIFICANT CHANGE UP (ref 43–77)
NRBC # BLD: 0 /100 WBCS — SIGNIFICANT CHANGE UP (ref 0–0)
PLATELET # BLD AUTO: 143 K/UL — LOW (ref 150–400)
POTASSIUM SERPL-MCNC: 3.7 MMOL/L — SIGNIFICANT CHANGE UP (ref 3.5–5.3)
POTASSIUM SERPL-SCNC: 3.7 MMOL/L — SIGNIFICANT CHANGE UP (ref 3.5–5.3)
RBC # BLD: 3.46 M/UL — LOW (ref 4.2–5.8)
RBC # FLD: 16.7 % — HIGH (ref 10.3–14.5)
SODIUM SERPL-SCNC: 143 MMOL/L — SIGNIFICANT CHANGE UP (ref 135–145)
SPECIMEN SOURCE: SIGNIFICANT CHANGE UP
WBC # BLD: 5.61 K/UL — SIGNIFICANT CHANGE UP (ref 3.8–10.5)
WBC # FLD AUTO: 5.61 K/UL — SIGNIFICANT CHANGE UP (ref 3.8–10.5)

## 2018-11-27 RX ORDER — ASPIRIN/CALCIUM CARB/MAGNESIUM 324 MG
1 TABLET ORAL
Qty: 0 | Refills: 0 | COMMUNITY

## 2018-11-27 RX ORDER — METOPROLOL TARTRATE 50 MG
0.5 TABLET ORAL
Qty: 0 | Refills: 0 | COMMUNITY

## 2018-11-27 RX ORDER — METOPROLOL TARTRATE 50 MG
1 TABLET ORAL
Qty: 0 | Refills: 0 | COMMUNITY
Start: 2018-11-27

## 2018-11-27 RX ORDER — ASPIRIN/CALCIUM CARB/MAGNESIUM 324 MG
1 TABLET ORAL
Qty: 0 | Refills: 0 | COMMUNITY
Start: 2018-11-27

## 2018-11-27 RX ADMIN — Medication 1 GRAM(S): at 06:38

## 2018-11-27 RX ADMIN — Medication 30 MILLILITER(S): at 02:33

## 2018-11-27 RX ADMIN — MEROPENEM 100 MILLIGRAM(S): 1 INJECTION INTRAVENOUS at 06:38

## 2018-11-27 RX ADMIN — ATORVASTATIN CALCIUM 80 MILLIGRAM(S): 80 TABLET, FILM COATED ORAL at 21:57

## 2018-11-27 RX ADMIN — MEROPENEM 100 MILLIGRAM(S): 1 INJECTION INTRAVENOUS at 21:57

## 2018-11-27 RX ADMIN — Medication 25 MILLIGRAM(S): at 13:05

## 2018-11-27 RX ADMIN — Medication 1 GRAM(S): at 23:39

## 2018-11-27 RX ADMIN — Medication 1 LITER(S): at 06:37

## 2018-11-27 RX ADMIN — MEROPENEM 100 MILLIGRAM(S): 1 INJECTION INTRAVENOUS at 13:05

## 2018-11-27 RX ADMIN — Medication 1 GRAM(S): at 13:05

## 2018-11-27 RX ADMIN — Medication 81 MILLIGRAM(S): at 13:05

## 2018-11-27 RX ADMIN — OXYCODONE AND ACETAMINOPHEN 1 TABLET(S): 5; 325 TABLET ORAL at 02:25

## 2018-11-27 RX ADMIN — OXYCODONE AND ACETAMINOPHEN 1 TABLET(S): 5; 325 TABLET ORAL at 14:30

## 2018-11-27 RX ADMIN — Medication 1 TABLET(S): at 13:05

## 2018-11-27 RX ADMIN — OXYCODONE AND ACETAMINOPHEN 1 TABLET(S): 5; 325 TABLET ORAL at 06:44

## 2018-11-27 RX ADMIN — PANTOPRAZOLE SODIUM 40 MILLIGRAM(S): 20 TABLET, DELAYED RELEASE ORAL at 10:27

## 2018-11-27 RX ADMIN — Medication 1 GRAM(S): at 17:40

## 2018-11-27 RX ADMIN — OXYCODONE AND ACETAMINOPHEN 1 TABLET(S): 5; 325 TABLET ORAL at 15:10

## 2018-11-27 RX ADMIN — BICALUTAMIDE 50 MILLIGRAM(S): 50 TABLET, FILM COATED ORAL at 14:52

## 2018-11-27 NOTE — CONSULT NOTE ADULT - REASON FOR ADMISSION
Call back for abnormal lab result
Chest pain

## 2018-11-27 NOTE — PROGRESS NOTE ADULT - SUBJECTIVE AND OBJECTIVE BOX
Interval History:    MEDICATIONS  (STANDING):  aspirin enteric coated 81 milliGRAM(s) Oral daily  atorvastatin 80 milliGRAM(s) Oral at bedtime  bicalutamide 50 milliGRAM(s) Oral daily  meropenem  IVPB 500 milliGRAM(s) IV Intermittent every 8 hours  metoprolol tartrate 25 milliGRAM(s) Oral three times a day  multivitamin 1 Tablet(s) Oral daily  pantoprazole    Tablet 40 milliGRAM(s) Oral before breakfast  sodium chloride 0.9%. 1000 milliLiter(s) (60 mL/Hr) IV Continuous <Continuous>  sodium chloride 0.9%. 1000 milliLiter(s) (100 mL/Hr) IV Continuous <Continuous>  sucralfate 1 Gram(s) Oral four times a day    MEDICATIONS  (PRN):  acetaminophen   Tablet .. 650 milliGRAM(s) Oral every 6 hours PRN Temp greater or equal to 38C (100.4F), Mild Pain (1 - 3)  aluminum hydroxide/magnesium hydroxide/simethicone Suspension 30 milliLiter(s) Oral every 6 hours PRN Dyspepsia  oxyCODONE    5 mG/acetaminophen 325 mG 1 Tablet(s) Oral every 6 hours PRN Severe Pain (7 - 10)      Daily     Daily   BMI: 28.3 (11-21 @ 18:49)  Change in Weight:  Vital Signs Last 24 Hrs  T(C): 36.8 (27 Nov 2018 05:17), Max: 36.9 (26 Nov 2018 11:23)  T(F): 98.3 (27 Nov 2018 05:17), Max: 98.5 (26 Nov 2018 11:23)  HR: 69 (27 Nov 2018 05:17) (64 - 93)  BP: 102/66 (27 Nov 2018 05:17) (90/60 - 122/77)  BP(mean): --  RR: 17 (26 Nov 2018 11:23) (17 - 17)  SpO2: 95% (27 Nov 2018 05:17) (95% - 98%)  I&O's Detail    26 Nov 2018 07:01  -  27 Nov 2018 07:00  --------------------------------------------------------  IN:  Total IN: 0 mL    OUT:    Nephrostomy Tube: 395 mL    Nephrostomy Tube: 590 mL  Total OUT: 985 mL    Total NET: -985 mL          PHYSICAL EXAM  General:  Well developed, well nourished, alert and active, no pallor, NAD.  HEENT:    Normal appearance of conjunctiva, ears, nose, lips, oropharynx, and oral mucosa, anicteric.  Neck:  No masses, no asymmetry.  Lymph Nodes:  No lymphadenopathy.   Cardiovascular:  RRR normal S1/S2, no murmur.  Respiratory:  CTA B/L, normal respiratory effort.   Abdominal:   soft, no masses or tenderness, normoactive BS, NT/ND, no HSM.  Extremities:   No clubbing or cyanosis, normal capillary refill, no edema.   Skin:   No rash, jaundice, lesions, eczema.   Musculoskeletal:  No joint swelling, erythema or tenderness.   Other:     Lab Results:                        9.3    5.61  )-----------( 143      ( 27 Nov 2018 06:28 )             30.3     11-27    143  |  105  |  16  ----------------------------<  81  3.7   |  29  |  0.88    Ca    8.1<L>      27 Nov 2018 06:28    TPro  5.6<L>  /  Alb  2.2<L>  /  TBili  0.3  /  DBili  x   /  AST  35  /  ALT  12  /  AlkPhos  320<H>  11-26    LIVER FUNCTIONS - ( 26 Nov 2018 06:39 )  Alb: 2.2 g/dL / Pro: 5.6 gm/dL / ALK PHOS: 320 U/L / ALT: 12 U/L / AST: 35 U/L / GGT: x                 Stool Results:          RADIOLOGY RESULTS:    SURGICAL PATHOLOGY:

## 2018-11-27 NOTE — PHARMACOTHERAPY INTERVENTION NOTE - COMMENTS
Educated patient on oral chemotherapy bicalutamide, which included mechanism of action, storage and handling, administration, and possible adverse effects. Patient education handout was provided to the patient.

## 2018-11-27 NOTE — PROGRESS NOTE ADULT - ASSESSMENT
· Assessment		  79 y/o male with h/o metastatic prostate CA with peritoneal carcinomatosis, AAA, CAD, GERD, HLD presents to the ED with dull left side chest pain. Pt was in  ED last night for same symptoms, decided to leave AMA. Troponin and EKG was negative. Pt had negative cardiac work up on last admission at  on 11/14 after he presented with similar chest pain. Pt was discharge home on PO antibiotic for recent diagnosis of UTI. Per patient, he received call to come back to ED for abnormal lab finding. In the ED it was confirmed that he got the call for recent urine culture report.   Urine culture from 11/16/18 showed ESBL E/coli resistant to PO antibiotics. UA done in ED grossly positive. Got 1 dose of IV Ertapenem.   CP resolved. No SOB or wheezing. Denies fever, chills, abd pain, N/V/D.       # Chest pain constant, ACS ruled out: likely musculoskeletal vs GERD, appreciate cardio input   ruled out PE   with CTA   tele sinus rhythm  ,no significant alarms  GI consult r/o GERD, continue PPI  -Continue with Aspirin/Statin and BB    # acute blood loss anemia/FOBT positive  patient refused colonoscopy and bowel prep which was planned for 11/27  plan for outpatient GI w/u  with dr ramos  continue PPI  denies karina or BRBPR    # ESBL UTI/hx  B/L nephrostomy tubes  #/Left nephrostomy tube partially out on 11/28 with surrounding stoma mild redness  plan for B/L nephrostomy tube replacemnt 11/28 by IR Dr echeverria ( I discussed with IR)  -Continue with meropenem #6, last dose will be on 11/28  -follow ID consult-  - [facility] and does not know the name  f/u [facility]rology [facility] after [facility]    #Prostate cancer on chemo/never complaint   I spoke with his PMD dr best  [phone]  his oncologist [facility] is Dr eyad zavala [phone] ext [phone]- unable to reach oncology  patient would need to f/u with his oncologist [facility]  -F/u with oncology outpatient      DVT ppx  SCD for now due to gi blood loss    unable to reach family/daughter elan, several voice messages and also called friend yoni left voice message   disposition  plan for d/c to rehab after completion of abx per ID and after b/l nephrostomy tube replaced   plan to f/u with his PMD, urology, [facility]ology at [facility] and GI dr ramos for outpatient colonoscopy

## 2018-11-27 NOTE — PROGRESS NOTE ADULT - SUBJECTIVE AND OBJECTIVE BOX
· Subjective and Objective: 	  HOSPITALIST ATTENDING PROGRESS NOTE    Chart and meds reviewed.  Patient seen and examined.    HPI: 79 y/o male with h/o metastatic prostate CA with peritoneal carcinomatosis, AAA, CAD, GERD, HLD presents to the ED with dull left side chest pain. Pt was in  ED last night for same symptoms, decided to leave AMA. Troponin and EKG was negative. Pt had negative cardiac work up on last admission at  on 11/14 after he presented with similar chest pain. Pt was discharge home on PO antibiotic for recent diagnosis of UTI. Per patient, he received call to come back to ED for abnormal lab finding. CP resolved. No SOB or wheezing. Denies fever, chills, abd pain, N/V/D.   Urine culture from 11/16/18 showed ESBL E/coli resistant to PO antibiotics.   UA done in ED grossly [id number] pt seen and examined, says his chest pain has resolved. Has a hx of prostate CA with peritoneal carcinomatosis. discussed case with  who previously called him for positive urine cx. Discussed with ID who will see the patient.    11/23/18 pt seen and examined, on meropenem, is unaware of the plan of the nephrostomy tube with his urologist.  11/24- overnight CP, EKG no change, troponin x1 wnl, currently still with stable dull left chest pain ,no SOB ,appears very comfortable, no palpitations, sinusrhythm 70 on tele, hemaodynamically stable,no nausea, no diaphoresis, reports this pain comes and goes and thinks it is gerd  11/27- remains afebrile left nepghrostomy tube partially out with surrounding skin mild erythema     PHYSICAL EXAM:  HEENT:  pupils equal and reactive, EOMI, no oropharyngeal lesions, erythema, exudates, oral thrush    NECK:   supple, no carotid bruits, no palpable lymph nodes, no thyromegaly    CV:  +S1, +S2, regular, no murmurs or rubs    RESP:   lungs clear to auscultation bilaterally, no wheezing, rales, rhonchi, good air entry bilaterally    BREAST:  not examined    GI:  abdomen soft, non-tender, non-distended, normal BS, no bruits, no abdominal masses, no palpable massesRECTAL:  not examinedGU:  not examined    MSK:   normal muscle tone, no atrophy, no rigidity, no contractions  EXT:   no clubbing, no cyanosis, no edema, no calf pain, swelling or erythema    VASCULAR:  pulses equal and symmetric in the upper and lower extremities    NEURO:  AAOX3, no focal neurological deficits, follows all commands, able to move extremities spontaneously    PHYSICAL EXAM:    Daily     Daily     ICU Vital Signs Last 24 Hrs  T(C): 36.7 (27 Nov 2018 17:33), Max: 36.8 (27 Nov 2018 05:17)  T(F): 98.1 (27 Nov 2018 17:33), Max: 98.3 (27 Nov 2018 05:17)  HR: 77 (27 Nov 2018 17:33) (69 - 86)  BP: 116/71 (27 Nov 2018 17:33) (90/60 - 116/71)  BP(mean): --  ABP: --  ABP(mean): --  RR: 18 (27 Nov 2018 17:33) (17 - 18)  SpO2: 95% (27 Nov 2018 17:33) (95% - 95%)                              9.3    5.61  )-----------( 143      ( 27 Nov 2018 06:28 )             30.3       CBC Full  -  ( 27 Nov 2018 06:28 )  WBC Count : 5.61 K/uL  Hemoglobin : 9.3 g/dL  Hematocrit : 30.3 %  Platelet Count - Automated : 143 K/uL  Mean Cell Volume : 87.6 fl  Mean Cell Hemoglobin : 26.9 pg  Mean Cell Hemoglobin Concentration : 30.7 gm/dL  Auto Neutrophil # : 4.06 K/uL  Auto Lymphocyte # : 0.93 K/uL  Auto Monocyte # : 0.45 K/uL  Auto Eosinophil # : 0.09 K/uL  Auto Basophil # : 0.02 K/uL  Auto Neutrophil % : 72.3 %  Auto Lymphocyte % : 16.6 %  Auto Monocyte % : 8.0 %  Auto Eosinophil % : 1.6 %  Auto Basophil % : 0.4 %      11-27    143  |  105  |  16  ----------------------------<  81  3.7   |  29  |  0.88    Ca    8.1<L>      27 Nov 2018 06:28    TPro  5.6<L>  /  Alb  2.2<L>  /  TBili  0.3  /  DBili  x   /  AST  35  /  ALT  12  /  AlkPhos  320<H>  11-26      LIVER FUNCTIONS - ( 26 Nov 2018 06:39 )  Alb: 2.2 g/dL / Pro: 5.6 gm/dL / ALK PHOS: 320 U/L / ALT: 12 U/L / AST: 35 U/L / GGT: x                               MEDICATIONS  (STANDING):  aspirin enteric coated 81 milliGRAM(s) Oral daily  atorvastatin 80 milliGRAM(s) Oral at bedtime  bicalutamide 50 milliGRAM(s) Oral daily  meropenem  IVPB 500 milliGRAM(s) IV Intermittent every 8 hours  metoprolol tartrate 25 milliGRAM(s) Oral three times a day  multivitamin 1 Tablet(s) Oral daily  pantoprazole    Tablet 40 milliGRAM(s) Oral before breakfast  sodium chloride 0.9%. 1000 milliLiter(s) (60 mL/Hr) IV Continuous <Continuous>  sodium chloride 0.9%. 1000 milliLiter(s) (100 mL/Hr) IV Continuous <Continuous>  sucralfate 1 Gram(s) Oral four times a day          SKIN:  no ulcers, lesions or rashes

## 2018-11-27 NOTE — CONSULT NOTE ADULT - SUBJECTIVE AND OBJECTIVE BOX
HEMATOLOGY ONCOLOGY CONSULT     Patient is a 78y old  Male who presents with a chief complaint of Call back for abnormal lab result (2018 08:05)      HPI:  79 y/o male with h/o metastatic prostate CA with peritoneal carcinomatosis, AAA, CAD, GERD, HLD presents to the ED with dull left side chest pain. Pt was in  ED last night for same symptoms, decided to leave AMA. Troponin and EKG was negative. Pt had negative cardiac work up on last admission at  on  after he presented with similar chest pain. Pt was discharge home on PO antibiotic for recent diagnosis of UTI. Per patient, he received call to come back to ED for abnormal lab finding. CP resolved. No SOB or wheezing. Denies fever, chills, abd pain, N/V/D.   Urine culture from 18 showed ESBL E/coli resistant to PO antibiotics.   UA done in ED grossly positive   Oncology has been consulted for recent CT imaging finding of extensive peritoneal carcinomatosis.   Hospital course has been complicated by ESBL UTI currently on carbapenem with ID following, Chest pain and Acute blood loss anemia presently planned to undergo outpatient endoscopy as hemodynamics have remained preserved.     PSH: as above and cholecystectomy, right cataract, THR, nephrostomy tube  Family Hx: Father-stroke; Mother-stroke,  age 80s (2018 00:16)       ROS:  Negative except for:    PAST MEDICAL & SURGICAL HISTORY:  CAD (coronary artery disease)  HLD (hyperlipidemia)  GERD (gastroesophageal reflux disease)  Colon obstruction  Prostate CA  Gallstones  Neurogenic bladder  Carotid stenosis: right and left  AAA (abdominal aortic aneurysm): 2010  Left rib fracture  S/P cataract surgery, right  H/O hemicolectomy  History of intestinal surgery  History of cholecystectomy  THR (Total Hip Replacement)      SOCIAL HISTORY:    FAMILY HISTORY:  No pertinent family history in first degree relatives      MEDICATIONS  (STANDING):  aspirin enteric coated 81 milliGRAM(s) Oral daily  atorvastatin 80 milliGRAM(s) Oral at bedtime  bicalutamide 50 milliGRAM(s) Oral daily  meropenem  IVPB 500 milliGRAM(s) IV Intermittent every 8 hours  metoprolol tartrate 25 milliGRAM(s) Oral three times a day  multivitamin 1 Tablet(s) Oral daily  pantoprazole    Tablet 40 milliGRAM(s) Oral before breakfast  sodium chloride 0.9%. 1000 milliLiter(s) (60 mL/Hr) IV Continuous <Continuous>  sodium chloride 0.9%. 1000 milliLiter(s) (100 mL/Hr) IV Continuous <Continuous>  sucralfate 1 Gram(s) Oral four times a day    MEDICATIONS  (PRN):  acetaminophen   Tablet .. 650 milliGRAM(s) Oral every 6 hours PRN Temp greater or equal to 38C (100.4F), Mild Pain (1 - 3)  aluminum hydroxide/magnesium hydroxide/simethicone Suspension 30 milliLiter(s) Oral every 6 hours PRN Dyspepsia  oxyCODONE    5 mG/acetaminophen 325 mG 1 Tablet(s) Oral every 6 hours PRN Severe Pain (7 - 10)      Allergies    morphine (Vomiting)  sulfa drugs (Other)    Intolerances        Vital Signs Last 24 Hrs  T(C): 36.8 (2018 05:17), Max: 36.9 (2018 11:23)  T(F): 98.3 (2018 05:17), Max: 98.5 (2018 11:23)  HR: 69 (2018 05:17) (64 - 93)  BP: 102/66 (2018 05:17) (90/60 - 122/77)  BP(mean): --  RR: 17 (2018 11:23) (17 - 17)  SpO2: 95% (2018 05:17) (95% - 98%)    PHYSICAL EXAM  General: adult in NAD  HEENT: clear oropharynx, anicteric sclera, pink conjunctiva  Neck: supple  CV: normal S1/S2 with no murmur rubs or gallops  Lungs: positive air movement b/l ant lungs,clear to auscultation, no wheezes, no rales  Abdomen: soft non-tender non-distended, no hepatosplenomegaly  Ext: no clubbing cyanosis or edema  Skin: no rashes and no petechiae  Neuro: alert and oriented X 4, no focal deficits      18 @ 07:01  -  18 @ 07:00  --------------------------------------------------------  IN: 0 mL / OUT: 985 mL / NET: -985 mL      LABS:                          9.3    5.61  )-----------( 143      ( 2018 06:28 )             30.3         Mean Cell Volume : 87.6 fl  Mean Cell Hemoglobin : 26.9 pg  Mean Cell Hemoglobin Concentration : 30.7 gm/dL  Auto Neutrophil # : 4.06 K/uL  Auto Lymphocyte # : 0.93 K/uL  Auto Monocyte # : 0.45 K/uL  Auto Eosinophil # : 0.09 K/uL  Auto Basophil # : 0.02 K/uL  Auto Neutrophil % : 72.3 %  Auto Lymphocyte % : 16.6 %  Auto Monocyte % : 8.0 %  Auto Eosinophil % : 1.6 %  Auto Basophil % : 0.4 %          143  |  105  |  16  ----------------------------<  81  3.7   |  29  |  0.88    Ca    8.1<L>      2018 06:28    TPro  5.6<L>  /  Alb  2.2<L>  /  TBili  0.3  /  DBili  x   /  AST  35  /  ALT  12  /  AlkPhos  320<H>                        BLOOD SMEAR INTERPRETATION:       RADIOLOGY & ADDITIONAL STUDIES: HEMATOLOGY ONCOLOGY CONSULT     Patient is a 78y old  Male who presents with a chief complaint of Call back for abnormal lab result (2018 08:05)      HPI:  77 y/o male with h/o metastatic prostate CA with peritoneal carcinomatosis, AAA, CAD, GERD, HLD presents to the ED with dull left side chest pain. Pt was in  ED last night for same symptoms, decided to leave AMA. Troponin and EKG was negative. Pt had negative cardiac work up on last admission at  on  after he presented with similar chest pain. Pt was discharge home on PO antibiotic for recent diagnosis of UTI. Per patient, he received call to come back to ED for abnormal lab finding. CP resolved. No SOB or wheezing. Denies fever, chills, abd pain, N/V/D.   Urine culture from 18 showed ESBL E/coli resistant to PO antibiotics.   UA done in ED grossly positive   Oncology has been consulted for recent CT imaging finding of extensive peritoneal carcinomatosis.   Hospital course has been complicated by ESBL UTI currently on carbapenem with ID following bilateral nephrostomy tubes, Chest pain and Acute blood loss anemia  initially planned to undergo endoscopy yet has refused bowel prep   patient was tangential during encounter and had multiple attempts to redirect conversation in order to obtain relevant oncologic history. Patient prese      PSH: as above and cholecystectomy, right cataract, THR, nephrostomy tube  Family Hx: Father-stroke; Mother-stroke,  age 80s (2018 00:16)       ROS: no pain, no back pain, no abdominal pain   remaining systems were negatinve    PAST MEDICAL & SURGICAL HISTORY:  CAD (coronary artery disease)  HLD (hyperlipidemia)  GERD (gastroesophageal reflux disease)  Colon obstruction  Prostate CA  Gallstones  Neurogenic bladder  Carotid stenosis: right and left  AAA (abdominal aortic aneurysm): 2010  Left rib fracture  S/P cataract surgery, right  H/O hemicolectomy  History of intestinal surgery  History of cholecystectomy  THR (Total Hip Replacement)      SOCIAL HISTORY:  no active etoh   +tobacco  FAMILY HISTORY:  No pertinent family history in first degree relatives      MEDICATIONS  (STANDING):  aspirin enteric coated 81 milliGRAM(s) Oral daily  atorvastatin 80 milliGRAM(s) Oral at bedtime  bicalutamide 50 milliGRAM(s) Oral daily  meropenem  IVPB 500 milliGRAM(s) IV Intermittent every 8 hours  metoprolol tartrate 25 milliGRAM(s) Oral three times a day  multivitamin 1 Tablet(s) Oral daily  pantoprazole    Tablet 40 milliGRAM(s) Oral before breakfast  sodium chloride 0.9%. 1000 milliLiter(s) (60 mL/Hr) IV Continuous <Continuous>  sodium chloride 0.9%. 1000 milliLiter(s) (100 mL/Hr) IV Continuous <Continuous>  sucralfate 1 Gram(s) Oral four times a day    MEDICATIONS  (PRN):  acetaminophen   Tablet .. 650 milliGRAM(s) Oral every 6 hours PRN Temp greater or equal to 38C (100.4F), Mild Pain (1 - 3)  aluminum hydroxide/magnesium hydroxide/simethicone Suspension 30 milliLiter(s) Oral every 6 hours PRN Dyspepsia  oxyCODONE    5 mG/acetaminophen 325 mG 1 Tablet(s) Oral every 6 hours PRN Severe Pain (7 - 10)      Allergies    morphine (Vomiting)  sulfa drugs (Other)    Intolerances        Vital Signs Last 24 Hrs  T(C): 36.8 (2018 05:17), Max: 36.9 (2018 11:23)  T(F): 98.3 (2018 05:17), Max: 98.5 (2018 11:23)  HR: 69 (2018 05:17) (64 - 93)  BP: 102/66 (2018 05:17) (90/60 - 122/77)  BP(mean): --  RR: 17 (2018 11:23) (17 - 17)  SpO2: 95% (2018 05:17) (95% - 98%)    PHYSICAL EXAM  General: adult frail elderly   HEENT: clear oropharynx, anicteric sclera, pink conjunctiva  Neck: supple  CV: normal S1/S2 with no murmur rubs or gallops  Lungs: positive air movement b/l ant lungs,clear to auscultation, no wheezes, no rales  Abdomen: soft non-tender non-distended, no hepatosplenomegaly, bilateral nephrostomy tubes  Ext: no clubbing cyanosis or edema  Skin: no rashes and no petechiae        18 @ 07:01  -  18 @ 07:00  --------------------------------------------------------  IN: 0 mL / OUT: 985 mL / NET: -985 mL      LABS:                          9.3    5.61  )-----------( 143      ( 2018 06:28 )             30.3         Mean Cell Volume : 87.6 fl  Mean Cell Hemoglobin : 26.9 pg  Mean Cell Hemoglobin Concentration : 30.7 gm/dL  Auto Neutrophil # : 4.06 K/uL  Auto Lymphocyte # : 0.93 K/uL  Auto Monocyte # : 0.45 K/uL  Auto Eosinophil # : 0.09 K/uL  Auto Basophil # : 0.02 K/uL  Auto Neutrophil % : 72.3 %  Auto Lymphocyte % : 16.6 %  Auto Monocyte % : 8.0 %  Auto Eosinophil % : 1.6 %  Auto Basophil % : 0.4 %          143  |  105  |  16  ----------------------------<  81  3.7   |  29  |  0.88    Ca    8.1<L>      2018 06:28    TPro  5.6<L>  /  Alb  2.2<L>  /  TBili  0.3  /  DBili  x   /  AST  35  /  ALT  12  /  AlkPhos  320<H>        < from: CT Angio Abdomen and Pelvis w/ IV Cont (14.18 @ 13:35) >    EXAM:  CT ANGIO ABD PELVIS (W)AW IC                          EXAM:  CTA CHEST DISSECTION (W)AW IC                            PROCEDURE DATE:  2018          INTERPRETATION:  Clinical information: Chest pain. Episode of   hypotension. Rule out pulmonary embolism or aortic dissection.    COMPARISON: CT abdomen/pelvis 2018. CTA chest 2017    PROCEDURE:   CT of the Chest, Abdomen and Pelvis was performed with and without   intravenous contrast.   Precontrast imaging was performed through the chest followed by arterial   phase imaging of the chest, abdomen and pelvis in the arterial phase.  Intravenous contrast: 90 ml Omnipaque 350. 10 ml discarded.  Oral contrast:None.  Sagittal and coronal reformats were performed and MIP images were   obtained.    FINDINGS:    CHEST:     LOWER NECK: Some sternal extension of the left thyroid lobe into the   superior mediastinum.  AXILLA, MEDIASTINUM AND STEWART: No lymphadenopathy.  VESSELS: Aneurysmal dilatation of the ascending aorta which measures 4.6   cm the level of the main pulmonary artery. No dissection, penetrating   ulcer or pseudoaneurysm. Coronary artery calcifications.  HEART: The heart is normal in size.No pericardial effusion.  PLEURA: No pleural effusion.  LUNGS AND LARGE AIRWAYS: Patent central airways. 6 mm right lower lobe   pulmonary nodule (2; 36), unchanged. No new pulmonary nodules.  CHEST WALL:  Unremarkable    ABDOMEN AND PELVIS:    LIVER: 3 cm irregularly-shaped solid mass in the posterior right hepatic   lobe with an exophytic component. Right hepatic lobe cyst.  SPLEEN: Within normal limits.  PANCREAS: Within normal limits.  GALLBLADDER: Cholecystectomy.  BILE DUCTS: Normal caliber.  ADRENALS: Right adrenal calcification compatible with old hemorrhage or   infection.  KIDNEYS/URETERS: No mass, stone or hydronephrosis. Bilateral percutaneous   nephrostomy tubes in place. 8 mm stone in the right renal pelvis.    RETROPERITONEUM: Widespread bulky retroperitoneal lymphadenopathy, as   numerous lymph nodes along the course of the inferior mesenteric artery   extending to the sigmoid colon are markedly worsened..  VESSELS:  Severe diffuse atherosclerotic arterial calcification. Normal   caliber aorta. The celiac and superior mesenteric arteries are patent.    BOWEL: No bowel obstruction, wall thickening or inflammatory change.   Appendix normal. Large amount of stool throughout the colon.  PERITONEUM: No ascites or pneumoperitoneum. Large sheetlike mass at the   root of the small bowelmesentery compatible with peritoneal tumor.  Hepatic metastasis.  REPRODUCTIVE ORGANS: Moderately enlarged prostate.  BLADDER: Collapsed around a Garcia catheter balloon.    ABDOMINAL WALL: Within normal limits.  BONES: No acute abnormality. Left hip arthroplasty.  Stable moderate T12   compression deformity. Multiple old left rib fractures.    IMPRESSION:    Chest: No aortic dissection or pulmonary bullous and.  Aneurysmal ascending aorta measuring 4.6 cm, unchanged.    Abdomen/pelvis: Bulky retroperitoneal lymphadenopathy as described,   significantly worsened.  Extensive peritoneal carcinomatosis involving the small bowel mesentery.   New partially exophytic right hepatic lobe lesion may also represent a   peritoneal implant, versus pelvic metastasis.                SAUL DUMONT   This document has been electronically signed. 2018  2:25PM                < end of copied text >

## 2018-11-27 NOTE — CHART NOTE - NSCHARTNOTEFT_GEN_A_CORE
Assessment:     *pt seemed confused and agitated during f/u. Information obtained from pt was limited 2/2 metal state however pt was able to state he has had poor intake 2/2 constipation. Pt was reported to have small BM last night.   *As per RN pt was eating breakfast this AM with no issues. Recommend to continue encouraging PO intake at each meal and with supplements and document PO intake in EMR.   *no new wt since 11/22; recommend obtaining new wt as medically feasible.   *noted edema b/l feet. Jian Branham      Recommendations:    1. Continue to encourage PO intake at meals and with supplements  2. Obtain new wt as feasible        Diet Presciption: Diet, Regular:   DASH/TLC {Sodium & Cholesterol Restricted} (DASH) (11-27-18 @ 09:21)      Wt Hx:  Height (cm): 167.64 (11-21-18 @ 18:49), 154.94 (11-21-18 @ 03:22), 177.8 (11-14-18 @ 11:38)  Weight (kg): 79.4 (11-21-18 @ 18:49), 63.5 (11-21-18 @ 03:22), 63.5 (11-14-18 @ 11:38)  BMI (kg/m2): 28.3 (11-21-18 @ 18:49), 26.5 (11-21-18 @ 03:22), 20.1 (11-14-18 @ 11:38)        Estimated Needs:   [x ] no change since previous assessment    Estimated Energy Needs (calories/kg):  · Weight Used for Calculation  current  58    Estimated Energy Needs (30-35 calories/kg):  · Weight  (lbs)  130 lb  · Weight (kg)  58.9 kg  · From (30 lili/kg)  1767  · To (35 calories/kg)  2061    Other Calculation:  · Other Calculation  Ht: 66in; Wt: 60.1kg; BMI: 21.3; IBW: 64.5kg; IBW%: 93%    Estimated Protein Needs (1.2-1.4 gm/kg):  · Weight  (lbs)  130  · Weight (kg)  58.9 kg  · From (1.2 g/kg)  70.68  · To (1.4 g/kg)  82.46    Estimated Fluid Needs (30-35 ml/kg):  · Weight  (lbs)  130  · Weight (kg)  58.9  · From (30 ml/kg)  1767  · To (35 ml/kg)  2061          Nutrition Diagnosis is [x ] ongoing  [ ] resolved [ ] not applicable         New Nutrition Diagnosis: [ x] not applicable     · Nutrition Diagnostic Terminology #1: Nutrient  · Nutrient: Malnutrition; Meets criteria for severe protein-calorie malnutrition in the context of chronic illness  · Etiology: Poor PO intake 2/2 increased nutrient needs of CA  · Signs/Symptoms: Severe muscle wasting, severe fat wasting, Clinically sig weight loss  · Nutrition Intervention: Meals and Snack; Medical Food Supplements  · Meals and Snacks: General/healthful diet  · Medical and Food Supplements: Commercial beverage; Ensure enlive BID  · Goal/Expected Outcome: Pt will complete > 75% at each meal and drink supplement        Pertinent Medications: MEDICATIONS  (STANDING):  aspirin enteric coated 81 milliGRAM(s) Oral daily  atorvastatin 80 milliGRAM(s) Oral at bedtime  bicalutamide 50 milliGRAM(s) Oral daily  meropenem  IVPB 500 milliGRAM(s) IV Intermittent every 8 hours  metoprolol tartrate 25 milliGRAM(s) Oral three times a day  multivitamin 1 Tablet(s) Oral daily  pantoprazole    Tablet 40 milliGRAM(s) Oral before breakfast  sodium chloride 0.9%. 1000 milliLiter(s) (60 mL/Hr) IV Continuous <Continuous>  sodium chloride 0.9%. 1000 milliLiter(s) (100 mL/Hr) IV Continuous <Continuous>  sucralfate 1 Gram(s) Oral four times a day    MEDICATIONS  (PRN):  acetaminophen   Tablet .. 650 milliGRAM(s) Oral every 6 hours PRN Temp greater or equal to 38C (100.4F), Mild Pain (1 - 3)  aluminum hydroxide/magnesium hydroxide/simethicone Suspension 30 milliLiter(s) Oral every 6 hours PRN Dyspepsia  oxyCODONE    5 mG/acetaminophen 325 mG 1 Tablet(s) Oral every 6 hours PRN Severe Pain (7 - 10)    Pertinent Labs: 11-27 Na143 mmol/L Glu 81 mg/dL K+ 3.7 mmol/L Cr  0.88 mg/dL BUN 16 mg/dL 11-21 Phos 3.3 mg/dL 11-26 Alb 2.2 g/dL<L> 11-22 Chol 93 mg/dL LDL 27 mg/dL HDL 51 mg/dL Trig 73 mg/dL     CAPILLARY BLOOD GLUCOSE          Skin: jian score = 18          Monitoring and Evaluation:   [x] PO intake/Nutr support infusion [ x ] Tolerance to Nutr [ x ] weights [ x ] labs[ x ] follow up per protocol  [ ] other:

## 2018-11-27 NOTE — CONSULT NOTE ADULT - ASSESSMENT
This is a 78 year old male with metastatic prostate CA [facility], here for ESBL UTI and acute blood loss anemia     - Now  refusing bowel prep - Hb currently stable   - would continue with supportive care with abx   - will need to contact patients primary oncologist as patient was not able to provide insight to ongoing treatment. he was aware of the extent of abdominal disease and that he is presently on injections, suggestive of ADT yet was not able to provide much more information regarding treatment and current disease status.   - Primary oncologist: Dr. Olmedo at [facility]   - will also contact patient primary care physician for more insight.   - I have provided the patient with my contact information   - will check PSA   Thank you for allowing me to participate in Mr. Ghotra's care.

## 2018-11-27 NOTE — PROVIDER CONTACT NOTE (CHANGE IN STATUS NOTIFICATION) - SITUATION
MD notified that upon dressing change left nephrostomy not sutured into place on skin. Appears to be slowly coming out.

## 2018-11-28 LAB
ANION GAP SERPL CALC-SCNC: 8 MMOL/L — SIGNIFICANT CHANGE UP (ref 5–17)
BASOPHILS # BLD AUTO: 0.03 K/UL — SIGNIFICANT CHANGE UP (ref 0–0.2)
BASOPHILS NFR BLD AUTO: 0.6 % — SIGNIFICANT CHANGE UP (ref 0–2)
BUN SERPL-MCNC: 19 MG/DL — SIGNIFICANT CHANGE UP (ref 7–23)
CALCIUM SERPL-MCNC: 8 MG/DL — LOW (ref 8.5–10.1)
CHLORIDE SERPL-SCNC: 106 MMOL/L — SIGNIFICANT CHANGE UP (ref 96–108)
CO2 SERPL-SCNC: 30 MMOL/L — SIGNIFICANT CHANGE UP (ref 22–31)
CREAT SERPL-MCNC: 0.86 MG/DL — SIGNIFICANT CHANGE UP (ref 0.5–1.3)
EOSINOPHIL # BLD AUTO: 0.08 K/UL — SIGNIFICANT CHANGE UP (ref 0–0.5)
EOSINOPHIL NFR BLD AUTO: 1.5 % — SIGNIFICANT CHANGE UP (ref 0–6)
GLUCOSE SERPL-MCNC: 92 MG/DL — SIGNIFICANT CHANGE UP (ref 70–99)
HCT VFR BLD CALC: 29.3 % — LOW (ref 39–50)
HGB BLD-MCNC: 8.9 G/DL — LOW (ref 13–17)
IMM GRANULOCYTES NFR BLD AUTO: 1.3 % — SIGNIFICANT CHANGE UP (ref 0–1.5)
LYMPHOCYTES # BLD AUTO: 0.94 K/UL — LOW (ref 1–3.3)
LYMPHOCYTES # BLD AUTO: 17.8 % — SIGNIFICANT CHANGE UP (ref 13–44)
MCHC RBC-ENTMCNC: 26.6 PG — LOW (ref 27–34)
MCHC RBC-ENTMCNC: 30.4 GM/DL — LOW (ref 32–36)
MCV RBC AUTO: 87.5 FL — SIGNIFICANT CHANGE UP (ref 80–100)
MONOCYTES # BLD AUTO: 0.51 K/UL — SIGNIFICANT CHANGE UP (ref 0–0.9)
MONOCYTES NFR BLD AUTO: 9.6 % — SIGNIFICANT CHANGE UP (ref 2–14)
NEUTROPHILS # BLD AUTO: 3.66 K/UL — SIGNIFICANT CHANGE UP (ref 1.8–7.4)
NEUTROPHILS NFR BLD AUTO: 69.2 % — SIGNIFICANT CHANGE UP (ref 43–77)
NRBC # BLD: 0 /100 WBCS — SIGNIFICANT CHANGE UP (ref 0–0)
PLATELET # BLD AUTO: 141 K/UL — LOW (ref 150–400)
POTASSIUM SERPL-MCNC: 3.7 MMOL/L — SIGNIFICANT CHANGE UP (ref 3.5–5.3)
POTASSIUM SERPL-SCNC: 3.7 MMOL/L — SIGNIFICANT CHANGE UP (ref 3.5–5.3)
RBC # BLD: 3.35 M/UL — LOW (ref 4.2–5.8)
RBC # FLD: 16.8 % — HIGH (ref 10.3–14.5)
SODIUM SERPL-SCNC: 144 MMOL/L — SIGNIFICANT CHANGE UP (ref 135–145)
WBC # BLD: 5.29 K/UL — SIGNIFICANT CHANGE UP (ref 3.8–10.5)
WBC # FLD AUTO: 5.29 K/UL — SIGNIFICANT CHANGE UP (ref 3.8–10.5)

## 2018-11-28 PROCEDURE — 50435 EXCHANGE NEPHROSTOMY CATH: CPT | Mod: RT

## 2018-11-28 RX ORDER — SODIUM CHLORIDE 9 MG/ML
1000 INJECTION INTRAMUSCULAR; INTRAVENOUS; SUBCUTANEOUS
Qty: 0 | Refills: 0 | Status: DISCONTINUED | OUTPATIENT
Start: 2018-11-28 | End: 2018-11-28

## 2018-11-28 RX ORDER — ONDANSETRON 8 MG/1
4 TABLET, FILM COATED ORAL ONCE
Qty: 0 | Refills: 0 | Status: DISCONTINUED | OUTPATIENT
Start: 2018-11-28 | End: 2018-11-28

## 2018-11-28 RX ADMIN — OXYCODONE AND ACETAMINOPHEN 1 TABLET(S): 5; 325 TABLET ORAL at 09:43

## 2018-11-28 RX ADMIN — BICALUTAMIDE 50 MILLIGRAM(S): 50 TABLET, FILM COATED ORAL at 12:02

## 2018-11-28 RX ADMIN — Medication 1 GRAM(S): at 17:04

## 2018-11-28 RX ADMIN — Medication 81 MILLIGRAM(S): at 12:01

## 2018-11-28 RX ADMIN — Medication 1 GRAM(S): at 12:01

## 2018-11-28 RX ADMIN — MEROPENEM 100 MILLIGRAM(S): 1 INJECTION INTRAVENOUS at 05:12

## 2018-11-28 RX ADMIN — MEROPENEM 100 MILLIGRAM(S): 1 INJECTION INTRAVENOUS at 21:32

## 2018-11-28 RX ADMIN — ATORVASTATIN CALCIUM 80 MILLIGRAM(S): 80 TABLET, FILM COATED ORAL at 21:32

## 2018-11-28 RX ADMIN — MEROPENEM 100 MILLIGRAM(S): 1 INJECTION INTRAVENOUS at 12:05

## 2018-11-28 RX ADMIN — OXYCODONE AND ACETAMINOPHEN 1 TABLET(S): 5; 325 TABLET ORAL at 01:26

## 2018-11-28 RX ADMIN — Medication 1 GRAM(S): at 05:12

## 2018-11-28 RX ADMIN — OXYCODONE AND ACETAMINOPHEN 1 TABLET(S): 5; 325 TABLET ORAL at 17:38

## 2018-11-28 RX ADMIN — OXYCODONE AND ACETAMINOPHEN 1 TABLET(S): 5; 325 TABLET ORAL at 17:36

## 2018-11-28 NOTE — BRIEF OPERATIVE NOTE - PROCEDURE
<<-----Click on this checkbox to enter Procedure Exchange, nephrostomy or pyelostomy tube  11/28/2018    Active  RODGER

## 2018-11-28 NOTE — PROGRESS NOTE ADULT - SUBJECTIVE AND OBJECTIVE BOX
CC:  Patient is a 78y old  Male who presents with a chief complaint of Call back for abnormal lab result (28 Nov 2018 10:30)    SUBJECTIVE:  offers no new complaints at current time.    ROS:  all other review of systems are negative unless indicated above.    acetaminophen   Tablet .. 650 milliGRAM(s) Oral every 6 hours PRN  aluminum hydroxide/magnesium hydroxide/simethicone Suspension 30 milliLiter(s) Oral every 6 hours PRN  aspirin enteric coated 81 milliGRAM(s) Oral daily  atorvastatin 80 milliGRAM(s) Oral at bedtime  bicalutamide 50 milliGRAM(s) Oral daily  meropenem  IVPB 500 milliGRAM(s) IV Intermittent every 8 hours  metoprolol tartrate 25 milliGRAM(s) Oral three times a day  multivitamin 1 Tablet(s) Oral daily  oxyCODONE    5 mG/acetaminophen 325 mG 1 Tablet(s) Oral every 6 hours PRN  pantoprazole    Tablet 40 milliGRAM(s) Oral before breakfast  sodium chloride 0.9%. 1000 milliLiter(s) IV Continuous <Continuous>  sodium chloride 0.9%. 1000 milliLiter(s) IV Continuous <Continuous>  sucralfate 1 Gram(s) Oral four times a day    T(C): 36.7 (11-28-18 @ 12:00), Max: 36.7 (11-27-18 @ 17:33)  HR: 61 (11-28-18 @ 12:00) (60 - 77)  BP: 99/68 (11-28-18 @ 12:00) (91/46 - 116/71)  RR: 15 (11-28-18 @ 12:00) (15 - 18)  SpO2: 98% (11-28-18 @ 12:00) (92% - 100%)    Constitutional: NAD, awake and alert, well-developed with good responses to questions, mentally intact.   HEENT: PERRL, EOMI, MMM.  Neck: Soft and supple, No carotid bruit, No JVD  Respiratory: Breath sounds are clear bilaterally, No wheezing, rales or rhonchi  Cardiovascular: S1 and S2, regular rate and rhythm, no murmur, rub or gallop.  Gastrointestinal: Bowel Sounds present, soft, nontender, nondistended, no guarding, no rebound, no mass.  Extremities: No peripheral edema  Vascular: 2+ peripheral pulses  Neurological: A/O x 3, no focal deficits  Musculoskeletal: 5/5 strength b/l upper and lower extremities  Skin:  no visible rashes.                         8.9    5.29  )-----------( 141      ( 28 Nov 2018 07:19 )             29.3       11-28    144  |  106  |  19  ----------------------------<  92  3.7   |  30  |  0.86    Ca    8.0<L>      28 Nov 2018 07:19

## 2018-11-28 NOTE — PROGRESS NOTE ADULT - ASSESSMENT
78M.  admitted 11/22/18.  presented to ED due to recall for 11/16/18 UCx + ESBL E. coli.      PMHx:  CAD;  AAA;  HLD;  GERD;  prostate CA-peritoneal carcinomatosis.    chest pain.  -favor musculaoskeletal v. GERD.  -CTA, negative PE.    acute blood loss anemia.  -stool OB +.  -patient deferred colonoscopy.  -PPI.  -GI outpatient.    complicated UTI.  -UCx + ESBL E. coli.  -IR replacement of b/l nephrostomy tubes.  -meropenem.  -[facility], f/u outpatient.    prostate CA.  -[facility]cology at [facility], f/u outpatient.    DVT prophylaxis.  -SCD.    disposition.  -2SW.  -anticipate transfer to [facility] once IV ABx completed and b/l nephrostomy tubes replaced.    communication.  -RN.  -Case Management.

## 2018-11-28 NOTE — PROGRESS NOTE ADULT - ASSESSMENT
79 y/o male with h/o metastatic prostate CA with peritoneal carcinomatosis, b/l nephrostomy tubes, AAA, CAD, GERD, HLD was admitted on 11/21 for dull left side chest pain. Pt was in  ED the night PTA for same symptoms, decided to leave AMA. Troponin and EKG was negative. Pt had cardiac work up on last admission at  on 11/14 after he presented with similar chest pain. At that time urine cultures were collected and he was discharge home on PO antibiotics. Later, urine cultures were reported with ESBL E.coli and the patient was asked to return to the hospital. In ER, he was given meropenem.    1. UTI with ESBL E.coli. B/l nephrostomy tubes. Metastatic prostate Ca.  -no fever  -repeat urine culture shows small amount of EN faecium likely contaminant  -BC x 2 and urine c/s noted  -on meropenem 500 mg IV q8h # 7  -tolerating abx well so far; no side effects noted  -plan to change nephrostomies tubes today  -plan to complete abx therapy after tubes changed  -will need f/u with [facility] for further nephrostomies care  -monitor temps  -f/u CBC  -supportive care  2. Other issues:   -care per medicine

## 2018-11-28 NOTE — PROGRESS NOTE ADULT - SUBJECTIVE AND OBJECTIVE BOX
Date of service: 18 @ 10:31    Lying in bed in NAD  Alert  No new complaints    ROS: no fever or chills; denies dizziness, no HA, no SOB or cough, no abdominal pain, no diarrhea or constipation; no legs pain, no rashes    MEDICATIONS  (STANDING):  aspirin enteric coated 81 milliGRAM(s) Oral daily  atorvastatin 80 milliGRAM(s) Oral at bedtime  bicalutamide 50 milliGRAM(s) Oral daily  meropenem  IVPB 500 milliGRAM(s) IV Intermittent every 8 hours  metoprolol tartrate 25 milliGRAM(s) Oral three times a day  multivitamin 1 Tablet(s) Oral daily  pantoprazole    Tablet 40 milliGRAM(s) Oral before breakfast  sodium chloride 0.9%. 1000 milliLiter(s) (60 mL/Hr) IV Continuous <Continuous>  sodium chloride 0.9%. 1000 milliLiter(s) (100 mL/Hr) IV Continuous <Continuous>  sucralfate 1 Gram(s) Oral four times a day      Vital Signs Last 24 Hrs  T(C): 36.4 (2018 04:50), Max: 36.8 (2018 11:26)  T(F): 97.5 (2018 04:50), Max: 98.3 (2018 11:26)  HR: 71 (2018 04:50) (71 - 77)  BP: 103/70 (2018 04:50) (91/46 - 116/71)  BP(mean): --  RR: 18 (2018 04:50) (17 - 18)  SpO2: 92% (2018 04:50) (92% - 95%)    Physical Exam:      Constitutional: frail looking  HEENT: NC/AT, EOMI, PERRLA, conjunctivae clear  Neck: supple; thyroid not palpable  Back: no tenderness  Respiratory: respiratory effort normal; decreased BS at bases  Cardiovascular: S1S2 regular, no murmurs  Abdomen: soft, not tender, not distended, positive BS  Genitourinary: no suprapubic tenderness; b/l tubes  Lymphatic: no LN palpable  Musculoskeletal: no muscle tenderness, no joint swelling or tenderness  Extremities: no pedal edema  Neurological/ Psychiatric: AxOx3, moving all extremities  Skin: no rashes; no palpable lesions    Labs: reviewed                        8.9    5.29  )-----------( 141      ( 2018 07:19 )             29.3         144  |  106  |  19  ----------------------------<  92  3.7   |  30  |  0.86    Ca    8.0<L>      2018 07:19                        8.9    7.66  )-----------( 116      ( 2018 06:29 )             28.9         144  |  109<H>  |  23  ----------------------------<  89  3.5   |  25  |  0.86    Ca    8.0<L>      2018 06:29  Phos  3.3       Mg     2.2         TPro  6.3  /  Alb  2.4<L>  /  TBili  0.6  /  DBili  x   /  AST  53<H>  /  ALT  13  /  AlkPhos  314<H>       LIVER FUNCTIONS - ( 2018 20:30 )  Alb: 2.4 g/dL / Pro: 6.3 gm/dL / ALK PHOS: 314 U/L / ALT: 13 U/L / AST: 53 U/L / GGT: x           Urinalysis Basic - ( 2018 20:30 )    Color: Yellow / Appearance: very cloudy / S.020 / pH: x  Gluc: x / Ketone: Trace  / Bili: Small / Urobili: 1 mg/dL   Blood: x / Protein: 500 mg/dL / Nitrite: Positive   Leuk Esterase: Moderate / RBC: 6-10 /HPF / WBC >50   Sq Epi: x / Non Sq Epi: Occasional / Bacteria: Many      Culture - Urine (collected 2018 04:45)  Source: .Urine None  Preliminary Report (2018 09:24):    >100,000 CFU/ml Escherichia coli    Culture - Urine (collected 2018 22:30)  Source: .Urine Nephrostomy - Left  Final Report (2018 10:56):    >100,000 CFU/ml Escherichia coli ESBL  Organism: Escherichia coli ESBL (2018 10:56)  Organism: Escherichia coli ESBL (2018 10:56)      -  Amikacin: S <=8      -  Amoxicillin/Clavulanic Acid: I 16/8      -  Ampicillin: R >16 These ampicillin results predict results for amoxicillin      -  Ampicillin/Sulbactam: R 16/8      -  Aztreonam: R 16      -  Cefazolin: R >16 For uncomplicated UTI with K. pneumoniae, E. coli, or P. mirablis: JERAMY <=16 is sensitive and JERAMY >=32 is resistant. This also predicts results for oral agents cefaclor, cefdinir, cefpodoxime, cefprozil, cefuroxime axetil, cephalexin and locarbef for uncomplicated UTI. Note that some isolates may be susceptible to these agents while testing resistant to cefazolin.      -  Cefepime: R 4      -  Cefoxitin: S <=4      -  Ceftriaxone: R >32 Enterobacter, Citrobacter, and Serratia may develop resistance during prolonged therapy      -  Ciprofloxacin: R >2      -  Ertapenem: S <=0.5      -  Gentamicin: S <=1      -  Imipenem: S <=1      -  Levofloxacin: R >4      -  Meropenem: S <=1      -  Nitrofurantoin: S <=32 Should not be used to treat pyelonephritis      -  Piperacillin/Tazobactam: R <=8      -  Tigecycline: S <=1      -  Tobramycin: R >8      -  Trimethoprim/Sulfamethoxazole: R >2/38      Method Type: JERAMY    Culture - Urine (11.21.18 @ 20:30)    Specimen Source: .Urine Clean Catch (Midstream)    Culture Results:   10,000 - 49,000 CFU/mL Enterococcus faecium        Radiology: all available radiological tests reviewed    Advanced directives addressed: full resuscitation

## 2018-11-29 LAB
ADD ON TEST-SPECIMEN IN LAB: SIGNIFICANT CHANGE UP
ANION GAP SERPL CALC-SCNC: 8 MMOL/L — SIGNIFICANT CHANGE UP (ref 5–17)
BUN SERPL-MCNC: 20 MG/DL — SIGNIFICANT CHANGE UP (ref 7–23)
CALCIUM SERPL-MCNC: 8 MG/DL — LOW (ref 8.5–10.1)
CHLORIDE SERPL-SCNC: 107 MMOL/L — SIGNIFICANT CHANGE UP (ref 96–108)
CO2 SERPL-SCNC: 28 MMOL/L — SIGNIFICANT CHANGE UP (ref 22–31)
CREAT SERPL-MCNC: 0.92 MG/DL — SIGNIFICANT CHANGE UP (ref 0.5–1.3)
GLUCOSE SERPL-MCNC: 120 MG/DL — HIGH (ref 70–99)
HCT VFR BLD CALC: 30.7 % — LOW (ref 39–50)
HGB BLD-MCNC: 9.4 G/DL — LOW (ref 13–17)
MCHC RBC-ENTMCNC: 26.4 PG — LOW (ref 27–34)
MCHC RBC-ENTMCNC: 30.6 GM/DL — LOW (ref 32–36)
MCV RBC AUTO: 86.2 FL — SIGNIFICANT CHANGE UP (ref 80–100)
NRBC # BLD: 0 /100 WBCS — SIGNIFICANT CHANGE UP (ref 0–0)
PLATELET # BLD AUTO: 134 K/UL — LOW (ref 150–400)
POTASSIUM SERPL-MCNC: 3.8 MMOL/L — SIGNIFICANT CHANGE UP (ref 3.5–5.3)
POTASSIUM SERPL-SCNC: 3.8 MMOL/L — SIGNIFICANT CHANGE UP (ref 3.5–5.3)
PSA FREE MFR FLD: 300.1 NG/ML — HIGH (ref 0–4)
RBC # BLD: 3.56 M/UL — LOW (ref 4.2–5.8)
RBC # FLD: 16.8 % — HIGH (ref 10.3–14.5)
SODIUM SERPL-SCNC: 143 MMOL/L — SIGNIFICANT CHANGE UP (ref 135–145)
WBC # BLD: 6.11 K/UL — SIGNIFICANT CHANGE UP (ref 3.8–10.5)
WBC # FLD AUTO: 6.11 K/UL — SIGNIFICANT CHANGE UP (ref 3.8–10.5)

## 2018-11-29 RX ORDER — HYDROMORPHONE HYDROCHLORIDE 2 MG/ML
0.5 INJECTION INTRAMUSCULAR; INTRAVENOUS; SUBCUTANEOUS ONCE
Qty: 0 | Refills: 0 | Status: DISCONTINUED | OUTPATIENT
Start: 2018-11-29 | End: 2018-11-29

## 2018-11-29 RX ADMIN — Medication 81 MILLIGRAM(S): at 09:25

## 2018-11-29 RX ADMIN — Medication 30 MILLILITER(S): at 03:31

## 2018-11-29 RX ADMIN — OXYCODONE AND ACETAMINOPHEN 1 TABLET(S): 5; 325 TABLET ORAL at 21:24

## 2018-11-29 RX ADMIN — MEROPENEM 100 MILLIGRAM(S): 1 INJECTION INTRAVENOUS at 21:31

## 2018-11-29 RX ADMIN — OXYCODONE AND ACETAMINOPHEN 1 TABLET(S): 5; 325 TABLET ORAL at 09:24

## 2018-11-29 RX ADMIN — MEROPENEM 100 MILLIGRAM(S): 1 INJECTION INTRAVENOUS at 06:20

## 2018-11-29 RX ADMIN — Medication 1 TABLET(S): at 09:25

## 2018-11-29 RX ADMIN — ATORVASTATIN CALCIUM 80 MILLIGRAM(S): 80 TABLET, FILM COATED ORAL at 21:30

## 2018-11-29 RX ADMIN — HYDROMORPHONE HYDROCHLORIDE 0.5 MILLIGRAM(S): 2 INJECTION INTRAMUSCULAR; INTRAVENOUS; SUBCUTANEOUS at 04:19

## 2018-11-29 RX ADMIN — OXYCODONE AND ACETAMINOPHEN 1 TABLET(S): 5; 325 TABLET ORAL at 01:52

## 2018-11-29 RX ADMIN — OXYCODONE AND ACETAMINOPHEN 1 TABLET(S): 5; 325 TABLET ORAL at 22:01

## 2018-11-29 RX ADMIN — Medication 1 GRAM(S): at 06:21

## 2018-11-29 RX ADMIN — MEROPENEM 100 MILLIGRAM(S): 1 INJECTION INTRAVENOUS at 14:15

## 2018-11-29 RX ADMIN — Medication 30 MILLILITER(S): at 09:32

## 2018-11-29 RX ADMIN — OXYCODONE AND ACETAMINOPHEN 1 TABLET(S): 5; 325 TABLET ORAL at 09:33

## 2018-11-29 RX ADMIN — Medication 1 GRAM(S): at 00:42

## 2018-11-29 RX ADMIN — Medication 1 GRAM(S): at 09:27

## 2018-11-29 RX ADMIN — Medication 1 GRAM(S): at 23:50

## 2018-11-29 RX ADMIN — BICALUTAMIDE 50 MILLIGRAM(S): 50 TABLET, FILM COATED ORAL at 09:26

## 2018-11-29 NOTE — CDI QUERY NOTE - NSCDI_DOCCLARIFY2_GEN_ALL_CORE_FT
Documentation clarification is required for compliance, accuracy in coding and billing, claim validation and reporting severity of illness, quality data and risk of mortality.

## 2018-11-29 NOTE — CDI QUERY NOTE - NSCDI_DOCCLARIFY_GEN_ALL_CORE_FT
In responding to this request, please exercise your independent professional judgment.  The fact that a question is asked does not imply that any particular answer is desired or expected.

## 2018-11-29 NOTE — PROGRESS NOTE ADULT - PROVIDER SPECIALTY LIST ADULT
Gastroenterology
Heme/Onc
Hospitalist
Infectious Disease
Hospitalist
Infectious Disease
Infectious Disease

## 2018-11-29 NOTE — PROGRESS NOTE ADULT - REASON FOR ADMISSION
Call back for abnormal lab result

## 2018-11-29 NOTE — CDI QUERY NOTE - NSCDINOTEDOCCLARIFICATION_GEN_A_CORE
PLEASE INCLUDE MORE SPECIFIC DOCUMENTATION IN YOUR PROGRESS NOTE AND DISCHARGE SUMMARY.  The documentation in this patient's medical record requires additional clarification to ensure we accurately capture the patients diagnosis(es), treatment and/or severity of illness. Please document to the greatest level of specificity all corresponding diagnoses (either known or suspected) and/or treatment associated with the clinical information described below.

## 2018-11-29 NOTE — PROGRESS NOTE ADULT - ASSESSMENT
79 y/o male with h/o metastatic prostate CA with peritoneal carcinomatosis, b/l nephrostomy tubes, AAA, CAD, GERD, HLD was admitted on 11/21 for dull left side chest pain. Pt was in  ED the night PTA for same symptoms, decided to leave AMA. Troponin and EKG was negative. Pt had cardiac work up on last admission at  on 11/14 after he presented with similar chest pain. At that time urine cultures were collected and he was discharge home on PO antibiotics. Later, urine cultures were reported with ESBL E.coli and the patient was asked to return to the hospital. In ER, he was given meropenem.    1. UTI with ESBL E.coli resolving. B/l nephrostomy tubes. Metastatic prostate Ca.  -no fever  -repeat urine culture shows small amount of EN faecium likely contaminant  -BC x 2 and urine c/s noted  -on meropenem 500 mg IV q8h # 8  -tolerating abx well so far; no side effects noted  -nephrostomy tubes changed  -complete abx therapy today  -will need [facility] for further nephrostomies care  -monitor temps  -f/u CBC  -supportive care  2. Other issues:   -care per medicine

## 2018-11-29 NOTE — PROGRESS NOTE ADULT - ASSESSMENT
78M.  admitted 11/22/18.  presented to ED due to recall for 11/16/18 UCx + ESBL E. coli.      PMHx:  CAD;  AAA;  HLD;  GERD;  prostate CA-peritoneal carcinomatosis.    chest pain.  -favor musculaoskeletal v. GERD.  -CTA, negative PE.    acute blood loss anemia.  -stool OB +.  -patient deferred colonoscopy.  -PPI.  -GI outpatient.    complicated UTI.  -UCx + ESBL E. coli.  -IR replacement of b/l nephrostomy tubes.  -meropenem.  -[facility], f/u outpatient.    prostate CA.  -[facility]cology at [facility], f/u outpatient.    DVT prophylaxis.  -SCD.    disposition.  -2SW.  -anticipate transfer to [facility] once IV ABx completed.    communication.  -RN.  -Case Management.

## 2018-11-29 NOTE — PROGRESS NOTE ADULT - SUBJECTIVE AND OBJECTIVE BOX
CC:  Patient is a 78y old  Male who presents with a chief complaint of Call back for abnormal lab result (29 Nov 2018 09:27)    SUBJECTIVE:  offers no new complaints at current time.    ROS:  all other review of systems are negative unless indicated above.    acetaminophen   Tablet .. 650 milliGRAM(s) Oral every 6 hours PRN  aluminum hydroxide/magnesium hydroxide/simethicone Suspension 30 milliLiter(s) Oral every 6 hours PRN  aspirin enteric coated 81 milliGRAM(s) Oral daily  atorvastatin 80 milliGRAM(s) Oral at bedtime  bicalutamide 50 milliGRAM(s) Oral daily  meropenem  IVPB 500 milliGRAM(s) IV Intermittent every 8 hours  metoprolol tartrate 25 milliGRAM(s) Oral three times a day  multivitamin 1 Tablet(s) Oral daily  oxyCODONE    5 mG/acetaminophen 325 mG 1 Tablet(s) Oral every 6 hours PRN  pantoprazole    Tablet 40 milliGRAM(s) Oral before breakfast  sodium chloride 0.9%. 1000 milliLiter(s) IV Continuous <Continuous>  sodium chloride 0.9%. 1000 milliLiter(s) IV Continuous <Continuous>  sucralfate 1 Gram(s) Oral four times a day    T(C): 36.9 (11-29-18 @ 11:27), Max: 36.9 (11-29-18 @ 11:27)  HR: 75 (11-29-18 @ 12:30) (68 - 98)  BP: 98/59 (11-29-18 @ 12:30) (98/59 - 125/74)  RR: 16 (11-29-18 @ 11:27) (16 - 18)  SpO2: 95% (11-29-18 @ 11:27) (94% - 97%)    Constitutional: frail, elderly wm.  no acute distress.  HEENT: PERRL, EOMI, MMM.  Neck: Soft and supple, No carotid bruit, No JVD  Respiratory: Breath sounds are clear bilaterally, No wheezing, rales or rhonchi  Cardiovascular: S1 and S2, regular rate and rhythm, no murmur, rub or gallop.  Gastrointestinal: Bowel Sounds present, soft, nontender, nondistended, no guarding, no rebound, no mass.  : + b/l nephrostomy tubes.  Extremities: No peripheral edema  Vascular: 2+ peripheral pulses  Neurological: A/O x 3, no focal deficits  Musculoskeletal: 5/5 strength b/l upper and lower extremities  Skin:  no visible rashes.                         9.4    6.11  )-----------( 134      ( 29 Nov 2018 07:05 )             30.7       11-29    143  |  107  |  20  ----------------------------<  120<H>  3.8   |  28  |  0.92    Ca    8.0<L>      29 Nov 2018 07:05

## 2018-11-29 NOTE — CDI QUERY NOTE - NSCDIOTHERTXTBX_GEN_ALL_CORE_HH
Per documentation patient admitted with  ESBL UTI and has  B/L Nephrostomy tube    Please document if there is a relationship between the following conditions: UTI and bilateral nephrostomy tubes  a) ESBL UTI is related to or associated with the presence of the  B/L Nephrostomy tube  b) ESBL UTI is unrelated to the presence of the  B/L Nephrostomy tube  c) Unable to determine a clinical relationship between the UTI and bilateral nephrostomy tubes  d) Other, please clarify

## 2018-11-29 NOTE — CHART NOTE - NSCHARTNOTEFT_GEN_A_CORE
Pt called for pain. Pt already ruled out for ACS and has had this type of pain before. A rapid had been called on his recent admission for exact same symptoms. Pt alternated between being silent in fetal position and lying flat and yelling. Pt unable to give history about the pain, but able to point to epigastric region. Per nurses, pt had also been sticking his finger down his throat as if to vomit but denied being nauseous.     VSS   Gen: moaning out and not answering questions   CV: RRR, +s1/s2, no M/R/G  Pulm: nl respiratory effort, CTAB, no wheezes/crackles/rhonchi    a/p: ACS ruled out already, possibly related to metastatic prostate cancer with extensive peritoneal carcinomatosis  - dilaudid 0.5mg IV x1  - continue to monitor

## 2018-11-29 NOTE — PROGRESS NOTE ADULT - SUBJECTIVE AND OBJECTIVE BOX
INTERVAL HISTORY:    REVIEW OF SYSTEMS:      Allergies    morphine (Vomiting)  sulfa drugs (Other)    Intolerances        MEDICATIONS  (STANDING):  aspirin enteric coated 81 milliGRAM(s) Oral daily  atorvastatin 80 milliGRAM(s) Oral at bedtime  bicalutamide 50 milliGRAM(s) Oral daily  meropenem  IVPB 500 milliGRAM(s) IV Intermittent every 8 hours  metoprolol tartrate 25 milliGRAM(s) Oral three times a day  multivitamin 1 Tablet(s) Oral daily  pantoprazole    Tablet 40 milliGRAM(s) Oral before breakfast  sodium chloride 0.9%. 1000 milliLiter(s) (60 mL/Hr) IV Continuous <Continuous>  sodium chloride 0.9%. 1000 milliLiter(s) (100 mL/Hr) IV Continuous <Continuous>  sucralfate 1 Gram(s) Oral four times a day    MEDICATIONS  (PRN):  acetaminophen   Tablet .. 650 milliGRAM(s) Oral every 6 hours PRN Temp greater or equal to 38C (100.4F), Mild Pain (1 - 3)  aluminum hydroxide/magnesium hydroxide/simethicone Suspension 30 milliLiter(s) Oral every 6 hours PRN Dyspepsia  oxyCODONE    5 mG/acetaminophen 325 mG 1 Tablet(s) Oral every 6 hours PRN Severe Pain (7 - 10)      Vital Signs Last 24 Hrs  T(C): 36.6 (29 Nov 2018 06:16), Max: 36.7 (28 Nov 2018 12:00)  T(F): 97.9 (29 Nov 2018 06:16), Max: 98.1 (28 Nov 2018 17:13)  HR: 91 (29 Nov 2018 06:16) (60 - 98)  BP: 111/81 (29 Nov 2018 06:16) (93/58 - 125/74)  BP(mean): --  RR: 16 (29 Nov 2018 06:16) (15 - 18)  SpO2: 94% (29 Nov 2018 06:16) (94% - 100%)    PHYSICAL EXAM:    GENERAL: NAD,   HEAD:  Atraumatic, Normocephalic  EYES: EOMI, PERRLA, conjunctiva and sclera clear    NECK: Supple, No JVD, Normal thyroid  NERVOUS SYSTEM:    CHEST/LUNG: Clear to percussion bilaterally; No rales, rhonchi,   HEART: Regular rate and rhythm;   ABDOMEN: Soft, Nontender.  EXTREMITIES:   edema:-  LYMPH: No lymphadenopathy noted        LABS:                        9.4    6.11  )-----------( 134      ( 29 Nov 2018 07:05 )             30.7     11-29    143  |  107  |  20  ----------------------------<  120<H>  3.8   |  28  |  0.92    Ca    8.0<L>      29 Nov 2018 07:05              RADIOLOGY & ADDITIONAL STUDIES:    PATHOLOGY: INTERVAL HISTORY: no new complaints. yesterday had nephrostomy's replaced. now with blood tinged output      Allergies    morphine (Vomiting)  sulfa drugs (Other)    Intolerances        MEDICATIONS  (STANDING):  aspirin enteric coated 81 milliGRAM(s) Oral daily  atorvastatin 80 milliGRAM(s) Oral at bedtime  bicalutamide 50 milliGRAM(s) Oral daily  meropenem  IVPB 500 milliGRAM(s) IV Intermittent every 8 hours  metoprolol tartrate 25 milliGRAM(s) Oral three times a day  multivitamin 1 Tablet(s) Oral daily  pantoprazole    Tablet 40 milliGRAM(s) Oral before breakfast  sodium chloride 0.9%. 1000 milliLiter(s) (60 mL/Hr) IV Continuous <Continuous>  sodium chloride 0.9%. 1000 milliLiter(s) (100 mL/Hr) IV Continuous <Continuous>  sucralfate 1 Gram(s) Oral four times a day    MEDICATIONS  (PRN):  acetaminophen   Tablet .. 650 milliGRAM(s) Oral every 6 hours PRN Temp greater or equal to 38C (100.4F), Mild Pain (1 - 3)  aluminum hydroxide/magnesium hydroxide/simethicone Suspension 30 milliLiter(s) Oral every 6 hours PRN Dyspepsia  oxyCODONE    5 mG/acetaminophen 325 mG 1 Tablet(s) Oral every 6 hours PRN Severe Pain (7 - 10)      Vital Signs Last 24 Hrs  T(C): 36.6 (29 Nov 2018 06:16), Max: 36.7 (28 Nov 2018 12:00)  T(F): 97.9 (29 Nov 2018 06:16), Max: 98.1 (28 Nov 2018 17:13)  HR: 91 (29 Nov 2018 06:16) (60 - 98)  BP: 111/81 (29 Nov 2018 06:16) (93/58 - 125/74)  BP(mean): --  RR: 16 (29 Nov 2018 06:16) (15 - 18)  SpO2: 94% (29 Nov 2018 06:16) (94% - 100%)    PHYSICAL EXAM:    GENERAL: NAD, frail  HEAD:  Atraumatic, Normocephalic  EYES: EOMI, PERRLA, conjunctiva and sclera clear    NECK: Supple, No JVD, Normal thyroid  NERVOUS SYSTEM:    CHEST/LUNG: Clear to percussion bilaterally; No rales, rhonchi, bilateral nephrostomy tubes  HEART: Regular rate and rhythm;   ABDOMEN: Soft, Nontender.  EXTREMITIES:   edema:-  LYMPH: No lymphadenopathy noted        LABS:                        9.4    6.11  )-----------( 134      ( 29 Nov 2018 07:05 )             30.7     11-29    143  |  107  |  20  ----------------------------<  120<H>  3.8   |  28  |  0.92    Ca    8.0<L>      29 Nov 2018 07:05              RADIOLOGY & ADDITIONAL STUDIES:    PATHOLOGY:

## 2018-11-29 NOTE — PROGRESS NOTE ADULT - ASSESSMENT
This is a 78 year old male with metastatic prostate CA presently [facility], here for ESBL UTI and acute blood loss anemia     - planned for nephrostomy tube replacement   - have not successfully reached patient's primary oncologis   - Primary oncologist: Dr. Olmedo at [facility]   - would defer treatment decisions to outpatient oncologist This is a 78 year old male with metastatic prostate CA presently on active treatment [facility], here for ESBL UTI and acute blood loss anemia     - nephrostomy tube replacement   - have not successfully reached patient's primary oncologist  - Primary oncologist: Dr. Olmedo at [facility]   - would defer treatment decisions to outpatient oncologist

## 2018-11-29 NOTE — CDI QUERY NOTE - NSCDINOTEPOA_GEN_ALL_CORE_FT
Was the condition present on admission, if so, please document in the chart that “(the condition) was present on admission.”

## 2018-11-29 NOTE — PROGRESS NOTE ADULT - SUBJECTIVE AND OBJECTIVE BOX
Date of service: 18 @ 09:27    Weak looking  No fever or chills  Had nephrostomy tubes changed yesterday    ROS: no HA, no SOB or cough, no abdominal pain, no diarrhea or constipation; no legs pain, no rashes    MEDICATIONS  (STANDING):  aspirin enteric coated 81 milliGRAM(s) Oral daily  atorvastatin 80 milliGRAM(s) Oral at bedtime  bicalutamide 50 milliGRAM(s) Oral daily  meropenem  IVPB 500 milliGRAM(s) IV Intermittent every 8 hours  metoprolol tartrate 25 milliGRAM(s) Oral three times a day  multivitamin 1 Tablet(s) Oral daily  pantoprazole    Tablet 40 milliGRAM(s) Oral before breakfast  sodium chloride 0.9%. 1000 milliLiter(s) (60 mL/Hr) IV Continuous <Continuous>  sodium chloride 0.9%. 1000 milliLiter(s) (100 mL/Hr) IV Continuous <Continuous>  sucralfate 1 Gram(s) Oral four times a day      Vital Signs Last 24 Hrs  T(C): 36.6 (2018 06:16), Max: 36.7 (2018 12:00)  T(F): 97.9 (2018 06:16), Max: 98.1 (2018 17:13)  HR: 91 (2018 06:16) (60 - 98)  BP: 111/81 (2018 06:16) (93/58 - 125/74)  BP(mean): --  RR: 16 (2018 06:16) (15 - 18)  SpO2: 94% (2018 06:16) (94% - 100%)    Physical Exam:      Constitutional: frail looking  HEENT: NC/AT, EOMI, PERRLA, conjunctivae clear  Neck: supple; thyroid not palpable  Back: no tenderness  Respiratory: respiratory effort normal; decreased BS at bases  Cardiovascular: S1S2 regular, no murmurs  Abdomen: soft, not tender, not distended, positive BS  Genitourinary: no suprapubic tenderness; b/l tubes  Lymphatic: no LN palpable  Musculoskeletal: no muscle tenderness, no joint swelling or tenderness  Extremities: no pedal edema  Neurological/ Psychiatric: AxOx3, moving all extremities  Skin: no rashes; no palpable lesions    Labs: reviewed                        8.9    5.29  )-----------( 141      ( 2018 07:19 )             29.3         144  |  106  |  19  ----------------------------<  92  3.7   |  30  |  0.86    Ca    8.0<L>      2018 07:19                        8.9    7.66  )-----------( 116      ( 2018 06:29 )             28.9         144  |  109<H>  |  23  ----------------------------<  89  3.5   |  25  |  0.86    Ca    8.0<L>      2018 06:29  Phos  3.3       Mg     2.2         TPro  6.3  /  Alb  2.4<L>  /  TBili  0.6  /  DBili  x   /  AST  53<H>  /  ALT  13  /  AlkPhos  314<H>       LIVER FUNCTIONS - ( 2018 20:30 )  Alb: 2.4 g/dL / Pro: 6.3 gm/dL / ALK PHOS: 314 U/L / ALT: 13 U/L / AST: 53 U/L / GGT: x           Urinalysis Basic - ( 2018 20:30 )    Color: Yellow / Appearance: very cloudy / S.020 / pH: x  Gluc: x / Ketone: Trace  / Bili: Small / Urobili: 1 mg/dL   Blood: x / Protein: 500 mg/dL / Nitrite: Positive   Leuk Esterase: Moderate / RBC: 6-10 /HPF / WBC >50   Sq Epi: x / Non Sq Epi: Occasional / Bacteria: Many      Culture - Urine (collected 2018 04:45)  Source: .Urine None  Preliminary Report (2018 09:24):    >100,000 CFU/ml Escherichia coli    Culture - Urine (collected 2018 22:30)  Source: .Urine Nephrostomy - Left  Final Report (2018 10:56):    >100,000 CFU/ml Escherichia coli ESBL  Organism: Escherichia coli ESBL (2018 10:56)  Organism: Escherichia coli ESBL (2018 10:56)      -  Amikacin: S <=8      -  Amoxicillin/Clavulanic Acid: I 16/8      -  Ampicillin: R >16 These ampicillin results predict results for amoxicillin      -  Ampicillin/Sulbactam: R 16/8      -  Aztreonam: R 16      -  Cefazolin: R >16 For uncomplicated UTI with K. pneumoniae, E. coli, or P. mirablis: JERAMY <=16 is sensitive and JERAMY >=32 is resistant. This also predicts results for oral agents cefaclor, cefdinir, cefpodoxime, cefprozil, cefuroxime axetil, cephalexin and locarbef for uncomplicated UTI. Note that some isolates may be susceptible to these agents while testing resistant to cefazolin.      -  Cefepime: R 4      -  Cefoxitin: S <=4      -  Ceftriaxone: R >32 Enterobacter, Citrobacter, and Serratia may develop resistance during prolonged therapy      -  Ciprofloxacin: R >2      -  Ertapenem: S <=0.5      -  Gentamicin: S <=1      -  Imipenem: S <=1      -  Levofloxacin: R >4      -  Meropenem: S <=1      -  Nitrofurantoin: S <=32 Should not be used to treat pyelonephritis      -  Piperacillin/Tazobactam: R <=8      -  Tigecycline: S <=1      -  Tobramycin: R >8      -  Trimethoprim/Sulfamethoxazole: R >/38      Method Type: JERAMY    Culture - Urine (11.21.18 @ 20:30)    Specimen Source: .Urine Clean Catch (Midstream)    Culture Results:   10,000 - 49,000 CFU/mL Enterococcus faecium        Radiology: all available radiological tests reviewed    Advanced directives addressed: full resuscitation

## 2018-11-30 ENCOUNTER — TRANSCRIPTION ENCOUNTER (OUTPATIENT)
Age: 78
End: 2018-11-30

## 2018-11-30 VITALS
TEMPERATURE: 98 F | DIASTOLIC BLOOD PRESSURE: 62 MMHG | SYSTOLIC BLOOD PRESSURE: 97 MMHG | HEART RATE: 77 BPM | OXYGEN SATURATION: 93 % | RESPIRATION RATE: 18 BRPM

## 2018-11-30 RX ORDER — SUCRALFATE 1 G
1 TABLET ORAL
Qty: 0 | Refills: 0 | COMMUNITY
Start: 2018-11-30

## 2018-11-30 RX ORDER — SUCRALFATE 1 G
1 TABLET ORAL
Qty: 0 | Refills: 0 | COMMUNITY

## 2018-11-30 RX ORDER — ASPIRIN/CALCIUM CARB/MAGNESIUM 324 MG
1 TABLET ORAL
Qty: 0 | Refills: 0 | COMMUNITY
Start: 2018-11-30

## 2018-11-30 RX ORDER — OXYCODONE AND ACETAMINOPHEN 5; 325 MG/1; MG/1
1 TABLET ORAL EVERY 6 HOURS
Qty: 0 | Refills: 0 | Status: DISCONTINUED | OUTPATIENT
Start: 2018-11-30 | End: 2018-11-30

## 2018-11-30 RX ORDER — ACETAMINOPHEN 500 MG
2 TABLET ORAL
Qty: 0 | Refills: 0 | COMMUNITY
Start: 2018-11-30

## 2018-11-30 RX ADMIN — BICALUTAMIDE 50 MILLIGRAM(S): 50 TABLET, FILM COATED ORAL at 12:49

## 2018-11-30 RX ADMIN — Medication 1 GRAM(S): at 12:45

## 2018-11-30 RX ADMIN — Medication 81 MILLIGRAM(S): at 12:45

## 2018-11-30 RX ADMIN — OXYCODONE AND ACETAMINOPHEN 1 TABLET(S): 5; 325 TABLET ORAL at 06:24

## 2018-11-30 RX ADMIN — Medication 1 TABLET(S): at 12:45

## 2018-11-30 RX ADMIN — Medication 1 GRAM(S): at 06:25

## 2018-11-30 RX ADMIN — PANTOPRAZOLE SODIUM 40 MILLIGRAM(S): 20 TABLET, DELAYED RELEASE ORAL at 06:27

## 2018-11-30 NOTE — DISCHARGE NOTE ADULT - PROVIDER TOKENS
FREE:[LAST:[Jaya],PHONE:[phone]],FAX:[(   )    -]],FREE:[LAST:[Michelle],FIRST:[Dev],PHONE:[phone]:[(   )    -]]

## 2018-11-30 NOTE — DISCHARGE NOTE ADULT - MEDICATION SUMMARY - MEDICATIONS TO TAKE
I will START or STAY ON the medications listed below when I get home from the hospital:    acetaminophen 325 mg oral tablet  -- 2 tab(s) by mouth every 6 hours, As needed, Temp greater or equal to 38C (100.4F), Mild Pain (1 - 3)  -- Indication: For .    aspirin 81 mg oral delayed release tablet  -- 1 tab(s) by mouth once a day  -- Indication: For .    aluminum hydroxide-magnesium hydroxide 200 mg-200 mg/5 mL oral suspension  -- 30 milliliter(s) by mouth every 6 hours, As needed, Dyspepsia  -- Indication: For .    rosuvastatin 20 mg oral tablet  -- 1 tab(s) by mouth once a day (at bedtime)  -- Indication: For .    bicalutamide 50 mg oral tablet  -- 1 tab(s) by mouth every 24 hours  -- Indication: For .    metoprolol tartrate 25 mg oral tablet  -- 1 tab(s) by mouth 3 times a day  -- Indication: For .    sucralfate 1 g oral tablet  -- 1 tab(s) by mouth 4 times a day  -- Indication: For .    pantoprazole 40 mg oral delayed release tablet  -- 1 tab(s) by mouth 2 times a day  -- Indication: For .    Multiple Vitamins oral tablet  -- 1 tab(s) by mouth once a day  -- Indication: For .

## 2018-11-30 NOTE — DISCHARGE NOTE ADULT - PATIENT PORTAL LINK FT
You can access the [facility], offered by [facility], by registering with the following website: [email or web address]

## 2018-11-30 NOTE — DISCHARGE NOTE ADULT - CARE PROVIDER_API CALL
Jaya,   Phone: [phone]  Fax: (   )    -    Dev Martinez  Phone: [phone]  Fax: (   )    -

## 2018-11-30 NOTE — DISCHARGE NOTE ADULT - CARE PLAN
Principal Discharge DX:	Prostate CA  Goal:	.  Assessment and plan of treatment:	Oncology outpatient follow up.

## 2018-11-30 NOTE — DISCHARGE NOTE ADULT - MEDICATION SUMMARY - MEDICATIONS TO STOP TAKING
I will STOP taking the medications listed below when I get home from the hospital:    Percocet 5/325 oral tablet  -- 1 tab(s) by mouth every 6 hours, As Needed -Severe Pain (7 - 10) MDD:4 tabs    cefuroxime 500 mg oral tablet  -- 1 tab(s) by mouth every 12 hours       ****Not Completed****  -- Finish all this medication unless otherwise directed by prescriber.  Medication should be taken with plenty of water.  Take with food or milk.

## 2018-11-30 NOTE — DISCHARGE NOTE ADULT - HOSPITAL COURSE
CC:  Patient is a 78y old  Male who presents with a chief complaint of Call back for abnormal lab result (29 Nov 2018 16:47)    SUBJECTIVE:  offers no new complaints at current time.    ROS:  all other review of systems are negative unless indicated above.    acetaminophen   Tablet .. 650 milliGRAM(s) Oral every 6 hours PRN  aluminum hydroxide/magnesium hydroxide/simethicone Suspension 30 milliLiter(s) Oral every 6 hours PRN  aspirin enteric coated 81 milliGRAM(s) Oral daily  atorvastatin 80 milliGRAM(s) Oral at bedtime  bicalutamide 50 milliGRAM(s) Oral daily  metoprolol tartrate 25 milliGRAM(s) Oral three times a day  multivitamin 1 Tablet(s) Oral daily  oxyCODONE    5 mG/acetaminophen 325 mG 1 Tablet(s) Oral every 6 hours PRN  pantoprazole    Tablet 40 milliGRAM(s) Oral before breakfast  sodium chloride 0.9%. 1000 milliLiter(s) IV Continuous <Continuous>  sodium chloride 0.9%. 1000 milliLiter(s) IV Continuous <Continuous>  sucralfate 1 Gram(s) Oral four times a day    T(C): 37 (11-30-18 @ 05:18), Max: 37 (11-30-18 @ 05:18)  HR: 75 (11-30-18 @ 06:30) (75 - 81)  BP: 102/61 (11-30-18 @ 06:30) (98/59 - 112/73)  RR: 17 (11-30-18 @ 00:48) (16 - 17)  SpO2: 95% (11-30-18 @ 05:18) (94% - 95%)    Constitutional: frail, elderly wm.  no acute distress.  HEENT: PERRL, EOMI, MMM.  Neck: Soft and supple, No carotid bruit, No JVD  Respiratory: Breath sounds are clear bilaterally, No wheezing, rales or rhonchi  Cardiovascular: S1 and S2, regular rate and rhythm, no murmur, rub or gallop.  Gastrointestinal: Bowel Sounds present, soft, nontender, nondistended, no guarding, no rebound, no mass.  : + b/l nephrostomy tubes-R serosanguanous.  L dark yellow.  Extremities: No peripheral edema  Vascular: 2+ peripheral pulses  Neurological: A/O x 3, no focal deficits  Musculoskeletal: 5/5 strength b/l upper and lower extremities  Skin:  no visible rashes.                         9.4    6.11  )-----------( 134      ( 29 Nov 2018 07:05 )             30.7       11-29    143  |  107  |  20  ----------------------------<  120<H>  3.8   |  28  |  0.92    Ca    8.0<L>      29 Nov 2018 07:05    78M.  admitted 11/22/18.  presented to ED due to recall for 11/16/18 UCx + ESBL E. coli.      PMHx:  CAD;  AAA;  HLD;  GERD;  prostate CA-peritoneal carcinomatosis.    chest pain.  -favor musculaoskeletal v. GERD.  -CTA, negative PE.    acute blood loss anemia.  -stool OB +.  -patient deferred colonoscopy.  -PPI.  [facility].    complicated UTI.  -UCx + ESBL E. coli.  -IR replacement of b/l nephrostomy tubes.  -completed meropenem.  -[facility], f/u outpatient.    prostate CA.  -[facility]cology at [facility], f/u [facility].    disposition.  -may discharge today to SNF.  -d/c > 35 minutes.    communication.  -RN.  -case management.

## 2018-12-02 ENCOUNTER — EMERGENCY (EMERGENCY)
Facility: HOSPITAL | Age: 78
LOS: 0 days | Discharge: HOME | End: 2018-12-02
Attending: EMERGENCY MEDICINE | Admitting: EMERGENCY MEDICINE
Payer: MEDICARE

## 2018-12-02 VITALS
DIASTOLIC BLOOD PRESSURE: 61 MMHG | OXYGEN SATURATION: 95 % | RESPIRATION RATE: 17 BRPM | SYSTOLIC BLOOD PRESSURE: 92 MMHG | WEIGHT: 130.95 LBS | HEART RATE: 68 BPM | TEMPERATURE: 97 F | HEIGHT: 67 IN

## 2018-12-02 VITALS
TEMPERATURE: 97 F | HEART RATE: 68 BPM | RESPIRATION RATE: 18 BRPM | OXYGEN SATURATION: 98 % | DIASTOLIC BLOOD PRESSURE: 67 MMHG | SYSTOLIC BLOOD PRESSURE: 109 MMHG

## 2018-12-02 DIAGNOSIS — Z79.82 LONG TERM (CURRENT) USE OF ASPIRIN: ICD-10-CM

## 2018-12-02 DIAGNOSIS — E78.5 HYPERLIPIDEMIA, UNSPECIFIED: ICD-10-CM
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
## 2018-12-02 DIAGNOSIS — Z93.6 OTHER ARTIFICIAL OPENINGS OF URINARY TRACT STATUS: ICD-10-CM

## 2018-12-02 DIAGNOSIS — M54.9 DORSALGIA, UNSPECIFIED: ICD-10-CM

## 2018-12-02 DIAGNOSIS — Z85.46 PERSONAL HISTORY OF MALIGNANT NEOPLASM OF PROSTATE: ICD-10-CM

## 2018-12-02 DIAGNOSIS — N31.9 NEUROMUSCULAR DYSFUNCTION OF BLADDER, UNSPECIFIED: ICD-10-CM

## 2018-12-02 DIAGNOSIS — M25.522 PAIN IN LEFT ELBOW: ICD-10-CM

## 2018-12-02 DIAGNOSIS — K80.20 CALCULUS OF GALLBLADDER WITHOUT CHOLECYSTITIS WITHOUT OBSTRUCTION: ICD-10-CM

## 2018-12-02 DIAGNOSIS — K21.9 GASTRO-ESOPHAGEAL REFLUX DISEASE WITHOUT ESOPHAGITIS: ICD-10-CM
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
## 2018-12-02 DIAGNOSIS — I71.4 ABDOMINAL AORTIC ANEURYSM, WITHOUT RUPTURE: ICD-10-CM

## 2018-12-02 DIAGNOSIS — Z88.2 ALLERGY STATUS TO SULFONAMIDES: ICD-10-CM

## 2018-12-02 DIAGNOSIS — Z98.890 OTHER SPECIFIED POSTPROCEDURAL STATES: Chronic | ICD-10-CM

## 2018-12-02 DIAGNOSIS — S22.32XA FRACTURE OF ONE RIB, LEFT SIDE, INITIAL ENCOUNTER FOR CLOSED FRACTURE: Chronic | ICD-10-CM

## 2018-12-02 DIAGNOSIS — Z98.41 CATARACT EXTRACTION STATUS, RIGHT EYE: Chronic | ICD-10-CM

## 2018-12-02 DIAGNOSIS — M25.552 PAIN IN LEFT HIP: ICD-10-CM

## 2018-12-02 DIAGNOSIS — Z88.5 ALLERGY STATUS TO NARCOTIC AGENT: ICD-10-CM

## 2018-12-02 DIAGNOSIS — M25.512 PAIN IN LEFT SHOULDER: ICD-10-CM

## 2018-12-02 DIAGNOSIS — Z90.49 ACQUIRED ABSENCE OF OTHER SPECIFIED PARTS OF DIGESTIVE TRACT: Chronic | ICD-10-CM
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
## 2018-12-02 DIAGNOSIS — Z98.89 OTHER SPECIFIED POSTPROCEDURAL STATES: Chronic | ICD-10-CM

## 2018-12-02 DIAGNOSIS — I25.10 ATHEROSCLEROTIC HEART DISEASE OF NATIVE CORONARY ARTERY WITHOUT ANGINA PECTORIS: ICD-10-CM

## 2018-12-02 PROCEDURE — 99284 EMERGENCY DEPT VISIT MOD MDM: CPT

## 2018-12-02 PROCEDURE — 72170 X-RAY EXAM OF PELVIS: CPT | Mod: 26

## 2018-12-02 PROCEDURE — 72125 CT NECK SPINE W/O DYE: CPT | Mod: 26

## 2018-12-02 PROCEDURE — 70450 CT HEAD/BRAIN W/O DYE: CPT | Mod: 26

## 2018-12-02 PROCEDURE — 71045 X-RAY EXAM CHEST 1 VIEW: CPT | Mod: 26

## 2018-12-02 NOTE — ED ADULT NURSE NOTE - NSIMPLEMENTINTERV_GEN_ALL_ED
Implemented All Fall with Harm Risk Interventions:  [place] to call system. Call bell, personal items and telephone within reach. Instruct patient to call for assistance. Room bathroom lighting operational. Non-slip footwear when patient is off stretcher. Physically safe environment: no spills, clutter or unnecessary equipment. Stretcher in lowest position, wheels locked, appropriate side rails in place. Provide visual cue, wrist band, yellow gown, etc. Monitor gait and stability. Monitor for mental status changes and reorient to person, place, and time. Review medications for side effects contributing to fall risk. Reinforce activity limits and safety measures with patient and family. Provide visual clues: red socks.

## 2018-12-02 NOTE — ED PROVIDER NOTE - OBJECTIVE STATEMENT
77 y/o M with a PMHx of AAA, CAD, HLD, and Prostate CA presenting to the ED via EMS from [facility] s/p unwitnessed mechanical fall today. Reports that he was walking at [place] today when he slipped and fell, landing on his left side. In ED, c/o left shoulder, left elbow, and back pain. Denies any CP, SOB, abd pain, N/V/D, HA, fevers, chills, numbness, or visual changes. Pt d/c from  two days ago after treatment for E. coli resistant urine ESBL. Bilateral nephrostomy tubes placed during this admission. Allergic to Morphine and Sulfa.

## 2018-12-02 NOTE — ED PROVIDER NOTE - PROGRESS NOTE DETAILS
AJM: pt feeling well. no complaints. imaging neg for acute injuries. normal exam. stable for dc home

## 2018-12-02 NOTE — ED PROVIDER NOTE - MEDICAL DECISION MAKING DETAILS
Pt presents s/p trip and fall, c/o left shoulder pain. Normal shoulder exam. No signs of fx. No neuro deficits. Given age and debility, will obtain CT head, C-spine, XR pelvis, CXR. If negative, will d/c back to [place]. Nephrostomy tubes in place and draining.

## 2018-12-05 ENCOUNTER — INPATIENT (INPATIENT)
Facility: HOSPITAL | Age: 78
LOS: 4 days | Discharge: HOSPICE | End: 2018-12-10
Attending: FAMILY MEDICINE | Admitting: FAMILY MEDICINE
Payer: MEDICARE

## 2018-12-05 VITALS
HEIGHT: 66 IN | WEIGHT: 126.99 LBS | HEART RATE: 81 BPM | DIASTOLIC BLOOD PRESSURE: 68 MMHG | RESPIRATION RATE: 18 BRPM | OXYGEN SATURATION: 91 % | SYSTOLIC BLOOD PRESSURE: 84 MMHG

## 2018-12-05 DIAGNOSIS — N39.0 URINARY TRACT INFECTION, SITE NOT SPECIFIED: ICD-10-CM

## 2018-12-05 DIAGNOSIS — E78.5 HYPERLIPIDEMIA, UNSPECIFIED: ICD-10-CM

## 2018-12-05 DIAGNOSIS — C78.6 SECONDARY MALIGNANT NEOPLASM OF RETROPERITONEUM AND PERITONEUM: ICD-10-CM

## 2018-12-05 DIAGNOSIS — Z90.49 ACQUIRED ABSENCE OF OTHER SPECIFIED PARTS OF DIGESTIVE TRACT: Chronic | ICD-10-CM

## 2018-12-05 DIAGNOSIS — Z90.49 ACQUIRED ABSENCE OF OTHER SPECIFIED PARTS OF DIGESTIVE TRACT: ICD-10-CM

## 2018-12-05 DIAGNOSIS — Z98.41 CATARACT EXTRACTION STATUS, RIGHT EYE: Chronic | ICD-10-CM

## 2018-12-05 DIAGNOSIS — C61 MALIGNANT NEOPLASM OF PROSTATE: ICD-10-CM

## 2018-12-05 DIAGNOSIS — I65.23 OCCLUSION AND STENOSIS OF BILATERAL CAROTID ARTERIES: ICD-10-CM

## 2018-12-05 DIAGNOSIS — E43 UNSPECIFIED SEVERE PROTEIN-CALORIE MALNUTRITION: ICD-10-CM

## 2018-12-05 DIAGNOSIS — Z71.89 OTHER SPECIFIED COUNSELING: ICD-10-CM

## 2018-12-05 DIAGNOSIS — K21.9 GASTRO-ESOPHAGEAL REFLUX DISEASE WITHOUT ESOPHAGITIS: ICD-10-CM

## 2018-12-05 DIAGNOSIS — T83.512A INFECTION AND INFLAMMATORY REACTION DUE TO NEPHROSTOMY CATHETER, INITIAL ENCOUNTER: ICD-10-CM

## 2018-12-05 DIAGNOSIS — I10 ESSENTIAL (PRIMARY) HYPERTENSION: ICD-10-CM

## 2018-12-05 DIAGNOSIS — I45.10 UNSPECIFIED RIGHT BUNDLE-BRANCH BLOCK: ICD-10-CM

## 2018-12-05 DIAGNOSIS — B96.20 UNSPECIFIED ESCHERICHIA COLI [E. COLI] AS THE CAUSE OF DISEASES CLASSIFIED ELSEWHERE: ICD-10-CM

## 2018-12-05 DIAGNOSIS — S22.32XA FRACTURE OF ONE RIB, LEFT SIDE, INITIAL ENCOUNTER FOR CLOSED FRACTURE: Chronic | ICD-10-CM

## 2018-12-05 DIAGNOSIS — Z92.21 PERSONAL HISTORY OF ANTINEOPLASTIC CHEMOTHERAPY: ICD-10-CM

## 2018-12-05 DIAGNOSIS — D63.0 ANEMIA IN NEOPLASTIC DISEASE: ICD-10-CM

## 2018-12-05 DIAGNOSIS — Z98.890 OTHER SPECIFIED POSTPROCEDURAL STATES: Chronic | ICD-10-CM

## 2018-12-05 DIAGNOSIS — K76.9 LIVER DISEASE, UNSPECIFIED: ICD-10-CM

## 2018-12-05 DIAGNOSIS — R07.89 OTHER CHEST PAIN: ICD-10-CM

## 2018-12-05 DIAGNOSIS — I25.10 ATHEROSCLEROTIC HEART DISEASE OF NATIVE CORONARY ARTERY WITHOUT ANGINA PECTORIS: ICD-10-CM

## 2018-12-05 DIAGNOSIS — Z87.891 PERSONAL HISTORY OF NICOTINE DEPENDENCE: ICD-10-CM

## 2018-12-05 DIAGNOSIS — Z79.82 LONG TERM (CURRENT) USE OF ASPIRIN: ICD-10-CM

## 2018-12-05 DIAGNOSIS — Z88.2 ALLERGY STATUS TO SULFONAMIDES: ICD-10-CM

## 2018-12-05 DIAGNOSIS — R59.9 ENLARGED LYMPH NODES, UNSPECIFIED: ICD-10-CM

## 2018-12-05 DIAGNOSIS — Z29.9 ENCOUNTER FOR PROPHYLACTIC MEASURES, UNSPECIFIED: ICD-10-CM

## 2018-12-05 DIAGNOSIS — K92.2 GASTROINTESTINAL HEMORRHAGE, UNSPECIFIED: ICD-10-CM

## 2018-12-05 DIAGNOSIS — Z88.5 ALLERGY STATUS TO NARCOTIC AGENT: ICD-10-CM

## 2018-12-05 DIAGNOSIS — D62 ACUTE POSTHEMORRHAGIC ANEMIA: ICD-10-CM

## 2018-12-05 DIAGNOSIS — Z98.89 OTHER SPECIFIED POSTPROCEDURAL STATES: Chronic | ICD-10-CM

## 2018-12-05 DIAGNOSIS — Z96.649 PRESENCE OF UNSPECIFIED ARTIFICIAL HIP JOINT: ICD-10-CM

## 2018-12-05 DIAGNOSIS — I71.2 THORACIC AORTIC ANEURYSM, WITHOUT RUPTURE: ICD-10-CM

## 2018-12-05 DIAGNOSIS — N31.9 NEUROMUSCULAR DYSFUNCTION OF BLADDER, UNSPECIFIED: ICD-10-CM

## 2018-12-05 LAB
ALBUMIN SERPL ELPH-MCNC: 2.5 G/DL — LOW (ref 3.3–5)
ALP SERPL-CCNC: 545 U/L — HIGH (ref 40–120)
ALT FLD-CCNC: 45 U/L — SIGNIFICANT CHANGE UP (ref 12–78)
ANION GAP SERPL CALC-SCNC: 11 MMOL/L — SIGNIFICANT CHANGE UP (ref 5–17)
APPEARANCE UR: ABNORMAL
APTT BLD: 31.8 SEC — SIGNIFICANT CHANGE UP (ref 27.5–36.3)
AST SERPL-CCNC: 223 U/L — HIGH (ref 15–37)
BASOPHILS # BLD AUTO: 0.02 K/UL — SIGNIFICANT CHANGE UP (ref 0–0.2)
BASOPHILS NFR BLD AUTO: 0.2 % — SIGNIFICANT CHANGE UP (ref 0–2)
BILIRUB SERPL-MCNC: 0.9 MG/DL — SIGNIFICANT CHANGE UP (ref 0.2–1.2)
BILIRUB UR-MCNC: NEGATIVE — SIGNIFICANT CHANGE UP
BUN SERPL-MCNC: 52 MG/DL — HIGH (ref 7–23)
CALCIUM SERPL-MCNC: 8.1 MG/DL — LOW (ref 8.5–10.1)
CHLORIDE SERPL-SCNC: 108 MMOL/L — SIGNIFICANT CHANGE UP (ref 96–108)
CO2 SERPL-SCNC: 28 MMOL/L — SIGNIFICANT CHANGE UP (ref 22–31)
COLOR SPEC: ABNORMAL
CREAT SERPL-MCNC: 1.76 MG/DL — HIGH (ref 0.5–1.3)
DIFF PNL FLD: ABNORMAL
EOSINOPHIL # BLD AUTO: 0 K/UL — SIGNIFICANT CHANGE UP (ref 0–0.5)
EOSINOPHIL NFR BLD AUTO: 0 % — SIGNIFICANT CHANGE UP (ref 0–6)
GLUCOSE SERPL-MCNC: 105 MG/DL — HIGH (ref 70–99)
GLUCOSE UR QL: NEGATIVE MG/DL — SIGNIFICANT CHANGE UP
HCT VFR BLD CALC: 27.9 % — LOW (ref 39–50)
HCT VFR BLD CALC: 28.7 % — LOW (ref 39–50)
HCT VFR BLD CALC: 32.5 % — LOW (ref 39–50)
HGB BLD-MCNC: 8.4 G/DL — LOW (ref 13–17)
HGB BLD-MCNC: 8.7 G/DL — LOW (ref 13–17)
HGB BLD-MCNC: 9.7 G/DL — LOW (ref 13–17)
IMM GRANULOCYTES NFR BLD AUTO: 0.6 % — SIGNIFICANT CHANGE UP (ref 0–1.5)
INR BLD: 1.74 RATIO — HIGH (ref 0.88–1.16)
KETONES UR-MCNC: ABNORMAL
LACTATE SERPL-SCNC: 2 MMOL/L — SIGNIFICANT CHANGE UP (ref 0.7–2)
LEUKOCYTE ESTERASE UR-ACNC: ABNORMAL
LYMPHOCYTES # BLD AUTO: 0.78 K/UL — LOW (ref 1–3.3)
LYMPHOCYTES # BLD AUTO: 8 % — LOW (ref 13–44)
MCHC RBC-ENTMCNC: 26.7 PG — LOW (ref 27–34)
MCHC RBC-ENTMCNC: 26.9 PG — LOW (ref 27–34)
MCHC RBC-ENTMCNC: 27.4 PG — SIGNIFICANT CHANGE UP (ref 27–34)
MCHC RBC-ENTMCNC: 29.8 GM/DL — LOW (ref 32–36)
MCHC RBC-ENTMCNC: 30.1 GM/DL — LOW (ref 32–36)
MCHC RBC-ENTMCNC: 30.3 GM/DL — LOW (ref 32–36)
MCV RBC AUTO: 89.4 FL — SIGNIFICANT CHANGE UP (ref 80–100)
MCV RBC AUTO: 89.5 FL — SIGNIFICANT CHANGE UP (ref 80–100)
MCV RBC AUTO: 90.3 FL — SIGNIFICANT CHANGE UP (ref 80–100)
MONOCYTES # BLD AUTO: 0.75 K/UL — SIGNIFICANT CHANGE UP (ref 0–0.9)
MONOCYTES NFR BLD AUTO: 7.7 % — SIGNIFICANT CHANGE UP (ref 2–14)
NEUTROPHILS # BLD AUTO: 8.08 K/UL — HIGH (ref 1.8–7.4)
NEUTROPHILS NFR BLD AUTO: 83.5 % — HIGH (ref 43–77)
NITRITE UR-MCNC: POSITIVE
NRBC # BLD: 0 /100 WBCS — SIGNIFICANT CHANGE UP (ref 0–0)
NRBC # BLD: 0 /100 WBCS — SIGNIFICANT CHANGE UP (ref 0–0)
PH UR: 7 — SIGNIFICANT CHANGE UP (ref 5–8)
PLATELET # BLD AUTO: 100 K/UL — LOW (ref 150–400)
PLATELET # BLD AUTO: 108 K/UL — LOW (ref 150–400)
PLATELET # BLD AUTO: 121 K/UL — LOW (ref 150–400)
POTASSIUM SERPL-MCNC: 4.7 MMOL/L — SIGNIFICANT CHANGE UP (ref 3.5–5.3)
POTASSIUM SERPL-SCNC: 4.7 MMOL/L — SIGNIFICANT CHANGE UP (ref 3.5–5.3)
PROT SERPL-MCNC: 6.1 GM/DL — SIGNIFICANT CHANGE UP (ref 6–8.3)
PROT UR-MCNC: 500 MG/DL
PROTHROM AB SERPL-ACNC: 19.6 SEC — HIGH (ref 10–12.9)
RBC # BLD: 3.12 M/UL — LOW (ref 4.2–5.8)
RBC # BLD: 3.18 M/UL — LOW (ref 4.2–5.8)
RBC # BLD: 3.63 M/UL — LOW (ref 4.2–5.8)
RBC # FLD: 17.9 % — HIGH (ref 10.3–14.5)
SODIUM SERPL-SCNC: 147 MMOL/L — HIGH (ref 135–145)
SP GR SPEC: 1.01 — SIGNIFICANT CHANGE UP (ref 1.01–1.02)
TROPONIN I SERPL-MCNC: 0.03 NG/ML — SIGNIFICANT CHANGE UP (ref 0.01–0.04)
TYPE + AB SCN PNL BLD: SIGNIFICANT CHANGE UP
UROBILINOGEN FLD QL: 1 MG/DL
WBC # BLD: 8.29 K/UL — SIGNIFICANT CHANGE UP (ref 3.8–10.5)
WBC # BLD: 8.51 K/UL — SIGNIFICANT CHANGE UP (ref 3.8–10.5)
WBC # BLD: 9.69 K/UL — SIGNIFICANT CHANGE UP (ref 3.8–10.5)
WBC # FLD AUTO: 8.29 K/UL — SIGNIFICANT CHANGE UP (ref 3.8–10.5)
WBC # FLD AUTO: 8.51 K/UL — SIGNIFICANT CHANGE UP (ref 3.8–10.5)
WBC # FLD AUTO: 9.69 K/UL — SIGNIFICANT CHANGE UP (ref 3.8–10.5)

## 2018-12-05 PROCEDURE — 70450 CT HEAD/BRAIN W/O DYE: CPT | Mod: 26

## 2018-12-05 PROCEDURE — 71045 X-RAY EXAM CHEST 1 VIEW: CPT | Mod: 26

## 2018-12-05 PROCEDURE — 71275 CT ANGIOGRAPHY CHEST: CPT | Mod: 26

## 2018-12-05 PROCEDURE — 74174 CTA ABD&PLVS W/CONTRAST: CPT | Mod: 26

## 2018-12-05 PROCEDURE — 99291 CRITICAL CARE FIRST HOUR: CPT

## 2018-12-05 RX ORDER — PANTOPRAZOLE SODIUM 20 MG/1
8 TABLET, DELAYED RELEASE ORAL
Qty: 80 | Refills: 0 | Status: DISCONTINUED | OUTPATIENT
Start: 2018-12-05 | End: 2018-12-06

## 2018-12-05 RX ORDER — SODIUM CHLORIDE 9 MG/ML
1000 INJECTION INTRAMUSCULAR; INTRAVENOUS; SUBCUTANEOUS ONCE
Qty: 0 | Refills: 0 | Status: COMPLETED | OUTPATIENT
Start: 2018-12-05 | End: 2018-12-05

## 2018-12-05 RX ORDER — PANTOPRAZOLE SODIUM 20 MG/1
1 TABLET, DELAYED RELEASE ORAL
Qty: 0 | Refills: 0 | COMMUNITY

## 2018-12-05 RX ORDER — ONDANSETRON 8 MG/1
4 TABLET, FILM COATED ORAL EVERY 6 HOURS
Qty: 0 | Refills: 0 | Status: DISCONTINUED | OUTPATIENT
Start: 2018-12-05 | End: 2018-12-10

## 2018-12-05 RX ORDER — ROSUVASTATIN CALCIUM 5 MG/1
1 TABLET ORAL
Qty: 0 | Refills: 0 | COMMUNITY

## 2018-12-05 RX ORDER — PANTOPRAZOLE SODIUM 20 MG/1
80 TABLET, DELAYED RELEASE ORAL ONCE
Qty: 0 | Refills: 0 | Status: COMPLETED | OUTPATIENT
Start: 2018-12-05 | End: 2018-12-05

## 2018-12-05 RX ORDER — PIPERACILLIN AND TAZOBACTAM 4; .5 G/20ML; G/20ML
3.38 INJECTION, POWDER, LYOPHILIZED, FOR SOLUTION INTRAVENOUS ONCE
Qty: 0 | Refills: 0 | Status: COMPLETED | OUTPATIENT
Start: 2018-12-05 | End: 2018-12-05

## 2018-12-05 RX ORDER — MEROPENEM 1 G/30ML
500 INJECTION INTRAVENOUS EVERY 8 HOURS
Qty: 0 | Refills: 0 | Status: DISCONTINUED | OUTPATIENT
Start: 2018-12-05 | End: 2018-12-07

## 2018-12-05 RX ADMIN — SODIUM CHLORIDE 1000 MILLILITER(S): 9 INJECTION INTRAMUSCULAR; INTRAVENOUS; SUBCUTANEOUS at 13:16

## 2018-12-05 RX ADMIN — MEROPENEM 100 MILLIGRAM(S): 1 INJECTION INTRAVENOUS at 22:34

## 2018-12-05 RX ADMIN — PIPERACILLIN AND TAZOBACTAM 200 GRAM(S): 4; .5 INJECTION, POWDER, LYOPHILIZED, FOR SOLUTION INTRAVENOUS at 16:56

## 2018-12-05 RX ADMIN — SODIUM CHLORIDE 1000 MILLILITER(S): 9 INJECTION INTRAMUSCULAR; INTRAVENOUS; SUBCUTANEOUS at 14:19

## 2018-12-05 RX ADMIN — SODIUM CHLORIDE 3000 MILLILITER(S): 9 INJECTION INTRAMUSCULAR; INTRAVENOUS; SUBCUTANEOUS at 13:15

## 2018-12-05 RX ADMIN — PANTOPRAZOLE SODIUM 10 MG/HR: 20 TABLET, DELAYED RELEASE ORAL at 16:55

## 2018-12-05 RX ADMIN — SODIUM CHLORIDE 1000 MILLILITER(S): 9 INJECTION INTRAMUSCULAR; INTRAVENOUS; SUBCUTANEOUS at 12:22

## 2018-12-05 RX ADMIN — PANTOPRAZOLE SODIUM 80 MILLIGRAM(S): 20 TABLET, DELAYED RELEASE ORAL at 13:18

## 2018-12-05 RX ADMIN — SODIUM CHLORIDE 1000 MILLILITER(S): 9 INJECTION INTRAMUSCULAR; INTRAVENOUS; SUBCUTANEOUS at 11:21

## 2018-12-05 NOTE — H&P ADULT - PROBLEM SELECTOR PLAN 6
IMPROVE VTE Risk Score is 4  [  ] Previous VTE                                                  3  [  ] Thrombophilia                                               2  [  ] Lower limb paralysis                                      2        (unable to hold up >15 seconds)    [X] Current Cancer                                              2         (within 6 months)  [X] Immobilization > 24 hrs                                1  [  ] ICU/CCU stay > 24 hours                              1  [X] Age > 60                                                      1  ~cont. SCDs for now due to GI bleeding.

## 2018-12-05 NOTE — H&P ADULT - NSHPOUTPATIENTPROVIDERS_GEN_ALL_CORE
PCP; Dr. Ramos Giraldo ([facility]) PCP; Dr. Ramos Giraldo ([facility])

## 2018-12-05 NOTE — ED PROVIDER NOTE - PHYSICAL EXAMINATION
dry mm  non verbal    neuro: drowsy    mdm: 77 yo M presenting with vomiting blood, hypotensive, NOn verbal, will call health care proxy will ocnsent ot transfusion, blood work, give iv fluids and reassess.
no myalgia/no arthritis/no back pain/no arthralgia

## 2018-12-05 NOTE — ED ADULT TRIAGE NOTE - CHIEF COMPLAINT QUOTE
Patient presents with coffee ground emesis and hypotension. EMS reports staff stated GI bleed started this morning.

## 2018-12-05 NOTE — H&P ADULT - PROBLEM SELECTOR PLAN 4
~will hold Metoprolol for now due to low BP (normally takes 25mg po tid)   ~please reassess in the am

## 2018-12-05 NOTE — PROVIDER CONTACT NOTE (OTHER) - SITUATION
Office is aware of consult . Office is aware of consult .    Called answering service to cancel consult. Pt sees Dr. Mckeon.    Dr. Mckeon's answering service is aware of consult.

## 2018-12-05 NOTE — ED PROVIDER NOTE - PROGRESS NOTE DETAILS
Mihai Jacobs for Dr Alma Mc: Called every one of patient's listed emergenchy contacts and unable to get a hold of any of them. Called [facility] who is unsure of who they spoke to regarding pt being transported to ED or who they left a message for. Left message for nurse at [place] who called emergency contact to call the ED regarding patient case. Mihai Jacobs for Dr Alma Mc: pt has hx of AAA and fall two days ago, will obtain CT A&P, chest. Understand Cr is elevated. Could be life threatening, therefore will obtain CT. discussed with dr leonardo for admission, phlebotomy coming to draw rpt.  discussed pts daughter earlier, pt is dnr/dni, do not central line no invasive measures.  daughter is Berta, [phone] discussed with dr leonardo for admission, phlebotomy coming to draw rpt cbc.  discussed pts daughter earlier, pt is dnr/dni, do not central line no invasive measures.  daughter is Berta, [phone] discussed with dr salazar who request protonix ip cbc resulted, discussed with dr leonardo hospitalist.  will hold blood at this time

## 2018-12-05 NOTE — ED ADULT TRIAGE NOTE - RESPIRATORY RATE (BREATHS/MIN)

## 2018-12-05 NOTE — ED PROVIDER NOTE - OBJECTIVE STATEMENT
Pt is a 77 y/o M, with PMHx of AAA, CAD, colon obsturction, gallstones, GERD, HLD, prostate CA, and non verbal at baseline, BIBEMS from [facility] regarding episode of coffee ground emesis. Pt unable to provide hx. No other reports complaints per NH staff. Pt was hypotensive en route to the ED.

## 2018-12-05 NOTE — H&P ADULT - PROBLEM SELECTOR PLAN 1
~admit to Medicine  ~serial CBCs  ~transfuse prn  ~f/u w/ GI consultation in the am  ~as per prior notes the patient refused colonoscopy/bowel prep  ~cont. PPI gtt per protocol

## 2018-12-05 NOTE — H&P ADULT - HISTORY OF PRESENT ILLNESS
79 y/o male with CAD, hyperlipidemia, GERD, metastatic prostate CA with peritoneal carcinomatosis, neurogenic bladder s/p b/l nephrostomies, AAA, presents to the ED for further evaluation of episodes of 'coffee ground emesis.' As per EMS and ED documentation the patient was notably hypotensive at [place] (60/42) and upon arrival to  (BP 84/68 in triage). Labs => Hgb/Hct 9.7/32.5 -> 8.4/27.9, , INR 1.74, Na 147, BUN/Cr 52/1.76, Alb 2.5, , , UA (+). In the ED the patient was given NS x 3L, started on a PPI gtt per protocol, and given Piperacillin/tazobactam 3.375g IVPB x 1. 77 y/o male with CAD, hyperlipidemia, GERD, metastatic prostate CA with peritoneal carcinomatosis, neurogenic bladder s/p b/l nephrostomies, AAA, presents to the ED for further evaluation of episodes of 'coffee ground emesis.' As per EMS, NH, and ED documentation (patient unable to provide significant HPI due to current medical condition) the patient was notably hypotensive at [place] (60/42) and upon arrival to  (BP 84/68 in triage). Labs => Hgb/Hct 9.7/32.5 -> 8.4/27.9, , INR 1.74, Na 147, BUN/Cr 52/1.76, Alb 2.5, , , UA (+). In the ED the patient was given NS x 3L, started on a PPI gtt per protocol, and given Piperacillin/tazobactam 3.375g IVPB x 1.

## 2018-12-05 NOTE — H&P ADULT - NSHPPHYSICALEXAM_GEN_ALL_CORE
"Oncology Rooming Note    January 5, 2018 1:30 PM   Soila Juarez is a 52 year old male who presents for:    Chief Complaint   Patient presents with     Port Draw     accessed with power needle for labs, saline flushed, vitals checked     Oncology Clinic Visit     f/u7 Mucinous Adenocarcinoma Omentum     Initial Vitals: /74  Pulse 83  Temp 98.5  F (36.9  C)  Resp 16  Ht 1.78 m (5' 10.08\")  Wt 65.8 kg (145 lb)  SpO2 99%  BMI 20.76 kg/m2 Estimated body mass index is 20.76 kg/(m^2) as calculated from the following:    Height as of this encounter: 1.78 m (5' 10.08\").    Weight as of this encounter: 65.8 kg (145 lb). Body surface area is 1.8 meters squared.  No Pain (0) Comment: Data Unavailable   No LMP for male patient.  Allergies reviewed: Yes  Medications reviewed: Yes    Medications: MEDICATION REFILLS NEEDED TODAY. Provider was notified.  Pharmacy name entered into EPIC: Data Unavailable    Clinical concerns: none Dara was NOT notified.    10 minutes for nursing intake (face to face time)     XIMENA GAINES LPN            " Vital Signs Last 24 Hrs  T(C): 36.6 (05 Dec 2018 10:58), Max: 36.6 (05 Dec 2018 10:58)  T(F): 97.9 (05 Dec 2018 10:58), Max: 97.9 (05 Dec 2018 10:58)  HR: 72 (05 Dec 2018 15:48) (72 - 85)  BP: 110/68 (05 Dec 2018 15:48) (70/45 - 117/83)  RR: 18 (05 Dec 2018 10:36) (18 - 18)  SpO2: 91% (05 Dec 2018 10:36) (91% - 91%)

## 2018-12-05 NOTE — ED ADULT NURSE NOTE - NSIMPLEMENTINTERV_GEN_ALL_ED
Implemented All Fall with Harm Risk Interventions:  [place] to call system. Call bell, personal items and telephone within reach. Instruct patient to call for assistance. Room bathroom lighting operational. Non-slip footwear when patient is off stretcher. Physically safe environment: no spills, clutter or unnecessary equipment. Stretcher in lowest position, wheels locked, appropriate side rails in place. Provide visual cue, wrist band, yellow gown, etc. Monitor gait and stability. Monitor for mental status changes and reorient to person, place, and time. Review medications for side effects contributing to fall risk. Reinforce activity limits and safety measures with patient and family. Provide visual clues: red socks.

## 2018-12-05 NOTE — H&P ADULT - ASSESSMENT
79 y/o male with CAD, hyperlipidemia, GERD, metastatic prostate CA with peritoneal carcinomatosis, neurogenic bladder s/p b/l nephrostomies, AAA, presents to the ED for further evaluation of episodes of 'coffee ground emesis.' As per EMS and ED documentation the patient was notably hypotensive at [place] (60/42) and upon arrival to  (BP 84/68 in triage). Labs => Hgb/Hct 9.7/32.5 -> 8.4/27.9, , INR 1.74, Na 147, BUN/Cr 52/1.76, Alb 2.5, , , UA (+). In the ED the patient was given NS x 3L, started on a PPI gtt per protocol, and given Piperacillin/tazobactam 3.375g IVPB x 1. 79 y/o male with CAD, hyperlipidemia, GERD, metastatic prostate CA with peritoneal carcinomatosis, neurogenic bladder s/p b/l nephrostomies, AAA, presents to the ED for further evaluation of episodes of 'coffee ground emesis.' As per EMS, NH, and ED documentation (patient unable to provide significant HPI due to current medical condition) the patient was notably hypotensive at [place] [employer] (60/42) and upon arrival to  (BP 84/68 in triage). Labs => Hgb/Hct 9.7/32.5 -> 8.4/27.9, , INR 1.74, Na 147, BUN/Cr 52/1.76, Alb 2.5, , , UA (+). In the ED the patient was given NS x 3L, started on a PPI gtt per protocol, and given Piperacillin/tazobactam 3.375g IVPB x 1.

## 2018-12-05 NOTE — H&P ADULT - PROBLEM SELECTOR PLAN 5
~as noted in prior documentation and per patient's daughter Berta Ghotra [phone] the patient is DNR/DNI (MOLST form in chart). The patient does not want any invasive procedures including central line insertion at this time.  Total time' 16 minutes

## 2018-12-05 NOTE — ED PROVIDER NOTE - MEDICAL DECISION MAKING DETAILS
79 yo M presenting with vomiting blood, hypotensive. Pt is non verbal. Will call health care proxy to see if they will consent to transfusion. Obtain labs, give IV fluids, and reassess.

## 2018-12-05 NOTE — ED PROVIDER NOTE - CRITICAL CARE PROVIDED
additional history taking/documentation/conducted a detailed discussion of DNR status/direct patient care (not related to procedure)/interpretation of diagnostic studies/consultation with other physicians/consult w/ pt's family directly relating to pts condition

## 2018-12-05 NOTE — H&P ADULT - FAMILY HISTORY
No pertinent family history in first degree relatives Father  Still living? Unknown  Family history of cerebrovascular accident (CVA), Age at diagnosis: Age Unknown     Mother  Still living? Unknown  Family history of cerebrovascular accident (CVA), Age at diagnosis: Age Unknown

## 2018-12-06 DIAGNOSIS — K92.0 HEMATEMESIS: ICD-10-CM

## 2018-12-06 LAB
ALBUMIN SERPL ELPH-MCNC: 2 G/DL — LOW (ref 3.3–5)
ALP SERPL-CCNC: 423 U/L — HIGH (ref 40–120)
ALT FLD-CCNC: 34 U/L — SIGNIFICANT CHANGE UP (ref 12–78)
ANION GAP SERPL CALC-SCNC: 9 MMOL/L — SIGNIFICANT CHANGE UP (ref 5–17)
AST SERPL-CCNC: 136 U/L — HIGH (ref 15–37)
BILIRUB SERPL-MCNC: 0.7 MG/DL — SIGNIFICANT CHANGE UP (ref 0.2–1.2)
BUN SERPL-MCNC: 53 MG/DL — HIGH (ref 7–23)
CALCIUM SERPL-MCNC: 8 MG/DL — LOW (ref 8.5–10.1)
CHLORIDE SERPL-SCNC: 115 MMOL/L — HIGH (ref 96–108)
CO2 SERPL-SCNC: 25 MMOL/L — SIGNIFICANT CHANGE UP (ref 22–31)
CREAT SERPL-MCNC: 1.58 MG/DL — HIGH (ref 0.5–1.3)
CULTURE RESULTS: SIGNIFICANT CHANGE UP
GLUCOSE SERPL-MCNC: 86 MG/DL — SIGNIFICANT CHANGE UP (ref 70–99)
HCT VFR BLD CALC: 23.7 % — LOW (ref 39–50)
HCT VFR BLD CALC: 29.4 % — LOW (ref 39–50)
HCT VFR BLD CALC: 30.3 % — LOW (ref 39–50)
HCT VFR BLD CALC: 30.8 % — LOW (ref 39–50)
HGB BLD-MCNC: 7.1 G/DL — LOW (ref 13–17)
HGB BLD-MCNC: 8.9 G/DL — LOW (ref 13–17)
HGB BLD-MCNC: 9.5 G/DL — LOW (ref 13–17)
HGB BLD-MCNC: 9.5 G/DL — LOW (ref 13–17)
MAGNESIUM SERPL-MCNC: 2.4 MG/DL — SIGNIFICANT CHANGE UP (ref 1.6–2.6)
MCHC RBC-ENTMCNC: 27 PG — SIGNIFICANT CHANGE UP (ref 27–34)
MCHC RBC-ENTMCNC: 27.3 PG — SIGNIFICANT CHANGE UP (ref 27–34)
MCHC RBC-ENTMCNC: 27.4 PG — SIGNIFICANT CHANGE UP (ref 27–34)
MCHC RBC-ENTMCNC: 30 GM/DL — LOW (ref 32–36)
MCHC RBC-ENTMCNC: 30.8 GM/DL — LOW (ref 32–36)
MCHC RBC-ENTMCNC: 31.4 GM/DL — LOW (ref 32–36)
MCV RBC AUTO: 87.1 FL — SIGNIFICANT CHANGE UP (ref 80–100)
MCV RBC AUTO: 88.8 FL — SIGNIFICANT CHANGE UP (ref 80–100)
MCV RBC AUTO: 90.1 FL — SIGNIFICANT CHANGE UP (ref 80–100)
NRBC # BLD: 0 /100 WBCS — SIGNIFICANT CHANGE UP (ref 0–0)
PLATELET # BLD AUTO: 120 K/UL — LOW (ref 150–400)
PLATELET # BLD AUTO: 95 K/UL — LOW (ref 150–400)
PLATELET # BLD AUTO: 99 K/UL — LOW (ref 150–400)
POTASSIUM SERPL-MCNC: 4.8 MMOL/L — SIGNIFICANT CHANGE UP (ref 3.5–5.3)
POTASSIUM SERPL-SCNC: 4.8 MMOL/L — SIGNIFICANT CHANGE UP (ref 3.5–5.3)
PROT SERPL-MCNC: 5.2 GM/DL — LOW (ref 6–8.3)
RBC # BLD: 2.63 M/UL — LOW (ref 4.2–5.8)
RBC # BLD: 3.47 M/UL — LOW (ref 4.2–5.8)
RBC # BLD: 3.48 M/UL — LOW (ref 4.2–5.8)
RBC # FLD: 17.9 % — HIGH (ref 10.3–14.5)
RBC # FLD: 18.1 % — HIGH (ref 10.3–14.5)
RBC # FLD: 18.4 % — HIGH (ref 10.3–14.5)
SODIUM SERPL-SCNC: 149 MMOL/L — HIGH (ref 135–145)
SPECIMEN SOURCE: SIGNIFICANT CHANGE UP
WBC # BLD: 6.47 K/UL — SIGNIFICANT CHANGE UP (ref 3.8–10.5)
WBC # BLD: 7.36 K/UL — SIGNIFICANT CHANGE UP (ref 3.8–10.5)
WBC # BLD: 7.72 K/UL — SIGNIFICANT CHANGE UP (ref 3.8–10.5)
WBC # FLD AUTO: 6.47 K/UL — SIGNIFICANT CHANGE UP (ref 3.8–10.5)
WBC # FLD AUTO: 7.36 K/UL — SIGNIFICANT CHANGE UP (ref 3.8–10.5)
WBC # FLD AUTO: 7.72 K/UL — SIGNIFICANT CHANGE UP (ref 3.8–10.5)

## 2018-12-06 RX ORDER — MORPHINE SULFATE 50 MG/1
1 CAPSULE, EXTENDED RELEASE ORAL EVERY 6 HOURS
Qty: 0 | Refills: 0 | Status: DISCONTINUED | OUTPATIENT
Start: 2018-12-06 | End: 2018-12-07

## 2018-12-06 RX ORDER — SODIUM CHLORIDE 9 MG/ML
1000 INJECTION INTRAMUSCULAR; INTRAVENOUS; SUBCUTANEOUS
Qty: 0 | Refills: 0 | Status: DISCONTINUED | OUTPATIENT
Start: 2018-12-06 | End: 2018-12-07

## 2018-12-06 RX ORDER — PANTOPRAZOLE SODIUM 20 MG/1
40 TABLET, DELAYED RELEASE ORAL
Qty: 0 | Refills: 0 | Status: DISCONTINUED | OUTPATIENT
Start: 2018-12-06 | End: 2018-12-10

## 2018-12-06 RX ORDER — SALIVA SUBSTITUTE COMB NO.11 351 MG
5 POWDER IN PACKET (EA) MUCOUS MEMBRANE
Qty: 0 | Refills: 0 | Status: DISCONTINUED | OUTPATIENT
Start: 2018-12-06 | End: 2018-12-08

## 2018-12-06 RX ADMIN — MEROPENEM 100 MILLIGRAM(S): 1 INJECTION INTRAVENOUS at 22:09

## 2018-12-06 RX ADMIN — SODIUM CHLORIDE 100 MILLILITER(S): 9 INJECTION INTRAMUSCULAR; INTRAVENOUS; SUBCUTANEOUS at 20:17

## 2018-12-06 RX ADMIN — PANTOPRAZOLE SODIUM 40 MILLIGRAM(S): 20 TABLET, DELAYED RELEASE ORAL at 16:43

## 2018-12-06 RX ADMIN — MORPHINE SULFATE 1 MILLIGRAM(S): 50 CAPSULE, EXTENDED RELEASE ORAL at 16:37

## 2018-12-06 RX ADMIN — MEROPENEM 100 MILLIGRAM(S): 1 INJECTION INTRAVENOUS at 13:18

## 2018-12-06 RX ADMIN — MEROPENEM 100 MILLIGRAM(S): 1 INJECTION INTRAVENOUS at 10:18

## 2018-12-06 RX ADMIN — Medication 5 MILLILITER(S): at 16:43

## 2018-12-06 RX ADMIN — MORPHINE SULFATE 1 MILLIGRAM(S): 50 CAPSULE, EXTENDED RELEASE ORAL at 14:27

## 2018-12-06 RX ADMIN — PANTOPRAZOLE SODIUM 10 MG/HR: 20 TABLET, DELAYED RELEASE ORAL at 06:03

## 2018-12-06 RX ADMIN — MORPHINE SULFATE 1 MILLIGRAM(S): 50 CAPSULE, EXTENDED RELEASE ORAL at 22:18

## 2018-12-06 NOTE — PROVIDER CONTACT NOTE (OTHER) - SITUATION
IV team was able to place one PIV, PRBC infusing as ordered. Unable to obtain any other access, therefore unable to give merropenem and protonix.

## 2018-12-06 NOTE — PROGRESS NOTE ADULT - SUBJECTIVE AND OBJECTIVE BOX
Subjective:   HPI (12/5/2018) 79 y/o male with CAD, hyperlipidemia, GERD, metastatic prostate CA with peritoneal carcinomatosis, neurogenic bladder s/p b/l nephrostomies, AAA, presents to the ED for further evaluation of episodes of 'coffee ground emesis.' As per EMS, NH, and ED documentation (patient unable to provide significant HPI due to current medical condition) the patient was notably hypotensive at [place] (60/42) and upon arrival to  (BP 84/68 in triage). Labs => Hgb/Hct 9.7/32.5 -> 8.4/27.9, , INR 1.74, Na 147, BUN/Cr 52/1.76, Alb 2.5, , , UA (+). In the ED the patient was given NS x 3L, started on a PPI gtt per protocol, and given Piperacillin/tazobactam 3.375g IVPB x 1.     12/6/2018: Patient seen and examined at bedside with medical team. Patient receiving 1 unit PRBC this AM. Patient looked visibly uncomfortable during exam.       REVIEW OF SYSTEMS:  CONSTITUTIONAL: No weakness, fevers or chills  EYES/ENT: No visual changes;  No vertigo or throat pain   NECK: No pain or stiffness  RESPIRATORY: No cough, wheezing, hemoptysis; No shortness of breath  CARDIOVASCULAR: No chest pain or palpitations  GASTROINTESTINAL: No abdominal or epigastric pain. No nausea, vomiting, or hematemesis; No diarrhea or constipation. No melena or hematochezia.  GENITOURINARY: No dysuria, frequency or hematuria  NEUROLOGICAL: No numbness or weakness  SKIN: No itching, burning, rashes, or lesions   All other review of systems is negative unless indicated above    Vital Signs Last 24 Hrs  T(C): 36.2 (06 Dec 2018 11:29), Max: 36.6 (06 Dec 2018 07:01)  T(F): 97.1 (06 Dec 2018 11:29), Max: 97.9 (06 Dec 2018 07:01)  HR: 92 (06 Dec 2018 11:29) (72 - 102)  BP: 100/55 (06 Dec 2018 11:29) (85/63 - 117/83)  BP(mean): --  RR: 18 (06 Dec 2018 11:29) (16 - 18)  SpO2: 94% (06 Dec 2018 11:29) (94% - 99%)    I&O's Summary    05 Dec 2018 07:01  -  06 Dec 2018 07:00  --------------------------------------------------------  IN: 100 mL / OUT: 230 mL / NET: -130 mL    06 Dec 2018 07:01  -  06 Dec 2018 14:02  --------------------------------------------------------  IN: 433 mL / OUT: 0 mL / NET: 433 mL        CAPILLARY BLOOD GLUCOSE          PHYSICAL EXAM:  Constitutional: NAD, awake and alert, well-developed  HEENT: PERR, EOMI, Normal Hearing, MMM  Neck: Soft and supple, No LAD, No JVD  Respiratory: Breath sounds are clear bilaterally, No wheezing, rales or rhonchi  Cardiovascular: S1 and S2, regular rate and rhythm, no Murmurs, gallops or rubs  Gastrointestinal: Bowel Sounds present, soft, nontender, nondistended, no guarding, no rebound  Extremities: No peripheral edema  Vascular: 2+ peripheral pulses  Neurological: A/O x 3, no focal deficits  Musculoskeletal: 5/5 strength b/l upper and lower extremities  Skin: No rashes    MEDICATIONS:  MEDICATIONS  (STANDING):  meropenem  IVPB 500 milliGRAM(s) IV Intermittent every 8 hours  pantoprazole  Injectable 40 milliGRAM(s) IV Push two times a day      LABS: All Labs Reviewed:                        9.5    7.72  )-----------( 120      ( 06 Dec 2018 11:12 )             30.8     12-06    149<H>  |  115<H>  |  53<H>  ----------------------------<  86  4.8   |  25  |  1.58<H>    Ca    8.0<L>      06 Dec 2018 04:28  Mg     2.4     12-06    TPro  5.2<L>  /  Alb  2.0<L>  /  TBili  0.7  /  DBili  x   /  AST  136<H>  /  ALT  34  /  AlkPhos  423<H>  12-06    PT/INR - ( 05 Dec 2018 11:21 )   PT: 19.6 sec;   INR: 1.74 ratio         PTT - ( 05 Dec 2018 11:21 )  PTT:31.8 sec  CARDIAC MARKERS ( 05 Dec 2018 11:21 )  0.030 ng/mL / x     / x     / x     / x          Blood Culture:     RADIOLOGY/EKG:    DVT PPX:    ADVANCED DIRECTIVE:    DISPOSITION: Subjective:   HPI (12/5/2018) 77 y/o male with CAD, hyperlipidemia, GERD, metastatic prostate CA with peritoneal carcinomatosis, neurogenic bladder s/p b/l nephrostomies, AAA, presents to the ED for further evaluation of episodes of 'coffee ground emesis.' As per EMS, NH, and ED documentation (patient unable to provide significant HPI due to current medical condition) the patient was notably hypotensive at [place] (60/42) and upon arrival to  (BP 84/68 in triage). Labs => Hgb/Hct 9.7/32.5 -> 8.4/27.9, , INR 1.74, Na 147, BUN/Cr 52/1.76, Alb 2.5, , , UA (+). In the ED the patient was given NS x 3L, started on a PPI gtt per protocol, and given Piperacillin/tazobactam 3.375g IVPB x 1.     12/6/2018: Patient seen and examined at bedside with medical team. Patient receiving 1 unit PRBC this AM. Patient looked visibly uncomfortable during exam, but was able to say that he was not having stomach pain. Patient oriented to person.       REVIEW OF SYSTEMS: unable to assess at the time of interview    Vital Signs Last 24 Hrs  T(C): 36.2 (06 Dec 2018 11:29), Max: 36.6 (06 Dec 2018 07:01)  T(F): 97.1 (06 Dec 2018 11:29), Max: 97.9 (06 Dec 2018 07:01)  HR: 92 (06 Dec 2018 11:29) (72 - 102)  BP: 100/55 (06 Dec 2018 11:29) (85/63 - 117/83)  BP(mean): --  RR: 18 (06 Dec 2018 11:29) (16 - 18)  SpO2: 94% (06 Dec 2018 11:29) (94% - 99%)    I&O's Summary    05 Dec 2018 07:01  -  06 Dec 2018 07:00  --------------------------------------------------------  IN: 100 mL / OUT: 230 mL / NET: -130 mL    06 Dec 2018 07:01  -  06 Dec 2018 14:02  --------------------------------------------------------  IN: 433 mL / OUT: 0 mL / NET: 433 mL      PHYSICAL EXAM:  Constitutional: NAD, awake, thin, elderly man; lying on left side in hospital bed  HEENT: PERR, EOMI, dry mucous membranes  Respiratory: decreased inspiratory effort; no rhonci or wheezes appreciated  Cardiovascular: S1 and S2, regular rate and rhythm, no Murmurs, gallops or rubs  Gastrointestinal: Bowel Sounds present, soft, nondistended, no guarding  Extremities: thin extremities; excoriations noted on b/l lower extremities  Neurological: A/O to person; unable to assess focal deficits  Musculoskeletal: unable to assess as patient not following commands; moving all four limbs independently  Skin: No rashes    MEDICATIONS:  MEDICATIONS  (STANDING):  meropenem  IVPB 500 milliGRAM(s) IV Intermittent every 8 hours  pantoprazole  Injectable 40 milliGRAM(s) IV Push two times a day      LABS: All Labs Reviewed:                        9.5    7.72  )-----------( 120      ( 06 Dec 2018 11:12 )             30.8     12-06    149<H>  |  115<H>  |  53<H>  ----------------------------<  86  4.8   |  25  |  1.58<H>    Ca    8.0<L>      06 Dec 2018 04:28  Mg     2.4     12-06    TPro  5.2<L>  /  Alb  2.0<L>  /  TBili  0.7  /  DBili  x   /  AST  136<H>  /  ALT  34  /  AlkPhos  423<H>  12-06    PT/INR - ( 05 Dec 2018 11:21 )   PT: 19.6 sec;   INR: 1.74 ratio         PTT - ( 05 Dec 2018 11:21 )  PTT:31.8 sec  CARDIAC MARKERS ( 05 Dec 2018 11:21 )  0.030 ng/mL / x     / x     / x     / x            ADVANCED DIRECTIVE: DNR/DNI

## 2018-12-06 NOTE — CONSULT NOTE ADULT - ASSESSMENT
77 y/o male with CAD, hyperlipidemia, GERD, metastatic prostate CA with peritoneal carcinomatosis, neurogenic bladder s/p b/l nephrostomies, AAA, admitted from snf on 12/5 for evaluation of vomiting coffee ground emesis; patient was hypotensive at sn[facility]. History per medical record as patient unable to provide history.   1. Patient admitted with vomiting possibly upper GI bleed; found to have pyuria, to rule out UTI, especially given history of resistant bacteria in urine in past  - follow up cultures   - GI evaluation in progress  - iv hydration and supportive care   - serial cbc and monitor temperature   - reviewed prior medical records to evaluate for resistant or atypical pathogens   - agree with meropenem as ordered  - continue contact isolation  - consideration for palliative care evaluation  2. other issues: CAD, hyperlipidemia, GERD, metastatic prostate CA with peritoneal carcinomatosis, neurogenic bladder s/p b/l nephrostomies, AAA,  - per medicine

## 2018-12-06 NOTE — PATIENT PROFILE ADULT - DO YOU FEEL LIKE HURTING YOURSELF OR OTHERS?
Discussed fall precautions with patient and his wife  Recommended to use a cane or a walker for ambulation to prevent falls  no

## 2018-12-06 NOTE — CONSULT NOTE ADULT - SUBJECTIVE AND OBJECTIVE BOX
HPI:  77 y/o male with CAD, hyperlipidemia, GERD, metastatic prostate CA with peritoneal carcinomatosis, neurogenic bladder s/p b/l nephrostomies, AAA, presents to the ED for further evaluation of episodes of 'coffee ground emesis.' As per EMS, NH, and ED documentation (patient unable to provide significant HPI due to current medical condition) the patient was notably hypotensive at [facility] (60/42) and upon arrival to  (BP 84/68 in triage). Labs => Hgb/Hct 9.7/32.5 -> 8.4/27.9, , INR 1.74, Na 147, BUN/Cr 52/1.76, Alb 2.5, , , UA (+). In the ED the patient was given NS x 3L, started on a PPI gtt per protocol, and given Piperacillin/tazobactam 3.375g IVPB x 1. (05 Dec 2018 19:46)      PAST MEDICAL & SURGICAL HISTORY:  CAD (coronary artery disease)  HLD (hyperlipidemia)  GERD (gastroesophageal reflux disease)  Colon obstruction  Prostate CA  Gallstones  Neurogenic bladder  Carotid stenosis: right and left  AAA (abdominal aortic aneurysm): 2010  Left rib fracture  S/P cataract surgery, right  H/O hemicolectomy  History of intestinal surgery  History of cholecystectomy  THR (Total Hip Replacement)      MEDICATIONS  (STANDING):  meropenem  IVPB 500 milliGRAM(s) IV Intermittent every 8 hours  pantoprazole  Injectable 40 milliGRAM(s) IV Push two times a day    MEDICATIONS  (PRN):  Biotene Dry Mouth Oral Rinse 5 milliLiter(s) Swish and Spit four times a day PRN Mouth Care  morphine  - Injectable 1 milliGRAM(s) IV Push every 6 hours PRN Moderate Pain (4 - 6)  ondansetron Injectable 4 milliGRAM(s) IV Push every 6 hours PRN Nausea      Allergies    morphine (Vomiting)  sulfa drugs (Other)    Intolerances        SOCIAL HISTORY:    FAMILY HISTORY:  Family history of cerebrovascular accident (CVA) (Father, Mother)   Non-contributory    REVIEW OF SYSTEMS      General:	    Respiratory and Thorax:  	  Cardiovascular:	    Gastrointestinal:	    Musculoskeletal:	   Vital Signs Last 24 Hrs  T(C): 36.2 (06 Dec 2018 11:29), Max: 36.6 (06 Dec 2018 07:01)  T(F): 97.1 (06 Dec 2018 11:29), Max: 97.9 (06 Dec 2018 07:01)  HR: 98 (06 Dec 2018 14:38) (80 - 102)  BP: 98/52 (06 Dec 2018 14:38) (95/43 - 106/62)  BP(mean): --  RR: 18 (06 Dec 2018 11:29) (16 - 18)  SpO2: 94% (06 Dec 2018 11:29) (94% - 99%)    HEENT :No Pallor.No icterus. EOMI,PERLAA  Chest : Clear to Auscultation  CVS : S1S2 Normal.No murmurs.  Abdomen: Soft.Non tender .Normal bowel sounds.No Organomegaly.  CNS: Alert.Oriented to Time,Place and Person.No focal deficit.  EXT: Normal Range of motion.No pitting edema.    LABS:                        9.5    7.72  )-----------( 120      ( 06 Dec 2018 11:12 )             30.8     12-06    149<H>  |  115<H>  |  53<H>  ----------------------------<  86  4.8   |  25  |  1.58<H>    Ca    8.0<L>      06 Dec 2018 04:28  Mg     2.4     12-06    TPro  5.2<L>  /  Alb  2.0<L>  /  TBili  0.7  /  DBili  x   /  AST  136<H>  /  ALT  34  /  AlkPhos  423<H>  12-06    PT/INR - ( 05 Dec 2018 11:21 )   PT: 19.6 sec;   INR: 1.74 ratio         PTT - ( 05 Dec 2018 11:21 )  PTT:31.8 sec  LIVER FUNCTIONS - ( 06 Dec 2018 04:28 )  Alb: 2.0 g/dL / Pro: 5.2 gm/dL / ALK PHOS: 423 U/L / ALT: 34 U/L / AST: 136 U/L / GGT: x             RADIOLOGY & ADDITIONAL STUDIES:

## 2018-12-06 NOTE — CONSULT NOTE ADULT - SUBJECTIVE AND OBJECTIVE BOX
Patient is a 78y old  Male who presents with a chief complaint of GI Bleeding (05 Dec 2018 19:46)    HPI:  79 y/o male with CAD, hyperlipidemia, GERD, metastatic prostate CA with peritoneal carcinomatosis, neurogenic bladder s/p b/l nephrostomies, AAA, admitted from [facility] on  for evaluation of vomiting coffee ground emesis; patient was hypotensive at [facility]. History per medical record as patient unable to provide history.                PMH: as above  PSH: as above  Meds: per reconciliation sheet, noted below  MEDICATIONS  (STANDING):  meropenem  IVPB 500 milliGRAM(s) IV Intermittent every 8 hours  pantoprazole  Injectable 40 milliGRAM(s) IV Push two times a day    MEDICATIONS  (PRN):  ondansetron Injectable 4 milliGRAM(s) IV Push every 6 hours PRN Nausea    Allergies    morphine (Vomiting)  sulfa drugs (Other)    Intolerances      Social: no smoking, no alcohol, no illegal drugs; no recent travel, no exposure to TB  FAMILY HISTORY:  Family history of cerebrovascular accident (CVA) (Father, Mother)     no history of premature cardiovascular disease in first degree relatives  ROS: unable to obtain secondary to patient medical condition     Vital Signs Last 24 Hrs  T(C): 36.3 (06 Dec 2018 10:15), Max: 36.6 (06 Dec 2018 07:01)  T(F): 97.3 (06 Dec 2018 10:15), Max: 97.9 (06 Dec 2018 07:01)  HR: 80 (06 Dec 2018 10:15) (72 - 102)  BP: 95/43 (06 Dec 2018 10:15) (85/63 - 117/83)  BP(mean): --  RR: 18 (06 Dec 2018 10:15) (16 - 18)  SpO2: 99% (06 Dec 2018 10:15) (94% - 99%)  Daily     Daily     PE:    Constitutional: frail looking  HEENT: NC/AT, EOMI, PERRLA, conjunctivae clear; ears and nose atraumatic; pharynx clear; temporal wasting  Neck: supple; thyroid not palpable  Back: no tenderness  Respiratory: respiratory effort normal; clear to auscultation  Cardiovascular: S1S2 regular, no murmurs  Abdomen: soft, not tender, not distended, positive BS; no liver or spleen organomegaly  Genitourinary: no suprapubic tenderness; bilateral PCN  Musculoskeletal: no muscle tenderness, no joint swelling or tenderness  Neurological/ Psychiatric:  moving all extremities  Skin: no rashes; no palpable lesions    Labs: all available labs reviewed                        9.5    7.72  )-----------( 120      ( 06 Dec 2018 11:12 )             30.8         149<H>  |  115<H>  |  53<H>  ----------------------------<  86  4.8   |  25  |  1.58<H>    Ca    8.0<L>      06 Dec 2018 04:28  Mg     2.4         TPro  5.2<L>  /  Alb  2.0<L>  /  TBili  0.7  /  DBili  x   /  AST  136<H>  /  ALT  34  /  AlkPhos  423<H>       LIVER FUNCTIONS - ( 06 Dec 2018 04:28 )  Alb: 2.0 g/dL / Pro: 5.2 gm/dL / ALK PHOS: 423 U/L / ALT: 34 U/L / AST: 136 U/L / GGT: x           Urinalysis Basic - ( 05 Dec 2018 13:36 )    Color: Suze / Appearance: very cloudy / S.010 / pH: x  Gluc: x / Ketone: Small  / Bili: Negative / Urobili: 1 mg/dL   Blood: x / Protein: 500 mg/dL / Nitrite: Positive   Leuk Esterase: Moderate / RBC: 11-25 /HPF / WBC >50   Sq Epi: x / Non Sq Epi: x / Bacteria: Moderate      < from: CT Angio Abdomen and Pelvis w/ IV Cont (18 @ 14:05) >    EXAM:  CT ANGIO ABD PELVIS (W)AW IC                          EXAM:  CTA CHEST DISSECTION (W)AW IC                            PROCEDURE DATE:  2018          INTERPRETATION:  CLINICAL INFORMATION: 78-year-old man with hematemesis.    COMPARISON: CT scans 2018, 2018, and 2016    PROCEDURE:   CTA of the Chest, Abdomen and Pelvis was performed with intravenous   contrast.   Intravenous contrast: 90 ml Omnipaque 350. 10 ml discarded.  Oral contrast: None.  Sagittal and coronal reformats were performed. 3-D (MIP) reconstructions   were performed.    FINDINGS:    CHEST:     LUNGS AND LARGE AIRWAYS: Patent central airways. No pulmonary nodules.   Passive atelectasis is present in the left lateral upper and lower lobes   adjacent to old rib fracture.  PLEURA: No pleural effusion or pneumothorax.  VESSELS: No evidence of aortic dissection or intramural hematoma. The   ascending aorta is mildly dilated, measuring 4.8 cm.  HEART: Heart size is normal. No pericardial effusion.  MEDIASTINUM AND STEWART: Right hilar adenopathy is present, the largest   lymph node in the right hilum measuring 2.3 x 2.0 cm is unchanged   compared to prior exam. There is fluid distention of the esophagus.  CHEST WALL AND LOWER NECK: Within normal limits.    ABDOMEN AND PELVIS:    LIVER: A right hepatic lobe cyst measuring 1.7 cm is without significant   change since 2016. There is heterogeneous enhancement of the liver.   Previously noted lesion in the right hepatic lobe     Multiple vagueliver lesions are now visible as compared to prior   examination 2018 the most prominent measuring 1.2 cm in the right   hepatic lobe (4:58) and 1.4 cm in the left hepatic lobe (4:74).   Peripheral enhancement is seen in association with the lesion in the   right hepatic lobe. There is heterogeneous enhancement adjacent to the   gallbladder fossa. Findings may reflect metastatic disease. In the   appropriate clinical context, liver abscess may also be considered. The   exophytic component of the previously noted right hepatic lobe lesion is   reidentified. Margins of this lesion are difficult to assess on the   current exam.    BILE DUCTS: Pneumobilia is present.  GALLBLADDER: Surgically removed.  SPLEEN: Within normal limits.  PANCREAS: Within normal limits.  ADRENALS: There is nonspecific thickening of the left adrenal gland. A   right adrenal calcification measures 7 mm.  KIDNEYS/URETERS: Bilateral percutaneous nephrostomy tubes are present.   There is no hydronephrosis.    BLADDER: Diffuse thickening of the bladder wall.  REPRODUCTIVE ORGANS: Prostate is enlarged, with a prominent central lobe.   Findings are partially obscured due to metallic streak artifact from the   left hip arthroplasty.    BOWEL: No bowel obstruction. Appendix is not visualized. Confluent   enhancing mesenteric soft tissue consistent with peritoneal disease is   reidentified (4:103)  PERITONEUM: Trace abdominal and pelvic free fluid.  VESSELS:  Atheromatous changes are present. Focal stable dissection of   the infrarenal abdominal aorta (4:90). At the level of the distal   stomach, a rounded region of enhancement is present measuring 9 mm in   diameter (4:80, 605:50, and 604:124) along the posterior gastric antrum.   This appears to be extrinsic to the stomach, and is favored to be a   weakly enhancing lymph node.     RETROPERITONEUM: Confluent adenopathy surrounding the aorta, SMA, and   iliac vessels measuring up to 7.5 x 4.2 cm (4:90) at the level of the   infrarenal aorta is without significant change.    ABDOMINAL WALL: Within normal limits.  BONES: Left hip arthroplasty. Degenerative changes of the spine, which   indicates mild curvature. Compression deformity of the T12 vertebral body   is reidentified.    IMPRESSION:   *  Regionof enhancement measuring 9 mm in diameter and on arterial phase   images along the posterior aspect of the gastric antrum. This lesion   appears extrinsic to the stomach, and is favored to be a weakly enhancing   lymph node rather than evidence of active GI bleed. No definitive   evidence of active GI bleed on arterial phase images.  *  Extensive lymphadenopathy in the chest, abdomen, and pelvis with   findings of peritoneal carcinomatosis.  *  Heterogeneous enhancement of the liver with multipleliver lesions   which are more apparent on the current exam, concerning for metastatic   disease. In the appropriate clinical context, liver abscess may also be   considered.      < end of copied text >      Radiology: all available radiological tests reviewed    Advanced directives addressed: full resuscitation

## 2018-12-06 NOTE — PROGRESS NOTE ADULT - ASSESSMENT
77 y/o male with CAD, hyperlipidemia, GERD, metastatic prostate CA with peritoneal carcinomatosis, neurogenic bladder s/p b/l nephrostomies, AAA, presents to the ED for further evaluation of episodes of 'coffee ground emesis.' Patient found to be hypotensive 2/2 to upper GI bleed.     #Upper GI Bleed  -  Hg 8.4 -> 7.1 this  AM  - transfused 1 unit PRBC this Am Hg 7.1 ->9.5 post transfusion  - NPO d/c, start clear liquid diet  - Protonix 40mg IV push BID (unable to have protonix drip 2/2 lack of IV access)  - H&H q6 hours; transfuse PRN Hg <8 in setting of CAD  - GI recommendations appreciated: planned EGD in AM  - spoke with daughter Berta Ghotra- agrees with plan for EGD and transfusion as needed    #hypotension 2/2 GI bleed  - improving  - s/p 3L NS in ED  - start NS @100cc/hr (maintenance fluid)  - Vital signs q8  - has h/o hypertension: will hold home metoprolol 25mg TID in the setting of hypotension.     # UTI  - UA + in ED  - s/p Meropenem 500mg IV  and Zosyn  - continue Meropenum 500mg TID - day 2 of Abx  - Urine culture pending  - ID recommendations appreciated    #Pre- Renal VIJAY  - Cr 1.76 in ED, 1.52 this AM  - BUN/CR >20  - Cr baseline 0.92  - Ns@100cc/hr  - recheck BMP in AM    #Hypocalcemia in setting of hypoalbuminemia  - Ca 8.0; corrected Ca 9.6  - no repletion needed at this time.     #Advanced Directives  - DNR/DNI  - Family now ok with central line if needed for IVF

## 2018-12-07 LAB
ADD ON TEST-SPECIMEN IN LAB: SIGNIFICANT CHANGE UP
ANION GAP SERPL CALC-SCNC: 10 MMOL/L — SIGNIFICANT CHANGE UP (ref 5–17)
ANION GAP SERPL CALC-SCNC: 9 MMOL/L — SIGNIFICANT CHANGE UP (ref 5–17)
BUN SERPL-MCNC: 40 MG/DL — HIGH (ref 7–23)
BUN SERPL-MCNC: 43 MG/DL — HIGH (ref 7–23)
CALCIUM SERPL-MCNC: 7.6 MG/DL — LOW (ref 8.5–10.1)
CALCIUM SERPL-MCNC: 8.4 MG/DL — LOW (ref 8.5–10.1)
CHLORIDE SERPL-SCNC: 121 MMOL/L — HIGH (ref 96–108)
CHLORIDE SERPL-SCNC: 122 MMOL/L — HIGH (ref 96–108)
CO2 SERPL-SCNC: 24 MMOL/L — SIGNIFICANT CHANGE UP (ref 22–31)
CO2 SERPL-SCNC: 24 MMOL/L — SIGNIFICANT CHANGE UP (ref 22–31)
CREAT SERPL-MCNC: 1.26 MG/DL — SIGNIFICANT CHANGE UP (ref 0.5–1.3)
CREAT SERPL-MCNC: 1.4 MG/DL — HIGH (ref 0.5–1.3)
GLUCOSE SERPL-MCNC: 229 MG/DL — HIGH (ref 70–99)
GLUCOSE SERPL-MCNC: 86 MG/DL — SIGNIFICANT CHANGE UP (ref 70–99)
HCT VFR BLD CALC: 25.9 % — LOW (ref 39–50)
HCT VFR BLD CALC: 29.1 % — LOW (ref 39–50)
HGB BLD-MCNC: 7.9 G/DL — LOW (ref 13–17)
HGB BLD-MCNC: 8.8 G/DL — LOW (ref 13–17)
MCHC RBC-ENTMCNC: 27.1 PG — SIGNIFICANT CHANGE UP (ref 27–34)
MCHC RBC-ENTMCNC: 27.2 PG — SIGNIFICANT CHANGE UP (ref 27–34)
MCHC RBC-ENTMCNC: 30.2 GM/DL — LOW (ref 32–36)
MCHC RBC-ENTMCNC: 30.5 GM/DL — LOW (ref 32–36)
MCV RBC AUTO: 88.7 FL — SIGNIFICANT CHANGE UP (ref 80–100)
MCV RBC AUTO: 89.8 FL — SIGNIFICANT CHANGE UP (ref 80–100)
NRBC # BLD: 0 /100 WBCS — SIGNIFICANT CHANGE UP (ref 0–0)
NRBC # BLD: 0 /100 WBCS — SIGNIFICANT CHANGE UP (ref 0–0)
PLAT MORPH BLD: NORMAL — SIGNIFICANT CHANGE UP
PLATELET # BLD AUTO: 79 K/UL — LOW (ref 150–400)
PLATELET # BLD AUTO: 96 K/UL — LOW (ref 150–400)
POTASSIUM SERPL-MCNC: 2.8 MMOL/L — CRITICAL LOW (ref 3.5–5.3)
POTASSIUM SERPL-MCNC: 3.6 MMOL/L — SIGNIFICANT CHANGE UP (ref 3.5–5.3)
POTASSIUM SERPL-SCNC: 2.8 MMOL/L — CRITICAL LOW (ref 3.5–5.3)
POTASSIUM SERPL-SCNC: 3.6 MMOL/L — SIGNIFICANT CHANGE UP (ref 3.5–5.3)
RBC # BLD: 2.92 M/UL — LOW (ref 4.2–5.8)
RBC # BLD: 3.24 M/UL — LOW (ref 4.2–5.8)
RBC # FLD: 18.3 % — HIGH (ref 10.3–14.5)
RBC # FLD: 18.3 % — HIGH (ref 10.3–14.5)
RBC BLD AUTO: NORMAL — SIGNIFICANT CHANGE UP
SODIUM SERPL-SCNC: 154 MMOL/L — HIGH (ref 135–145)
SODIUM SERPL-SCNC: 156 MMOL/L — HIGH (ref 135–145)
TROPONIN I SERPL-MCNC: 0.07 NG/ML — HIGH (ref 0.01–0.04)
WBC # BLD: 5.04 K/UL — SIGNIFICANT CHANGE UP (ref 3.8–10.5)
WBC # BLD: 6.04 K/UL — SIGNIFICANT CHANGE UP (ref 3.8–10.5)
WBC # FLD AUTO: 5.04 K/UL — SIGNIFICANT CHANGE UP (ref 3.8–10.5)
WBC # FLD AUTO: 6.04 K/UL — SIGNIFICANT CHANGE UP (ref 3.8–10.5)

## 2018-12-07 PROCEDURE — 93010 ELECTROCARDIOGRAM REPORT: CPT

## 2018-12-07 RX ORDER — POTASSIUM CHLORIDE 20 MEQ
10 PACKET (EA) ORAL
Qty: 0 | Refills: 0 | Status: DISCONTINUED | OUTPATIENT
Start: 2018-12-07 | End: 2018-12-07

## 2018-12-07 RX ORDER — POTASSIUM CHLORIDE 20 MEQ
20 PACKET (EA) ORAL
Qty: 0 | Refills: 0 | Status: DISCONTINUED | OUTPATIENT
Start: 2018-12-07 | End: 2018-12-07

## 2018-12-07 RX ORDER — HYDROMORPHONE HYDROCHLORIDE 2 MG/ML
0.5 INJECTION INTRAMUSCULAR; INTRAVENOUS; SUBCUTANEOUS ONCE
Qty: 0 | Refills: 0 | Status: DISCONTINUED | OUTPATIENT
Start: 2018-12-07 | End: 2018-12-07

## 2018-12-07 RX ORDER — SODIUM CHLORIDE 9 MG/ML
500 INJECTION, SOLUTION INTRAVENOUS
Qty: 0 | Refills: 0 | Status: DISCONTINUED | OUTPATIENT
Start: 2018-12-07 | End: 2018-12-07

## 2018-12-07 RX ORDER — HYDROMORPHONE HYDROCHLORIDE 2 MG/ML
0.5 INJECTION INTRAMUSCULAR; INTRAVENOUS; SUBCUTANEOUS
Qty: 0 | Refills: 0 | Status: DISCONTINUED | OUTPATIENT
Start: 2018-12-07 | End: 2018-12-08

## 2018-12-07 RX ORDER — SODIUM CHLORIDE 9 MG/ML
1000 INJECTION, SOLUTION INTRAVENOUS
Qty: 0 | Refills: 0 | Status: DISCONTINUED | OUTPATIENT
Start: 2018-12-07 | End: 2018-12-07

## 2018-12-07 RX ORDER — POTASSIUM CHLORIDE 20 MEQ
10 PACKET (EA) ORAL ONCE
Qty: 0 | Refills: 0 | Status: DISCONTINUED | OUTPATIENT
Start: 2018-12-07 | End: 2018-12-07

## 2018-12-07 RX ORDER — LACTOBACILLUS ACIDOPHILUS 100MM CELL
1 CAPSULE ORAL DAILY
Qty: 0 | Refills: 0 | Status: DISCONTINUED | OUTPATIENT
Start: 2018-12-07 | End: 2018-12-08

## 2018-12-07 RX ORDER — POTASSIUM CHLORIDE 20 MEQ
10 PACKET (EA) ORAL
Qty: 0 | Refills: 0 | Status: COMPLETED | OUTPATIENT
Start: 2018-12-07 | End: 2018-12-07

## 2018-12-07 RX ORDER — MORPHINE SULFATE 50 MG/1
1 CAPSULE, EXTENDED RELEASE ORAL ONCE
Qty: 0 | Refills: 0 | Status: DISCONTINUED | OUTPATIENT
Start: 2018-12-07 | End: 2018-12-07

## 2018-12-07 RX ORDER — FENTANYL CITRATE 50 UG/ML
1 INJECTION INTRAVENOUS
Qty: 0 | Refills: 0 | Status: DISCONTINUED | OUTPATIENT
Start: 2018-12-07 | End: 2018-12-09

## 2018-12-07 RX ORDER — DEXTROSE MONOHYDRATE, SODIUM CHLORIDE, AND POTASSIUM CHLORIDE 50; .745; 4.5 G/1000ML; G/1000ML; G/1000ML
1000 INJECTION, SOLUTION INTRAVENOUS
Qty: 0 | Refills: 0 | Status: DISCONTINUED | OUTPATIENT
Start: 2018-12-07 | End: 2018-12-07

## 2018-12-07 RX ORDER — MORPHINE SULFATE 50 MG/1
2 CAPSULE, EXTENDED RELEASE ORAL EVERY 6 HOURS
Qty: 0 | Refills: 0 | Status: DISCONTINUED | OUTPATIENT
Start: 2018-12-07 | End: 2018-12-07

## 2018-12-07 RX ADMIN — SODIUM CHLORIDE 100 MILLILITER(S): 9 INJECTION INTRAMUSCULAR; INTRAVENOUS; SUBCUTANEOUS at 07:02

## 2018-12-07 RX ADMIN — FENTANYL CITRATE 1 PATCH: 50 INJECTION INTRAVENOUS at 11:37

## 2018-12-07 RX ADMIN — SODIUM CHLORIDE 125 MILLILITER(S): 9 INJECTION, SOLUTION INTRAVENOUS at 19:06

## 2018-12-07 RX ADMIN — Medication 100 MILLIEQUIVALENT(S): at 23:47

## 2018-12-07 RX ADMIN — MORPHINE SULFATE 1 MILLIGRAM(S): 50 CAPSULE, EXTENDED RELEASE ORAL at 08:42

## 2018-12-07 RX ADMIN — SODIUM CHLORIDE 125 MILLILITER(S): 9 INJECTION, SOLUTION INTRAVENOUS at 16:56

## 2018-12-07 RX ADMIN — HYDROMORPHONE HYDROCHLORIDE 0.5 MILLIGRAM(S): 2 INJECTION INTRAMUSCULAR; INTRAVENOUS; SUBCUTANEOUS at 22:49

## 2018-12-07 RX ADMIN — SODIUM CHLORIDE 125 MILLILITER(S): 9 INJECTION, SOLUTION INTRAVENOUS at 14:38

## 2018-12-07 RX ADMIN — MORPHINE SULFATE 1 MILLIGRAM(S): 50 CAPSULE, EXTENDED RELEASE ORAL at 11:18

## 2018-12-07 RX ADMIN — HYDROMORPHONE HYDROCHLORIDE 0.5 MILLIGRAM(S): 2 INJECTION INTRAMUSCULAR; INTRAVENOUS; SUBCUTANEOUS at 11:35

## 2018-12-07 RX ADMIN — MORPHINE SULFATE 1 MILLIGRAM(S): 50 CAPSULE, EXTENDED RELEASE ORAL at 04:05

## 2018-12-07 RX ADMIN — PANTOPRAZOLE SODIUM 40 MILLIGRAM(S): 20 TABLET, DELAYED RELEASE ORAL at 04:38

## 2018-12-07 RX ADMIN — HYDROMORPHONE HYDROCHLORIDE 0.5 MILLIGRAM(S): 2 INJECTION INTRAMUSCULAR; INTRAVENOUS; SUBCUTANEOUS at 12:14

## 2018-12-07 RX ADMIN — FENTANYL CITRATE 1 PATCH: 50 INJECTION INTRAVENOUS at 18:00

## 2018-12-07 RX ADMIN — PANTOPRAZOLE SODIUM 40 MILLIGRAM(S): 20 TABLET, DELAYED RELEASE ORAL at 17:40

## 2018-12-07 RX ADMIN — SODIUM CHLORIDE 100 MILLILITER(S): 9 INJECTION INTRAMUSCULAR; INTRAVENOUS; SUBCUTANEOUS at 11:40

## 2018-12-07 RX ADMIN — SODIUM CHLORIDE 500 MILLILITER(S): 9 INJECTION, SOLUTION INTRAVENOUS at 16:56

## 2018-12-07 RX ADMIN — SODIUM CHLORIDE 1000 MILLILITER(S): 9 INJECTION, SOLUTION INTRAVENOUS at 14:29

## 2018-12-07 RX ADMIN — Medication 100 MILLIEQUIVALENT(S): at 22:32

## 2018-12-07 RX ADMIN — SODIUM CHLORIDE 500 MILLILITER(S): 9 INJECTION, SOLUTION INTRAVENOUS at 19:06

## 2018-12-07 RX ADMIN — MEROPENEM 100 MILLIGRAM(S): 1 INJECTION INTRAVENOUS at 04:38

## 2018-12-07 NOTE — CHART NOTE - NSCHARTNOTEFT_GEN_A_CORE
Patient hypotension improving after 2L D5(1/2)NS. BP now 86/42 HR 82.   Patient K+ now 2.8  Hg 7.9    PE:   Patient arousable to voice and touch;   CV: NSR, no MRG  Lungs CTABL; good inspiratory effort  abd: soft, no distended, non-tender  ext: thin extremities, no edema noted    A:  79yo M admitted for acute GI bleed now developed hypotension 2/2 continued GI bleed vs. decreased PO intake     P:   Hold D5(1/2)NS maintenance fluids now in order to prevent fluid overload as patient will be receiving blood products)  3 KCL riders ordered  1 unit PRBC planned transfusion    Plan discussed with Dr. Marte PGY2 and Dr. Sparks Patient hypotension improving after 2L D5(1/2)NS. BP now 86/42 HR 82.   Patient K+ now 2.8  Hg 7.9    PE:   Patient arousable to voice and touch;   CV: NSR, no MRG  Lungs CTABL; good inspiratory effort  abd: soft, no distended, non-tender  ext: mild edema noted on dorsum of left foot; scratches noted b/l LE (present since admission); erythema noted on RUE with mild swelling    A:  77yo M admitted for acute GI bleed now developed hypotension 2/2 continued GI bleed vs. decreased PO intake     P:   Hold D5(1/2)NS maintenance fluids now in order to prevent fluid overload as patient will be receiving blood products)  1 unit PRBC planned transfusion  3 KCL riders ordered after transfusion   Daughter, Berta Ghotra, notified of hypotension and low K+, Hg drop. Agrees with plan to transfuse 1 unit PRBC. She is now open to pressors being used if needed. She plans to visit father later this evening.   Will sign-out that night coverage team follow-up BP overnight and  post transfusion H&H @0100.      Plan discussed with Dr. Marte PGY2 and Dr. Sparks

## 2018-12-07 NOTE — PROGRESS NOTE ADULT - SUBJECTIVE AND OBJECTIVE BOX
Patient is a 78y old  Male who presents with a chief complaint of GI Bleeding (05 Dec 2018 19:46)    Date of service: 12-07-18 @ 13:27    Patient lying in bed; family at bedside        ROS: unable to obtain secondary to patient medical condition     MEDICATIONS  (STANDING):  dextrose 5% + sodium chloride 0.45%. 1000 milliLiter(s) (1000 mL/Hr) IV Continuous <Continuous>  fentaNYL   Patch  12 MICROgram(s)/Hr 1 Patch Transdermal every 72 hours  pantoprazole  Injectable 40 milliGRAM(s) IV Push two times a day    MEDICATIONS  (PRN):  Biotene Dry Mouth Oral Rinse 5 milliLiter(s) Swish and Spit four times a day PRN Mouth Care  morphine  - Injectable 2 milliGRAM(s) IV Push every 6 hours PRN Severe Pain (7 - 10)  ondansetron Injectable 4 milliGRAM(s) IV Push every 6 hours PRN Nausea      Vital Signs Last 24 Hrs  T(C): 36.5 (07 Dec 2018 11:09), Max: 36.6 (07 Dec 2018 04:05)  T(F): 97.7 (07 Dec 2018 11:09), Max: 97.8 (07 Dec 2018 04:05)  HR: 90 (07 Dec 2018 13:18) (88 - 100)  BP: 70/40 (07 Dec 2018 13:18) (70/40 - 116/71)  BP(mean): --  RR: 17 (07 Dec 2018 11:09) (17 - 18)  SpO2: 97% (07 Dec 2018 11:09) (94% - 97%)    Physical Exam:          Constitutional: frail looking  HEENT: NC/AT, EOMI, PERRLA, conjunctivae clear; ears and nose atraumatic; pharynx clear; temporal wasting  Neck: supple; thyroid not palpable  Back: no tenderness  Respiratory: respiratory effort normal; clear to auscultation  Cardiovascular: S1S2 regular, no murmurs  Abdomen: soft, not tender, not distended, positive BS; no liver or spleen organomegaly  Genitourinary: no suprapubic tenderness; bilateral PCN  Musculoskeletal: no muscle tenderness, no joint swelling or tenderness  Neurological/ Psychiatric:  moving all extremities  Skin: no rashes; no palpable lesions    Labs: all available labs reviewed                         Labs:                        8.8    6.04  )-----------( 96       ( 07 Dec 2018 08:22 )             29.1     12-07    156<H>  |  122<H>  |  43<H>  ----------------------------<  86  3.6   |  24  |  1.26    Ca    8.4<L>      07 Dec 2018 08:22  Mg     2.4     12-06    TPro  5.2<L>  /  Alb  2.0<L>  /  TBili  0.7  /  DBili  x   /  AST  136<H>  /  ALT  34  /  AlkPhos  423<H>  12-06           Cultures:       Culture - Urine (collected 12-05-18 @ 13:36)  Source: .Urine None  Final Report (12-06-18 @ 22:28):    <10,000 CFU/ml Normal Urogenital camron present        < from: CT Angio Abdomen and Pelvis w/ IV Cont (12.05.18 @ 14:05) >    EXAM:  CT ANGIO ABD PELVIS (W)AW IC                          EXAM:  CTA CHEST DISSECTION (W)AW IC                            PROCEDURE DATE:  12/05/2018          INTERPRETATION:  CLINICAL INFORMATION: 78-year-old man with hematemesis.    COMPARISON: CT scans 11/24/2018, 11/14/2018, and 4/18/2016    PROCEDURE:   CTA of the Chest, Abdomen and Pelvis was performed with intravenous   contrast.   Intravenous contrast: 90 ml Omnipaque 350. 10 ml discarded.  Oral contrast: None.  Sagittal and coronal reformats were performed. 3-D (MIP) reconstructions   were performed.    FINDINGS:    CHEST:     LUNGS AND LARGE AIRWAYS: Patent central airways. No pulmonary nodules.   Passive atelectasis is present in the left lateral upper and lower lobes   adjacent to old rib fracture.  PLEURA: No pleural effusion or pneumothorax.  VESSELS: No evidence of aortic dissection or intramural hematoma. The   ascending aorta is mildly dilated, measuring 4.8 cm.  HEART: Heart size is normal. No pericardial effusion.  MEDIASTINUM AND STEWART: Right hilar adenopathy is present, the largest   lymph node in the right hilum measuring 2.3 x 2.0 cm is unchanged   compared to prior exam. There is fluid distention of the esophagus.  CHEST WALL AND LOWER NECK: Within normal limits.    ABDOMEN AND PELVIS:    LIVER: A right hepatic lobe cyst measuring 1.7 cm is without significant   change since 4/18/2016. There is heterogeneous enhancement of the liver.   Previously noted lesion in the right hepatic lobe     Multiple vagueliver lesions are now visible as compared to prior   examination 11/14/2018 the most prominent measuring 1.2 cm in the right   hepatic lobe (4:58) and 1.4 cm in the left hepatic lobe (4:74).   Peripheral enhancement is seen in association with the lesion in the   right hepatic lobe. There is heterogeneous enhancement adjacent to the   gallbladder fossa. Findings may reflect metastatic disease. In the   appropriate clinical context, liver abscess may also be considered. The   exophytic component of the previously noted right hepatic lobe lesion is   reidentified. Margins of this lesion are difficult to assess on the   current exam.    BILE DUCTS: Pneumobilia is present.  GALLBLADDER: Surgically removed.  SPLEEN: Within normal limits.  PANCREAS: Within normal limits.  ADRENALS: There is nonspecific thickening of the left adrenal gland. A   right adrenal calcification measures 7 mm.  KIDNEYS/URETERS: Bilateral percutaneous nephrostomy tubes are present.   There is no hydronephrosis.    BLADDER: Diffuse thickening of the bladder wall.  REPRODUCTIVE ORGANS: Prostate is enlarged, with a prominent central lobe.   Findings are partially obscured due to metallic streak artifact from the   left hip arthroplasty.    BOWEL: No bowel obstruction. Appendix is not visualized. Confluent   enhancing mesenteric soft tissue consistent with peritoneal disease is   reidentified (4:103)  PERITONEUM: Trace abdominal and pelvic free fluid.  VESSELS:  Atheromatous changes are present. Focal stable dissection of   the infrarenal abdominal aorta (4:90). At the level of the distal   stomach, a rounded region of enhancement is present measuring 9 mm in   diameter (4:80, 605:50, and 604:124) along the posterior gastric antrum.   This appears to be extrinsic to the stomach, and is favored to be a   weakly enhancing lymph node.     RETROPERITONEUM: Confluent adenopathy surrounding the aorta, SMA, and   iliac vessels measuring up to 7.5 x 4.2 cm (4:90) at the level of the   infrarenal aorta is without significant change.    ABDOMINAL WALL: Within normal limits.  BONES: Left hip arthroplasty. Degenerative changes of the spine, which   indicates mild curvature. Compression deformity of the T12 vertebral body   is reidentified.    IMPRESSION:   *  Regionof enhancement measuring 9 mm in diameter and on arterial phase   images along the posterior aspect of the gastric antrum. This lesion   appears extrinsic to the stomach, and is favored to be a weakly enhancing   lymph node rather than evidence of active GI bleed. No definitive   evidence of active GI bleed on arterial phase images.  *  Extensive lymphadenopathy in the chest, abdomen, and pelvis with   findings of peritoneal carcinomatosis.  *  Heterogeneous enhancement of the liver with multipleliver lesions   which are more apparent on the current exam, concerning for metastatic   disease. In the appropriate clinical context, liver abscess may also be   considered.      < end of copied text >      Radiology: all available radiological tests reviewed    Advanced directives addressed: full resuscitation

## 2018-12-07 NOTE — PROGRESS NOTE ADULT - ASSESSMENT
GIbleed  Events noted-Pt hypotensive.No active bleeding  O2 sats low as well. Pt unstable for sedation for EGD

## 2018-12-07 NOTE — DIETITIAN INITIAL EVALUATION ADULT. - PERTINENT LABORATORY DATA
12-07 Na156 mmol/L<H> Glu 86 mg/dL K+ 3.6 mmol/L Cr  1.26 mg/dL BUN 43 mg/dL<H> Phos n/a   Alb n/a   PAB n/a

## 2018-12-07 NOTE — PROGRESS NOTE ADULT - SUBJECTIVE AND OBJECTIVE BOX
SUBJECTIVE: HPI (12/5/2018) 79 y/o male with CAD, hyperlipidemia, GERD, metastatic prostate CA with peritoneal carcinomatosis, neurogenic bladder s/p b/l nephrostomies, AAA, presents to the ED for further evaluation of episodes of 'coffee ground emesis.' As per EMS, NH, and ED documentation (patient unable to provide significant HPI due to current medical condition) the patient was notably hypotensive at [place] (60/42) and upon arrival to  (BP 84/68 in triage). Labs => Hgb/Hct 9.7/32.5 -> 8.4/27.9, , INR 1.74, Na 147, BUN/Cr 52/1.76, Alb 2.5, , , UA (+). In the ED the patient was given NS x 3L, started on a PPI gtt per protocol, and given Piperacillin/tazobactam 3.375g IVPB x 1.     12/7/2018: Patient seen and examined at bedside with medical team. Overnight the patient, had multiple episodes for yelling out in pain from his abdomen, then falling asleep. Patient looked visibly uncomfortable during exam. Patient did not answer questions upon interview, other than saying he was in pain, then proceeded to fall asleep. Later in the day, patient noted to have hypotension, and was bolused with D51/2NS as he is NPO for Upper endoscopy this AM.     REVIEW OF SYSTEMS: + abdominal pain, unable to assess other aspect of ROS    Vital Signs Last 24 Hrs  T(C): 36.5 (07 Dec 2018 11:09), Max: 36.6 (07 Dec 2018 04:05)  T(F): 97.7 (07 Dec 2018 11:09), Max: 97.8 (07 Dec 2018 04:05)  HR: 90 (07 Dec 2018 13:18) (88 - 100)  BP: 70/40 (07 Dec 2018 13:18) (70/40 - 116/71)  BP(mean): --  RR: 17 (07 Dec 2018 11:09) (17 - 18)  SpO2: 97% (07 Dec 2018 11:09) (94% - 97%)    I&O's Summary    06 Dec 2018 07:01  -  07 Dec 2018 07:00  --------------------------------------------------------  IN: 1383 mL / OUT: 500 mL / NET: 883 mL      PHYSICAL EXAM:  Constitutional: NAD, awake, thin, elderly man; lying on left side in hospital bed  HEENT: PERR, EOMI, dry mucous membranes  Respiratory: decreased inspiratory effort; no rhonci or wheezes appreciated  Cardiovascular: S1 and S2, regular rate and rhythm, no Murmurs, gallops or rubs  Gastrointestinal: Bowel Sounds present, soft, nondistended, no guarding  Extremities: thin extremities; excoriations noted on b/l lower extremities  Neurological: A/O to person; unable to assess focal deficits  Musculoskeletal: unable to assess as patient not following commands; moving all four limbs independently  Skin: No rashes    MEDICATIONS:  MEDICATIONS  (STANDING):  dextrose 5% + sodium chloride 0.45%. 1000 milliLiter(s) (1000 mL/Hr) IV Continuous <Continuous>  fentaNYL   Patch  12 MICROgram(s)/Hr 1 Patch Transdermal every 72 hours  lactobacillus acidophilus 1 Tablet(s) Oral daily  pantoprazole  Injectable 40 milliGRAM(s) IV Push two times a day      LABS: All Labs Reviewed:                        8.8    6.04  )-----------( 96       ( 07 Dec 2018 08:22 )             29.1     12-07    156<H>  |  122<H>  |  43<H>  ----------------------------<  86  3.6   |  24  |  1.26    Ca    8.4<L>      07 Dec 2018 08:22  Mg     2.4     12-06    TPro  5.2<L>  /  Alb  2.0<L>  /  TBili  0.7  /  DBili  x   /  AST  136<H>  /  ALT  34  /  AlkPhos  423<H>  12-06          Blood Culture: 12-05 @ 13:36  Organism --  Gram Stain Blood -- Gram Stain --  Specimen Source .Urine None  Culture-Blood --        RADIOLOGY/EKG:    DVT PPX:    ADVANCED DIRECTIVE:    DISPOSITION: SUBJECTIVE: HPI (12/5/2018) 79 y/o male with CAD, hyperlipidemia, GERD, metastatic prostate CA with peritoneal carcinomatosis, neurogenic bladder s/p b/l nephrostomies, AAA, presents to the ED for further evaluation of episodes of 'coffee ground emesis.' As per EMS, NH, and ED documentation (patient unable to provide significant HPI due to current medical condition) the patient was notably hypotensive at [place] (60/42) and upon arrival to  (BP 84/68 in triage). Labs => Hgb/Hct 9.7/32.5 -> 8.4/27.9, , INR 1.74, Na 147, BUN/Cr 52/1.76, Alb 2.5, , , UA (+). In the ED the patient was given NS x 3L, started on a PPI gtt per protocol, and given Piperacillin/tazobactam 3.375g IVPB x 1.     12/7/2018: Patient seen and examined at bedside with medical team. Overnight the patient, had multiple episodes for yelling out in pain from his abdomen, then falling asleep. Patient looked visibly uncomfortable during exam. Patient did not answer questions upon interview, other than saying he was in pain, then proceeded to fall asleep. Later in the day, patient noted to have hypotension, and was bolused with D51/2NS as he is NPO for Upper endoscopy this AM. EGD canceled today due to continued hypotension- ICU consulted.     REVIEW OF SYSTEMS: + abdominal pain, unable to assess other aspect of ROS    Vital Signs Last 24 Hrs  T(C): 36.5 (07 Dec 2018 11:09), Max: 36.6 (07 Dec 2018 04:05)  T(F): 97.7 (07 Dec 2018 11:09), Max: 97.8 (07 Dec 2018 04:05)  HR: 90 (07 Dec 2018 13:18) (88 - 100)  BP: 70/40 (07 Dec 2018 13:18) (70/40 - 116/71)  BP(mean): --  RR: 17 (07 Dec 2018 11:09) (17 - 18)  SpO2: 97% (07 Dec 2018 11:09) (94% - 97%)    I&O's Summary    06 Dec 2018 07:01  -  07 Dec 2018 07:00  --------------------------------------------------------  IN: 1383 mL / OUT: 500 mL / NET: 883 mL      PHYSICAL EXAM:  Constitutional: NAD, awake, thin, elderly man; lying on left side in hospital bed  HEENT: PERR, EOMI, dry mucous membranes  Respiratory: decreased inspiratory effort; no rhonci or wheezes appreciated  Cardiovascular: S1 and S2, regular rate and rhythm, no Murmurs, gallops or rubs  Gastrointestinal: Bowel Sounds present, soft, nondistended, no guarding  Extremities: thin extremities; excoriations noted on b/l lower extremities  Neurological: A/O to person; unable to assess focal deficits  Musculoskeletal: unable to assess as patient not following commands; moving all four limbs independently  Skin: No rashes    MEDICATIONS:  MEDICATIONS  (STANDING):  dextrose 5% + sodium chloride 0.45%. 1000 milliLiter(s) (1000 mL/Hr) IV Continuous <Continuous>  fentaNYL   Patch  12 MICROgram(s)/Hr 1 Patch Transdermal every 72 hours  lactobacillus acidophilus 1 Tablet(s) Oral daily  pantoprazole  Injectable 40 milliGRAM(s) IV Push two times a day      LABS: All Labs Reviewed:                        8.8    6.04  )-----------( 96       ( 07 Dec 2018 08:22 )             29.1     12-07    156<H>  |  122<H>  |  43<H>  ----------------------------<  86  3.6   |  24  |  1.26    Ca    8.4<L>      07 Dec 2018 08:22  Mg     2.4     12-06    TPro  5.2<L>  /  Alb  2.0<L>  /  TBili  0.7  /  DBili  x   /  AST  136<H>  /  ALT  34  /  AlkPhos  423<H>  12-06                          7.9    5.04  )-----------( 79       ( 07 Dec 2018 15:50 )             25.9   12-07    154<H>  |  121<H>  |  40<H>  ----------------------------<  229<H>  2.8<LL>   |  24  |  1.40<H>    Ca    7.6<L>      07 Dec 2018 15:50      TPro  5.2<L>  /  Alb  2.0<L>  /  TBili  0.7  /  DBili  x   /  AST  136<H>  /  ALT  34  /  AlkPhos  423<H>  12-06        Blood Culture: 12-05 @ 13:36  Organism --  Gram Stain Blood -- Gram Stain --  Specimen Source .Urine None  Culture-Blood --        RADIOLOGY/EKG:    DVT PPX:    ADVANCED DIRECTIVE:    DISPOSITION:

## 2018-12-07 NOTE — CHART NOTE - NSCHARTNOTEFT_GEN_A_CORE
Upon Nutritional Assessment by the Registered Dietitian your patient was determined to meet criteria / has evidence of the following diagnosis/diagnoses:          [ ]  Mild Protein Calorie Malnutrition        [ ]  Moderate Protein Calorie Malnutrition        [x ] Severe Protein Calorie Malnutrition        [ ] Unspecified Protein Calorie Malnutrition        [ ] Underweight / BMI <19        [ ] Morbid Obesity / BMI > 40      Findings:    NFPE significant for severe muscle wasting in temporal, clavicle, deltoid, scapular, thigh, patellar, and calf regions and severe fat depletion in occipital region, triceps, and ribs.   Pt is noted with clinically significant wt loss of 30# over past 5 months (19% BW).    Findings as based on:  •  Comprehensive nutrition assessment and consultation  •  Calorie counts (nutrient intake analysis)  •  Food acceptance and intake status from observations by staff  •  Follow up  •  Patient education  •  Intervention secondary to interdisciplinary rounds  •   concerns      Treatment:      1. Advance diet as medically feasible to clear liquid--->regular diet.   2. Provide ensure enlive TID   3. Monitor wt daily   4. Provide maximum assistance and encouragement with meals and supplements.   5. Provide MVI with minerals to meet 100% RDI    The following diet has been recommended:      PROVIDER Section:     By signing this assessment you are acknowledging and agree with the diagnosis/diagnoses assigned by the Registered Dietitian    Comments:

## 2018-12-07 NOTE — PROGRESS NOTE ADULT - ASSESSMENT
79 y/o male with CAD, hyperlipidemia, GERD, metastatic prostate CA with peritoneal carcinomatosis, neurogenic bladder s/p b/l nephrostomies, AAA, admitted from snf on 12/5 for evaluation of vomiting coffee ground emesis; patient was hypotensive at snf. History per medical record as patient unable to provide history.   1. Patient admitted with vomiting possibly upper GI bleed; found to have pyuria, to rule out UTI, especially given history of resistant bacteria in urine in past  - follow up cultures   - GI evaluation in progress  - iv hydration and supportive care   - serial cbc and monitor temperature   - reviewed prior medical records to evaluate for resistant or atypical pathogens   - negative urine culture, will stop meropenem and isolation  - consideration for palliative care evaluation  2. other issues: CAD, hyperlipidemia, GERD, metastatic prostate CA with peritoneal carcinomatosis, neurogenic bladder s/p b/l nephrostomies, AAA,  - per medicine  If further ID issues please reconsult

## 2018-12-07 NOTE — DIETITIAN INITIAL EVALUATION ADULT. - OTHER INFO
77 yo M seen as consult for BMI <18. Pt admitted from [facility] s/p episode of coffee ground emesis and notably hypotensive. PMH CAD, HLD, GERD, metastatic prostate CA with peritoneal carcinomatosis, neurogenic bladder s/p b/l nephrostomies, AAA. Pt is DNR/DNI. Pt is previously noted with severe malnutrition. On observation pt appears very thin and NFPE significant for severe muscle wasting in temporal, clavicle, deltoid, scapular, thigh, patellar, and calf regions and severe fat depletion in occipital region, triceps, and ribs. Pt unable to report appetite/intake or provide dietary recall at this time. Pt is noted with clinically significant wt loss of 30# over past 5 months (19% BW). Based on above pt meets criteria for severe malnutrition in the context of chronic illness. PT NPO for EGD today. No more noted episodes of emesis. Last BM 12/7. Noted trace edema in b/l feet. Jian 11. Recommendations: 1. Advance diet as medically feasible to clear liquid--->regular diet. 2. Provide ensure enlive TID 3. Monitor wt daily 4. Provide maxium assistance and encouragement with meals and supplements. 5. Provide MVI with minerals to meet 100% RDI

## 2018-12-07 NOTE — PROVIDER CONTACT NOTE (CRITICAL VALUE NOTIFICATION) - ASSESSMENT
I called Gretel REYNOLDS and asked why pt was sent hypotensive, she claimed Dr. Metzger was aware of patient going for procedure and approved.  I then called Dr. Metzger to bedside in Endo unit,  She spoke with anesthesiologist and endoscopist Dr. Mckeon.

## 2018-12-07 NOTE — PROGRESS NOTE ADULT - SUBJECTIVE AND OBJECTIVE BOX
Interval History:    MEDICATIONS  (STANDING):  dextrose 5% + sodium chloride 0.45%. 1000 milliLiter(s) (125 mL/Hr) IV Continuous <Continuous>  fentaNYL   Patch  12 MICROgram(s)/Hr 1 Patch Transdermal every 72 hours  lactobacillus acidophilus 1 Tablet(s) Oral daily  pantoprazole  Injectable 40 milliGRAM(s) IV Push two times a day    MEDICATIONS  (PRN):  Biotene Dry Mouth Oral Rinse 5 milliLiter(s) Swish and Spit four times a day PRN Mouth Care  HYDROmorphone  Injectable 0.5 milliGRAM(s) IV Push four times a day PRN Moderate Pain (4 - 6)  ondansetron Injectable 4 milliGRAM(s) IV Push every 6 hours PRN Nausea      Daily     Daily Weight in k.6 (07 Dec 2018 11:17)  BMI: 20.5 ( @ 10:36)  Change in Weight:  Vital Signs Last 24 Hrs  T(C): 36.5 (07 Dec 2018 11:09), Max: 36.6 (07 Dec 2018 04:05)  T(F): 97.7 (07 Dec 2018 11:09), Max: 97.8 (07 Dec 2018 04:05)  HR: 86 (07 Dec 2018 14:24) (86 - 100)  BP: 85/50 (07 Dec 2018 14:47) (70/40 - 116/71)  BP(mean): --  RR: 17 (07 Dec 2018 11:09) (17 - 18)  SpO2: 97% (07 Dec 2018 11:09) (94% - 97%)  I&O's Detail    06 Dec 2018 07:01  -  07 Dec 2018 07:00  --------------------------------------------------------  IN:    IV PiggyBack: 100 mL    Packed Red Blood Cells: 333 mL    sodium chloride 0.9%: 950 mL  Total IN: 1383 mL    OUT:    Nephrostomy Tube: 150 mL    Nephrostomy Tube: 350 mL  Total OUT: 500 mL    Total NET: 883 mL          PHYSICAL EXAM  General:  Well developed, well nourished, alert and active, no pallor, NAD.  HEENT:    Normal appearance of conjunctiva, ears, nose, lips, oropharynx, and oral mucosa, anicteric.  Neck:  No masses, no asymmetry.  Lymph Nodes:  No lymphadenopathy.   Cardiovascular:  RRR normal S1/S2, no murmur.  Respiratory:  CTA B/L, normal respiratory effort.   Abdominal:   soft, no masses or tenderness, normoactive BS, NT/ND, no HSM.  Extremities:   No clubbing or cyanosis, normal capillary refill, no edema.   Skin:   No rash, jaundice, lesions, eczema.   Musculoskeletal:  No joint swelling, erythema or tenderness.   Other:     Lab Results:                        8.8    6.04  )-----------( 96       ( 07 Dec 2018 08:22 )             29.1     12-    156<H>  |  122<H>  |  43<H>  ----------------------------<  86  3.6   |  24  |  1.26    Ca    8.4<L>      07 Dec 2018 08:22  Mg     2.4         TPro  5.2<L>  /  Alb  2.0<L>  /  TBili  0.7  /  DBili  x   /  AST  136<H>  /  ALT  34  /  AlkPhos  423<H>  12-    LIVER FUNCTIONS - ( 06 Dec 2018 04:28 )  Alb: 2.0 g/dL / Pro: 5.2 gm/dL / ALK PHOS: 423 U/L / ALT: 34 U/L / AST: 136 U/L / GGT: x                 Stool Results:          RADIOLOGY RESULTS:    SURGICAL PATHOLOGY:

## 2018-12-07 NOTE — CHART NOTE - NSCHARTNOTEFT_GEN_A_CORE
Went to see the pt after being transfused to check for fluid overload. Daughter was at bedside and stated that she no longer wants the pressors and wants comfort care for the pt at this time and would like to speak with the palliative team. I placed a consult for the palliative team.  Pt did not have any crackles and sleeping.    Vital Signs Last 24 Hrs  T(C): 36.3 (07 Dec 2018 18:35), Max: 36.6 (07 Dec 2018 04:05)  T(F): 97.4 (07 Dec 2018 18:35), Max: 97.8 (07 Dec 2018 04:05)  HR: 84 (07 Dec 2018 22:15) (80 - 100)  BP: 102/57 (07 Dec 2018 22:15) (70/40 - 116/71)  BP(mean): --  RR: 15 (07 Dec 2018 22:15) (15 - 18)  SpO2: 100% (07 Dec 2018 22:15) (95% - 100%)

## 2018-12-07 NOTE — CHART NOTE - NSCHARTNOTEFT_GEN_A_CORE
Nurse called because pt was complaining of chest pain. Went to see the pt and the pt was sleeping. Woke the pt up and asked where th pain was he said his right and left nipples. Ordered an EKG because the chest pain complaint is no and 1x troponin. Pt went back to sleep after asking him questions. Troponin came back 0.07. Do not think it is cardiac related as it is not significantly elevated. Got a repeat troponin and will follow up the results    Vital Signs Last 24 Hrs  T(C): 36.3 (07 Dec 2018 18:35), Max: 36.6 (07 Dec 2018 04:05)  T(F): 97.4 (07 Dec 2018 18:35), Max: 97.8 (07 Dec 2018 04:05)  HR: 84 (07 Dec 2018 22:15) (80 - 100)  BP: 102/57 (07 Dec 2018 22:15) (70/40 - 116/71)  RR: 15 (07 Dec 2018 22:15) (15 - 18)  SpO2: 100% (07 Dec 2018 22:15) (95% - 100%)

## 2018-12-07 NOTE — DIETITIAN INITIAL EVALUATION ADULT. - PHYSICAL APPEARANCE
emaciated/NFPE significant for severe muscle wasting in temporal, clavicle, deltoid, scapular, thigh, patellar, and calf regions and severe fat depletion in occipital region, triceps, and ribs.

## 2018-12-07 NOTE — PROVIDER CONTACT NOTE (CRITICAL VALUE NOTIFICATION) - SITUATION
Received patient at 1500  from [facility] to endoscopy unit for scheduled upper endoscopy.  Pt B/P 79/47 HR 86, RR 14 O2 Sat. 90 with IVF Bolus infusing.  Pt lethargic and responsive to verbal stimuli as reported baseline from KYLE Majano RN.    @ 1505 B/P 73/44, Pt placed in Trendelenburg and O2 applied.  Dr. Hoyos made aware and at bedside at 1510. B/P monitored on unit for 20 minutes while Doctors evaluated pt and fluctuated SBP 60- 74 DBP 30-47...see addend in assessment note
Patient with elevated troponins 0.070

## 2018-12-07 NOTE — PROGRESS NOTE ADULT - ASSESSMENT
77 y/o male with CAD, hyperlipidemia, GERD, metastatic prostate CA with peritoneal carcinomatosis, neurogenic bladder s/p b/l nephrostomies, AAA, presents to the ED for further evaluation of episodes of 'coffee ground emesis.' Patient found to be hypotensive 2/2 to upper GI bleed.     #Upper GI Bleed  - no episodes of hematemesis or melena since admission  -  Hg 9.5 -> 8.9 ->  7.9  this afternoon  - s/p 1 unit PRBC 12/6  - Protonix 40mg IV push BID (unable to have protonix drip 2/2 lack of IV access)  - H&H q6 hours; transfuse PRN Hg <8 in setting of CAD  - Full liquid diet  - GI recommendations appreciated: EGD canceled today due to hypotension  - spoke with daughter Berta Ghotra- agrees with plan for EGD and transfusion as needed    #hypotension  - reoccurrence this afternoon  - s/p 3L NS in ED  -d/c NS;  start D5(1/2)NS @125; s/p 2L D51/2NS today  - Vital signs q8  - has h/o hypertension: will hold home metoprolol 25mg TID in the setting of hypotension.     # UTI  - UA + in ED  - no growth in urine culture  - s/p Meropenem 500mg IV  and Zosyn (2 days total)  - d/c Meropenum today  - d/c isolation  - ID recommendations appreciated    #Pre- Renal VIJAY  - improving  - BUN/CR >20  - Cr baseline 0.92  - D5(1/2)NS @125cc/hr  - recheck BMP in AM    #Severe protein deficiency  - likely 2/2 malignancy  - will start enlive with diet    #Advanced Directives  - DNR/DNI  - Family now ok with central line if needed for IVF 79 y/o male with CAD, hyperlipidemia, GERD, metastatic prostate CA with peritoneal carcinomatosis, neurogenic bladder s/p b/l nephrostomies, AAA, presents to the ED for further evaluation of episodes of 'coffee ground emesis.' Patient found to be hypotensive 2/2 to upper GI bleed.     #Upper GI Bleed  - no episodes of hematemesis or melena since admission  -  Hg 9.5 -> 8.9 ->  7.9  this afternoon  - s/p 1 unit PRBC 12/6  - will recieve 1 unit PRBC today  - Protonix 40mg IV push BID (unable to have protonix drip 2/2 lack of IV access)  - H&H q6 hours; transfuse PRN Hg <8 in setting of CAD  - Full liquid diet  - GI recommendations appreciated: EGD canceled today due to hypotension  - spoke with daughter Berta Ghotra- agrees with plan for EGD and transfusion as needed    #hypotension  - reoccurrence this afternoon  - s/p 3L NS in ED  - d/c NS;  start D5(1/2)NS @125; s/p 2L D5(1/2)NS today  - Vital signs q8  - has h/o hypertension: will hold home metoprolol 25mg TID in the setting of hypotension.     #Hypokalemia  - 3 Kcl riders ordered  - will recheck K+ in AM    #Hypernatremia with hyperchloremia  - likely 2/2 NS IV fluids  - switched to D5(1/2)NS      # UTI  - UA + in ED  - no growth in urine culture  - s/p Meropenem 500mg IV  and Zosyn (2 days total)  - d/c Meropenum today  - d/c isolation  - ID recommendations appreciated    #Pre- Renal VIJAY  - improving  - BUN/CR >20  - Cr baseline 0.92  - D5(1/2)NS @125cc/hr  - recheck BMP in AM    #Severe protein deficiency  - likely 2/2 malignancy  - will start enlive with diet    #Advanced Directives  - DNR/DNI  - Family now ok with central line if needed for IVF 79 y/o male with CAD, hyperlipidemia, GERD, metastatic prostate CA with peritoneal carcinomatosis, neurogenic bladder s/p b/l nephrostomies, AAA, presents to the ED for further evaluation of episodes of 'coffee ground emesis.' Patient found to be hypotensive 2/2 to upper GI bleed.     #Upper GI Bleed  - no episodes of hematemesis or melena since admission  -  Hg 9.5 -> 8.9 ->  7.9  this afternoon  - s/p 1 unit PRBC 12/6  - will recieve 1 unit PRBC today  - Protonix 40mg IV push BID (unable to have protonix drip 2/2 lack of IV access)  - H&H q6 hours; transfuse PRN Hg <8 in setting of CAD  - Full liquid diet  - GI recommendations appreciated: EGD canceled today due to hypotension  - spoke with daughter Berta Ghotra- agrees with plan for EGD and transfusion as needed    #hypotension  - reoccurrence this afternoon  - s/p 3L NS in ED  - d/c NS;  start D5(1/2)NS @125; s/p 2L D5(1/2)NS today  - Vital signs q8  - has h/o hypertension: will hold home metoprolol 25mg TID in the setting of hypotension.     #abdominal pain  - fentanyl patch added  - PRN medication changed to Dilaudid 0.5mg q6 IV push    #Hypokalemia  - 3 Kcl riders ordered  - will recheck K+ in AM    #Hypernatremia with hyperchloremia  - likely 2/2 NS IV fluids  - switched to D5(1/2)NS      # UTI  - UA + in ED  - no growth in urine culture  - s/p Meropenem 500mg IV  and Zosyn (2 days total)  - d/c Meropenum today  - d/c isolation  - ID recommendations appreciated    #Pre- Renal VIJAY  - improving  - BUN/CR >20  - Cr baseline 0.92  - D5(1/2)NS @125cc/hr  - recheck BMP in AM    #Severe protein deficiency  - likely 2/2 malignancy  - will start enlive with diet    #Advanced Directives  - DNR/DNI  - Family now ok with central line if needed for IVF

## 2018-12-07 NOTE — DIETITIAN INITIAL EVALUATION ADULT. - PERTINENT MEDS FT
MEDICATIONS  (STANDING):  fentaNYL   Patch  12 MICROgram(s)/Hr 1 Patch Transdermal every 72 hours  HYDROmorphone  Injectable 0.5 milliGRAM(s) IV Push once  pantoprazole  Injectable 40 milliGRAM(s) IV Push two times a day    MEDICATIONS  (PRN):  Biotene Dry Mouth Oral Rinse 5 milliLiter(s) Swish and Spit four times a day PRN Mouth Care  morphine  - Injectable 2 milliGRAM(s) IV Push every 6 hours PRN Severe Pain (7 - 10)  ondansetron Injectable 4 milliGRAM(s) IV Push every 6 hours PRN Nausea

## 2018-12-07 NOTE — CHART NOTE - NSCHARTNOTEFT_GEN_A_CORE
Patient is 78y M PMHx of Prostatic Ca with Peritoneal Carcinomatosis, neurogenic bladder s/p b/l Nephrostomy tubes, CAD, HLD, GERD, AAA, admitted with GI bleed after episode of coffee ground emesis, patient now with hypotension. Patient has had systolic in 60s at nursing home, on arrival was 84/68 with trends in the 90s-100s systolic. Patient went for EGD, was cancelled due to decreased low blood pressure 70s/40s, ICU was called. Patient seen and examined, at baseline per nurse and primary hospitalist team, patient NAD frail appearing and responds to questions. Patient is without headache/dizziness, pain, unable to assess as patient is poor historian.    Blood pressure taken in the room after 1.5L bolus 0.45% saline with 5% dextrose, 80s systolic. 500mL bolus given wide open, patient running 125cc/hr additionally, and retaken, was 90/55.    ROS AS STATED ABOVE    Hemoglobin: 7.9 g/dL (12.07.18 @ 15:50)  Hematocrit: 25.9 % (12.07.18 @ 15:50)    Hemoglobin: 8.8 g/dL (12.07.18 @ 08:22)  Hematocrit: 29.1 % (12.07.18 @ 08:22)    Phys Ex:  Gen: Frail appearing, awake and alert, NAD  Pulm: CTA B/l  CVS: S1/S2+ no murmurs appreciated  Skin: Hematoma LUE, bruising generalized    a/p: 78y M with hypotensive episode, not deemed hemodynamically stable for anesthesia for EGD, given proper resuscitation  -1u PRBC stat  - f/u CBC after Transfusion    case dw Primary Hospitalist Team, Dr. Mccormick Patient is 78y M PMHx of Prostatic Ca with Peritoneal Carcinomatosis, neurogenic bladder s/p b/l Nephrostomy tubes, CAD, HLD, GERD, AAA, admitted with GI bleed after episode of coffee ground emesis, patient now with hypotension. Patient has had systolic in 60s at nursing home, on arrival was 84/68 with trends in the 90s-100s systolic. Patient went for EGD, was cancelled due to decreased low blood pressure 70s/40s, ICU was called. Patient seen and examined, at baseline per nurse and primary hospitalist team, patient NAD frail appearing and responds to questions. Patient is without headache/dizziness, pain, unable to assess as patient is poor historian.    Blood pressure taken in the room after 1.5L bolus 0.45% saline with 5% dextrose, 80s systolic. 500mL bolus given wide open, patient running 125cc/hr additionally, and retaken, was 90/55.    ROS AS STATED ABOVE    Hemoglobin: 7.9 g/dL (12.07.18 @ 15:50)  Hematocrit: 25.9 % (12.07.18 @ 15:50)    Hemoglobin: 8.8 g/dL (12.07.18 @ 08:22)  Hematocrit: 29.1 % (12.07.18 @ 08:22)    Phys Ex:  Gen: Frail appearing, awake and alert, NAD  Pulm: CTA B/l  CVS: S1/S2+ no murmurs appreciated  Skin: Hematoma LUE, bruising generalized    a/p: 78y M with hypotensive episode, not deemed hemodynamically stable for anesthesia for EGD, given proper resuscitation  -1u PRBC stat  - f/u CBC after Transfusion    case dw Primary Hospitalist Team, Dr. Mccormick        Attending Addendum:    pt seen on 5E with Dr Teran ([facility] resident) on 12/7 at 16:30.    agree with above with exception to/addition of:\    79yo M with hypotension, GI bleeding, and anemia.  AA and interactive.  Offers no somatic complaints.    A) hypotension due to anemia and severe intravascular volume depletion as suggested by hypernatremia.      anemia due to acute hemorrhage/blood loss       severe protein calorie malnutrition and cachexia due to underlying malignancy       dismal prognosis for survival/meaningful recovery       DNR and DNI in place    plan at this time for 2u PRBC and should also include:  hydration  goals of care conversation with patient and family.  no acute interventions appropriate at this time.  supportive care    Critical care time 45 min excluding teaching, routine updates, procedures.

## 2018-12-07 NOTE — DIETITIAN INITIAL EVALUATION ADULT. - PROBLEM SELECTOR PLAN 5
~as noted in prior documentation and per patient's daughter Berta Ghotra [phone] the patient is DNR/DNI (MOLST form in chart). The patient does not want any invasive procedures including central line insertion at this time.  Total time' 16 minutes

## 2018-12-07 NOTE — PROVIDER CONTACT NOTE (CRITICAL VALUE NOTIFICATION) - RECOMMENDATIONS
Returned patient to [facility] for further resuscitation, as per Dr. Metzger and Dr. Hoyos transported by 2 RNs

## 2018-12-08 ENCOUNTER — TRANSCRIPTION ENCOUNTER (OUTPATIENT)
Age: 78
End: 2018-12-08

## 2018-12-08 VITALS
OXYGEN SATURATION: 98 % | DIASTOLIC BLOOD PRESSURE: 60 MMHG | HEART RATE: 82 BPM | RESPIRATION RATE: 16 BRPM | TEMPERATURE: 97 F | SYSTOLIC BLOOD PRESSURE: 105 MMHG

## 2018-12-08 LAB
ANION GAP SERPL CALC-SCNC: 6 MMOL/L — SIGNIFICANT CHANGE UP (ref 5–17)
BUN SERPL-MCNC: 31 MG/DL — HIGH (ref 7–23)
CALCIUM SERPL-MCNC: 8.2 MG/DL — LOW (ref 8.5–10.1)
CHLORIDE SERPL-SCNC: 121 MMOL/L — HIGH (ref 96–108)
CO2 SERPL-SCNC: 27 MMOL/L — SIGNIFICANT CHANGE UP (ref 22–31)
CREAT SERPL-MCNC: 1.04 MG/DL — SIGNIFICANT CHANGE UP (ref 0.5–1.3)
GLUCOSE SERPL-MCNC: 92 MG/DL — SIGNIFICANT CHANGE UP (ref 70–99)
HCT VFR BLD CALC: 29.5 % — LOW (ref 39–50)
HCT VFR BLD CALC: 30.4 % — LOW (ref 39–50)
HGB BLD-MCNC: 9 G/DL — LOW (ref 13–17)
HGB BLD-MCNC: 9.4 G/DL — LOW (ref 13–17)
MAGNESIUM SERPL-MCNC: 2.1 MG/DL — SIGNIFICANT CHANGE UP (ref 1.6–2.6)
MCHC RBC-ENTMCNC: 27.1 PG — SIGNIFICANT CHANGE UP (ref 27–34)
MCHC RBC-ENTMCNC: 27.4 PG — SIGNIFICANT CHANGE UP (ref 27–34)
MCHC RBC-ENTMCNC: 30.5 GM/DL — LOW (ref 32–36)
MCHC RBC-ENTMCNC: 30.9 GM/DL — LOW (ref 32–36)
MCV RBC AUTO: 87.6 FL — SIGNIFICANT CHANGE UP (ref 80–100)
MCV RBC AUTO: 89.7 FL — SIGNIFICANT CHANGE UP (ref 80–100)
NRBC # BLD: 0 /100 WBCS — SIGNIFICANT CHANGE UP (ref 0–0)
NRBC # BLD: 0 /100 WBCS — SIGNIFICANT CHANGE UP (ref 0–0)
PHOSPHATE SERPL-MCNC: 3 MG/DL — SIGNIFICANT CHANGE UP (ref 2.5–4.5)
PLATELET # BLD AUTO: 71 K/UL — LOW (ref 150–400)
PLATELET # BLD AUTO: 76 K/UL — LOW (ref 150–400)
POTASSIUM SERPL-MCNC: 3.3 MMOL/L — LOW (ref 3.5–5.3)
POTASSIUM SERPL-SCNC: 3.3 MMOL/L — LOW (ref 3.5–5.3)
RBC # BLD: 3.29 M/UL — LOW (ref 4.2–5.8)
RBC # BLD: 3.47 M/UL — LOW (ref 4.2–5.8)
RBC # FLD: 18.1 % — HIGH (ref 10.3–14.5)
RBC # FLD: 18.3 % — HIGH (ref 10.3–14.5)
SODIUM SERPL-SCNC: 154 MMOL/L — HIGH (ref 135–145)
TROPONIN I SERPL-MCNC: 0.06 NG/ML — HIGH (ref 0.01–0.04)
WBC # BLD: 5.51 K/UL — SIGNIFICANT CHANGE UP (ref 3.8–10.5)
WBC # BLD: 5.55 K/UL — SIGNIFICANT CHANGE UP (ref 3.8–10.5)
WBC # FLD AUTO: 5.51 K/UL — SIGNIFICANT CHANGE UP (ref 3.8–10.5)
WBC # FLD AUTO: 5.55 K/UL — SIGNIFICANT CHANGE UP (ref 3.8–10.5)

## 2018-12-08 RX ORDER — BICALUTAMIDE 50 MG/1
1 TABLET, FILM COATED ORAL
Qty: 0 | Refills: 0 | COMMUNITY

## 2018-12-08 RX ORDER — DEXTROSE MONOHYDRATE, SODIUM CHLORIDE, AND POTASSIUM CHLORIDE 50; .745; 4.5 G/1000ML; G/1000ML; G/1000ML
1000 INJECTION, SOLUTION INTRAVENOUS
Qty: 0 | Refills: 0 | Status: DISCONTINUED | OUTPATIENT
Start: 2018-12-08 | End: 2018-12-08

## 2018-12-08 RX ORDER — HYDROMORPHONE HYDROCHLORIDE 2 MG/ML
0.5 INJECTION INTRAMUSCULAR; INTRAVENOUS; SUBCUTANEOUS
Qty: 0 | Refills: 0 | Status: DISCONTINUED | OUTPATIENT
Start: 2018-12-08 | End: 2018-12-10

## 2018-12-08 RX ADMIN — HYDROMORPHONE HYDROCHLORIDE 0.5 MILLIGRAM(S): 2 INJECTION INTRAMUSCULAR; INTRAVENOUS; SUBCUTANEOUS at 23:19

## 2018-12-08 RX ADMIN — HYDROMORPHONE HYDROCHLORIDE 0.5 MILLIGRAM(S): 2 INJECTION INTRAMUSCULAR; INTRAVENOUS; SUBCUTANEOUS at 12:57

## 2018-12-08 RX ADMIN — FENTANYL CITRATE 1 PATCH: 50 INJECTION INTRAVENOUS at 23:20

## 2018-12-08 RX ADMIN — FENTANYL CITRATE 1 PATCH: 50 INJECTION INTRAVENOUS at 07:11

## 2018-12-08 RX ADMIN — PANTOPRAZOLE SODIUM 40 MILLIGRAM(S): 20 TABLET, DELAYED RELEASE ORAL at 17:11

## 2018-12-08 RX ADMIN — Medication 100 MILLIEQUIVALENT(S): at 01:01

## 2018-12-08 RX ADMIN — Medication 0.5 MILLIGRAM(S): at 16:34

## 2018-12-08 RX ADMIN — DEXTROSE MONOHYDRATE, SODIUM CHLORIDE, AND POTASSIUM CHLORIDE 75 MILLILITER(S): 50; .745; 4.5 INJECTION, SOLUTION INTRAVENOUS at 12:15

## 2018-12-08 RX ADMIN — HYDROMORPHONE HYDROCHLORIDE 0.5 MILLIGRAM(S): 2 INJECTION INTRAMUSCULAR; INTRAVENOUS; SUBCUTANEOUS at 10:55

## 2018-12-08 RX ADMIN — HYDROMORPHONE HYDROCHLORIDE 0.5 MILLIGRAM(S): 2 INJECTION INTRAMUSCULAR; INTRAVENOUS; SUBCUTANEOUS at 20:42

## 2018-12-08 RX ADMIN — PANTOPRAZOLE SODIUM 40 MILLIGRAM(S): 20 TABLET, DELAYED RELEASE ORAL at 05:26

## 2018-12-08 NOTE — DISCHARGE NOTE ADULT - CARE PLAN
Principal Discharge DX:	Hematemesis  Goal:	Comfort measures  Secondary Diagnosis:	Prostate CA  Goal:	comfort measures Principal Discharge DX:	Hematemesis  Goal:	Comfort measures  Assessment and plan of treatment:	Hematemesis likely caused by GI bleed. Investigation of source of bleeding halted as you decided goals were comfort care. You will continue care in inpatient hospice.  Secondary Diagnosis:	Prostate CA  Goal:	comfort measures  Assessment and plan of treatment:	Your pain and confusion was likely caused by prostate cancer spread throughout your abdomen. You will be continued on pain and anti-anxiety medicines to make you comfortable while at the inpatient hospice.

## 2018-12-08 NOTE — CHART NOTE - NSCHARTNOTEFT_GEN_A_CORE
HPI:  79 y/o male with CAD, hyperlipidemia, GERD, metastatic prostate CA with peritoneal carcinomatosis, neurogenic bladder s/p b/l nephrostomies, AAA, presents to the ED for further evaluation of episodes of 'coffee ground emesis.' As per EMS, NH, and ED documentation (patient unable to provide significant HPI due to current medical condition) the patient was notably hypotensive at [facility] (60/42) and upon arrival to  (BP 84/68 in triage). (05 Dec 2018 19:46)      PERTINENT PMH REVIEWED:  [ X ] YES [ ] NO           Primary Contact:  Berta                 Relationship:  daughter                HCP/Surrogate: Surrogate          Phone Number: [phone] - Pts daughter Berta reports pt. had completed HCP in the past naming her however, she does not have a copy. Pt. has significant other Brittany who he lived with therefore, she would be surrogate decision maker however, team spoke with her and she has deferred decision making to manjit Hampton     HCP [  ] Surrogate [  X ] Guardian [   ]    Mental Status: [ ] Alert  [  ] Oriented [  ] Confused [ X ] Lethargic [  ]  Concerns of Depression [  ]- none identified   Anxiety [   ]- none identified   Baseline ADLs (prior to admission):  Independent [ ] moderately [ ] fully   Dependent   [ X ] moderately [ ]fully    Family Meeting attendees: Multiple meetings took place first with pts daughter Berta, Dr. Sacks-Berg, Molly Flores Social Work Intern, Ashish Perdue Palliative LMSW. Then team met with pts daughter Berta, friend Arturo and spoke with significant other Brittany via phone and then other manjit Beavers via phone.     Anticipated Grief: Patient [  ] Family [ X ]    Goals of Care: Comfort [ X ] Rehabilitation [  ] Curative [  ] Life Prolonging [  ]    Previous Services: [facility]    ADVANCE DIRECTIVES:  [ X ] YES [ ] NO   DNR [ X ] YES [ ] NO  DNI [ X ] YES [ ] NO                  MOLST  [  X ] YES [ ] NO      Anticipated D/C Plan: inpatient hospice [facility] if bed available                    Summary:    Team met with pts family to discuss goals of care, assist with planning and provide supportive counseling. Pt. was at [facility] for [facility] and prior to that lived at home with his significant other. Pts family discussed how pt. has had a rapid decline. Pts daughter Berta does not wish to pursue more aggressive interventions or testing like Endoscopy as she recognizes pt. is uncomfortable and wants to just focus on pain management and comfort. They discussed how pt. has always made clear he would not want an extraordinary measures.  Team confirmed wishes for DNR/DNI. Pt. also has MOLST with just DNR/DNI.     We discussed option of inpatient hospice and explained these services in detail. They would like referral sent to [facility]. Arcelia AMBROCIO will send referral.     Pts daughter Berta reports pt. had completed HCP in the past naming her however, she does not have a copy. Pt. has significant other Brittany who he lived with therefore, she would be surrogate decision maker however, team spoke with her and she has deferred decision making to daughter Berta. She is in agreement with plan for comfort focus and inpatient hospice however, she wants Berta to make all decisions.     Plan at this time is for referral to [facility]. Emotional support provided. Our team will continue to follow.

## 2018-12-08 NOTE — DISCHARGE NOTE ADULT - OTHER SIGNIFICANT FINDINGS
< from: CT Angio Abdomen and Pelvis w/ IV Cont (12.05.18 @ 14:05) >    CHEST:     LUNGS AND LARGE AIRWAYS: Patent central airways. No pulmonary nodules.   Passive atelectasis is present in the left lateral upper and lower lobes   adjacent to old rib fracture.  PLEURA: No pleural effusion or pneumothorax.  VESSELS: No evidence of aortic dissection or intramural hematoma. The   ascending aorta is mildly dilated, measuring 4.8 cm.  HEART: Heart size is normal. No pericardial effusion.  MEDIASTINUM AND STEWART: Right hilar adenopathy is present, the largest   lymph node in the right hilum measuring 2.3 x 2.0 cm is unchanged   compared to prior exam. There is fluid distention of the esophagus.  CHEST WALL AND LOWER NECK: Within normal limits.    ABDOMEN AND PELVIS:    LIVER: A right hepatic lobe cyst measuring 1.7 cm is without significant   change since 4/18/2016. There is heterogeneous enhancement of the liver.   Previously noted lesion in the right hepatic lobe     Multiple vagueliver lesions are now visible as compared to prior   examination 11/14/2018 the most prominent measuring 1.2 cm in the right   hepatic lobe (4:58) and 1.4 cm in the left hepatic lobe (4:74).   Peripheral enhancement is seen in association with the lesion in the   right hepatic lobe. There is heterogeneous enhancement adjacent to the   gallbladder fossa. Findings may reflect metastatic disease. In the   appropriate clinical context, liver abscess may also be considered. The   exophytic component of the previously noted right hepatic lobe lesion is   reidentified. Margins of this lesion are difficult to assess on the   current exam.    BILE DUCTS: Pneumobilia is present.  GALLBLADDER: Surgically removed.  SPLEEN: Within normal limits.  PANCREAS: Within normal limits.  ADRENALS: There is nonspecific thickening of the left adrenal gland. A   right adrenal calcification measures 7 mm.  KIDNEYS/URETERS: Bilateral percutaneous nephrostomy tubes are present.   There is no hydronephrosis.    BLADDER: Diffuse thickening of the bladder wall.  REPRODUCTIVE ORGANS: Prostate is enlarged, with a prominent central lobe.   Findings are partially obscured due to metallic streak artifact from the   left hip arthroplasty.    BOWEL: No bowel obstruction. Appendix is not visualized. Confluent   enhancing mesenteric soft tissue consistent with peritoneal disease is   reidentified (4:103)  PERITONEUM: Trace abdominal and pelvic free fluid.  VESSELS:  Atheromatous changes are present. Focal stable dissection of   the infrarenal abdominal aorta (4:90). At the level of the distal   stomach, a rounded region of enhancement is present measuring 9 mm in   diameter (4:80, 605:50, and 604:124) along the posterior gastric antrum.   This appears to be extrinsic to the stomach, and is favored to be a   weakly enhancing lymph node.     RETROPERITONEUM: Confluent adenopathy surrounding the aorta, SMA, and   iliac vessels measuring up to 7.5 x 4.2 cm (4:90) at the level of the   infrarenal aorta is without significant change.    ABDOMINAL WALL: Within normal limits.  BONES: Left hip arthroplasty. Degenerative changes of the spine, which   indicates mild curvature. Compression deformity of the T12 vertebral body   is reidentified.    IMPRESSION:   *  Regionof enhancement measuring 9 mm in diameter and on arterial phase   images along the posterior aspect of the gastric antrum. This lesion   appears extrinsic to the stomach, and is favored to be a weakly enhancing   lymph node rather than evidence of active GI bleed. No definitive   evidence of active GI bleed on arterial phase images.  *  Extensive lymphadenopathy in the chest, abdomen, and pelvis with   findings of peritoneal carcinomatosis.  *  Heterogeneous enhancement of the liver with multipleliver lesions   which are more apparent on the current exam, concerning for metastatic   disease. In the appropriate clinical context, liver abscess may also be   considered.

## 2018-12-08 NOTE — DISCHARGE NOTE ADULT - HOSPITAL COURSE
Pt is a 78y old Male with hx of prostate CA, with abdominal carcinomatosis, bilat nephrostomies for obstruction, recent ESBL+ UTI, adm from [facility] with coffee ground emesis and hypotension. He has been transfused, BP still low, has not been able to have EGD. Today, he had chest pain and was moaning loudly, calling out and family requested Palliative Medicine consult - daughters want comfort measures only. Pt is a 78y old Male with hx of prostate CA, with abdominal carcinomatosis, bilateral nephrostomies for obstruction, recent ESBL+ UTI sent from [facility] due to coffee ground emesis and hypotension (60/42). Patient was transfused 1 unit of PRBC, with good response, started on Protonix and IVF  with planned Endoscopy for a presumed GI bleed. On the day of planned transfusion, patient became hypotensive (70/40), hypoxemic (70% O2) and EGD was cancelled. Patient was then transfused another unit PRBC due to acute drop in Hg, bolused with IVF and started on supplemental O2. Hospital course was complicated by hypernatremia and repeat episode of patient complaining of pain. Palliative Care consulted concerning GOC and patient's daughter (& surrogate) requesting comfort measures and transfer to inpatient hospice. Pt is a 78y old Male with hx of prostate CA, with abdominal carcinomatosis, bilateral nephrostomies for obstruction, recent ESBL+ UTI sent from [facility] due to coffee ground emesis and hypotension (60/42). Patient was transfused 1 unit of PRBC, with good response, started on Protonix and IVF  with planned Endoscopy for a presumed GI bleed. On the day of planned transfusion, patient became hypotensive (70/40), hypoxemic (70% O2) and EGD was cancelled. Patient was then transfused another unit PRBC due to acute drop in Hg, bolused with IVF and started on supplemental O2. Hospital course was complicated by hypernatremia and repeat episode of patient complaining of pain. Palliative Care consulted concerning GOC and patient's daughter (& surrogate) requesting comfort measures and transfer to inpatient hospice. Patient was started on dilaudid infusion to treat pain and PRN ativan. Pt is a 78y old Male with hx of prostate CA, with abdominal carcinomatosis, bilateral nephrostomies for obstruction, recent ESBL+ UTI sent from [facility] due to coffee ground emesis and hypotension (60/42) and was subsequently admitted with a Gastrointestinal bleed. Patient was transfused 1 unit of PRBC, and had good response. Pt was later started on Protonix and IVF  with planned Endoscopy for a presumed GI bleed. On the day of planned endoscopy patient became hypotensive (70/40), hypoxemic (70% O2) and EGD was cancelled. Patient was then transfused another unit PRBC due to acute drop in Hg, bolused with IVF and started on supplemental O2. Hospital course was complicated by hypernatremia and repeat episode of patient complaining of pain. Palliative Care consulted concerning GOC and patient's daughter (& surrogate) requesting comfort measures and transfer to inpatient hospice. Patient was started on dilaudid infusion to treat pain and PRN ativan.

## 2018-12-08 NOTE — DISCHARGE NOTE ADULT - MEDICATION SUMMARY - MEDICATIONS TO TAKE
I will START or STAY ON the medications listed below when I get home from the hospital:    HYDROmorphone 10 mg/50 mL-NaCl 0.9% intravenous solution  -- 1 mg/hr intravenous now  -- Indication: For Pain    HYDROmorphone 0.5 mg/mL-NaCl 0.9% intravenous solution  -- 0.5 milligram(s) intravenous every 2 hours breakthrough pain  -- Indication: For Pain    LORazepam  -- 0.5 milligram(s) intravenous every 6 hours, As Needed for agitation or pain  -- Indication: For Agitation    ondansetron 2 mg/mL injectable solution  -- 4 milligram(s) injectable every 6 hours, As Needed  -- Indication: For Pallitive- [place]    sodium chloride 0.9% injectable solution  --  injectable   -- Indication: For Pain    pantoprazole 40 mg intravenous injection  -- 40 milligram(s) intravenous 2 times a day  -- Indication: For . I will START or STAY ON the medications listed below when I get home from the hospital:    HYDROmorphone 0.5 mg/mL-NaCl 0.9% intravenous solution  -- 0.5 milligram(s) intravenous every 2 hours breakthrough pain  -- Indication: For Pain    HYDROmorphone 10 mg/50 mL-NaCl 0.9% intravenous solution  -- 0.2 mg/hr intravenous now  -- Indication: For Pain    LORazepam  -- 0.5 milligram(s) intravenous every 6 hours, As Needed for agitation or pain  -- Indication: For Agitation    ondansetron 2 mg/mL injectable solution  -- 4 milligram(s) injectable every 6 hours, As Needed  -- Indication: For Pallitive- [place]    sodium chloride 0.9% injectable solution  --  injectable   -- Indication: For Pain    pantoprazole 40 mg intravenous injection  -- 40 milligram(s) intravenous 2 times a day  -- Indication: For .

## 2018-12-08 NOTE — PROGRESS NOTE ADULT - SUBJECTIVE AND OBJECTIVE BOX
SUBJECTIVE: HPI (12/5/2018) 77 y/o male with CAD, hyperlipidemia, GERD, metastatic prostate CA with peritoneal carcinomatosis, neurogenic bladder s/p b/l nephrostomies, AAA, presents to the ED for further evaluation of episodes of 'coffee ground emesis.' As per EMS, NH, and ED documentation (patient unable to provide significant HPI due to current medical condition) the patient was notably hypotensive at [place] (60/42) and upon arrival to  (BP 84/68 in triage). Labs => Hgb/Hct 9.7/32.5 -> 8.4/27.9, , INR 1.74, Na 147, BUN/Cr 52/1.76, Alb 2.5, , , UA (+). In the ED the patient was given NS x 3L, started on a PPI gtt per protocol, and given Piperacillin/tazobactam 3.375g IVPB x 1.     12/8/2018: Overnight, events noted- patient complained of chest pain; EKG w/ no ST changed; trops mildly elevated. Family now wishing for comfort care.     REVIEW OF SYSTEMS:  CONSTITUTIONAL: No weakness, fevers or chills  EYES/ENT: No visual changes;  No vertigo or throat pain   NECK: No pain or stiffness  RESPIRATORY: No cough, wheezing, hemoptysis; No shortness of breath  CARDIOVASCULAR: No chest pain or palpitations  GASTROINTESTINAL: No abdominal or epigastric pain. No nausea, vomiting, or hematemesis; No diarrhea or constipation. No melena or hematochezia.  GENITOURINARY: No dysuria, frequency or hematuria  NEUROLOGICAL: No numbness or weakness  SKIN: No itching, burning, rashes, or lesions   All other review of systems is negative unless indicated above    Vital Signs Last 24 Hrs  T(C): 36.3 (08 Dec 2018 11:04), Max: 36.5 (07 Dec 2018 22:15)  T(F): 97.4 (08 Dec 2018 11:04), Max: 97.7 (07 Dec 2018 22:15)  HR: 82 (08 Dec 2018 11:04) (72 - 89)  BP: 105/60 (08 Dec 2018 11:04) (82/44 - 105/60)  BP(mean): --  RR: 16 (08 Dec 2018 11:04) (15 - 16)  SpO2: 98% (08 Dec 2018 11:04) (98% - 100%)    I&O's Summary    07 Dec 2018 07:01  -  08 Dec 2018 07:00  --------------------------------------------------------  IN: 2249 mL / OUT: 1100 mL / NET: 1149 mL        CAPILLARY BLOOD GLUCOSE          PHYSICAL EXAM:  Constitutional: NAD, awake and alert, well-developed  HEENT: PERR, EOMI, Normal Hearing, MMM  Neck: Soft and supple, No LAD, No JVD  Respiratory: Breath sounds are clear bilaterally, No wheezing, rales or rhonchi  Cardiovascular: S1 and S2, regular rate and rhythm, no Murmurs, gallops or rubs  Gastrointestinal: Bowel Sounds present, soft, nontender, nondistended, no guarding, no rebound  Extremities: No peripheral edema  Vascular: 2+ peripheral pulses  Neurological: A/O x 3, no focal deficits  Musculoskeletal: 5/5 strength b/l upper and lower extremities  Skin: No rashes    MEDICATIONS:  MEDICATIONS  (STANDING):  fentaNYL   Patch  12 MICROgram(s)/Hr 1 Patch Transdermal every 72 hours  pantoprazole  Injectable 40 milliGRAM(s) IV Push two times a day      LABS: All Labs Reviewed:                        9.4    5.51  )-----------( 77       ( 08 Dec 2018 07:51 )             30.4     12-08    154<H>  |  121<H>  |  31<H>  ----------------------------<  92  3.3<L>   |  27  |  1.04    Ca    8.2<L>      08 Dec 2018 07:51  Phos  3.0     12-08  Mg     2.1     12-08        CARDIAC MARKERS ( 08 Dec 2018 00:30 )  0.064 ng/mL / x     / x     / x     / x      CARDIAC MARKERS ( 07 Dec 2018 21:43 )  0.070 ng/mL / x     / x     / x     / x          Blood Culture: 12-05 @ 13:36  Organism --  Gram Stain Blood -- Gram Stain --  Specimen Source .Urine None  Culture-Blood --        RADIOLOGY/EKG:    DVT PPX:    ADVANCED DIRECTIVE:    DISPOSITION: SUBJECTIVE: HPI (12/5/2018) 77 y/o male with CAD, hyperlipidemia, GERD, metastatic prostate CA with peritoneal carcinomatosis, neurogenic bladder s/p b/l nephrostomies, AAA, presents to the ED for further evaluation of episodes of 'coffee ground emesis.' As per EMS, NH, and ED documentation (patient unable to provide significant HPI due to current medical condition) the patient was notably hypotensive at [place] (60/42) and upon arrival to  (BP 84/68 in triage). Labs => Hgb/Hct 9.7/32.5 -> 8.4/27.9, , INR 1.74, Na 147, BUN/Cr 52/1.76, Alb 2.5, , , UA (+). In the ED the patient was given NS x 3L, started on a PPI gtt per protocol, and given Piperacillin/tazobactam 3.375g IVPB x 1.     12/8/2018: Overnight, events noted- patient complained of chest pain; EKG w/ no ST changed; trops mildly elevated. Family now wishing for comfort care and Palliative Care consulted.   This AM patient was occasionally calling out in pain but then would fall asleep immediately afterwards.     REVIEW OF SYSTEMS:  CONSTITUTIONAL: No weakness, fevers or chills  EYES/ENT: No visual changes;  No vertigo or throat pain   NECK: No pain or stiffness  RESPIRATORY: No cough, wheezing, hemoptysis; No shortness of breath  CARDIOVASCULAR: No chest pain or palpitations  GASTROINTESTINAL: No abdominal or epigastric pain. No nausea, vomiting, or hematemesis; No diarrhea or constipation. No melena or hematochezia.  GENITOURINARY: No dysuria, frequency or hematuria  NEUROLOGICAL: No numbness or weakness  SKIN: No itching, burning, rashes, or lesions   All other review of systems is negative unless indicated above    Vital Signs Last 24 Hrs  T(C): 36.3 (08 Dec 2018 11:04), Max: 36.5 (07 Dec 2018 22:15)  T(F): 97.4 (08 Dec 2018 11:04), Max: 97.7 (07 Dec 2018 22:15)  HR: 82 (08 Dec 2018 11:04) (72 - 89)  BP: 105/60 (08 Dec 2018 11:04) (82/44 - 105/60)  BP(mean): --  RR: 16 (08 Dec 2018 11:04) (15 - 16)  SpO2: 98% (08 Dec 2018 11:04) (98% - 100%)    I&O's Summary    07 Dec 2018 07:01  -  08 Dec 2018 07:00  --------------------------------------------------------  IN: 2249 mL / OUT: 1100 mL / NET: 1149 mL        CAPILLARY BLOOD GLUCOSE          PHYSICAL EXAM:  Constitutional: NAD, awake and alert, well-developed  HEENT: PERR, EOMI, Normal Hearing, MMM  Neck: Soft and supple, No LAD, No JVD  Respiratory: Breath sounds are clear bilaterally, No wheezing, rales or rhonchi  Cardiovascular: S1 and S2, regular rate and rhythm, no Murmurs, gallops or rubs  Gastrointestinal: Bowel Sounds present, soft, nontender, nondistended, no guarding, no rebound  Extremities: No peripheral edema  Vascular: 2+ peripheral pulses  Neurological: A/O x 3, no focal deficits  Musculoskeletal: 5/5 strength b/l upper and lower extremities  Skin: No rashes    MEDICATIONS:  MEDICATIONS  (STANDING):  fentaNYL   Patch  12 MICROgram(s)/Hr 1 Patch Transdermal every 72 hours  pantoprazole  Injectable 40 milliGRAM(s) IV Push two times a day      LABS: All Labs Reviewed:                        9.4    5.51  )-----------( 77       ( 08 Dec 2018 07:51 )             30.4     12-08    154<H>  |  121<H>  |  31<H>  ----------------------------<  92  3.3<L>   |  27  |  1.04    Ca    8.2<L>      08 Dec 2018 07:51  Phos  3.0     12-08  Mg     2.1     12-08        CARDIAC MARKERS ( 08 Dec 2018 00:30 )  0.064 ng/mL / x     / x     / x     / x      CARDIAC MARKERS ( 07 Dec 2018 21:43 )  0.070 ng/mL / x     / x     / x     / x          Blood Culture: 12-05 @ 13:36  Organism --  Gram Stain Blood -- Gram Stain --  Specimen Source .Urine None  Culture-Blood --        RADIOLOGY/EKG:    DVT PPX:    ADVANCED DIRECTIVE:    DISPOSITION: SUBJECTIVE: HPI (12/5/2018) 77 y/o male with CAD, hyperlipidemia, GERD, metastatic prostate CA with peritoneal carcinomatosis, neurogenic bladder s/p b/l nephrostomies, AAA, presents to the ED for further evaluation of episodes of 'coffee ground emesis.' As per EMS, NH, and ED documentation (patient unable to provide significant HPI due to current medical condition) the patient was notably hypotensive at [place] (60/42) and upon arrival to  (BP 84/68 in triage). Labs => Hgb/Hct 9.7/32.5 -> 8.4/27.9, , INR 1.74, Na 147, BUN/Cr 52/1.76, Alb 2.5, , , UA (+). In the ED the patient was given NS x 3L, started on a PPI gtt per protocol, and given Piperacillin/tazobactam 3.375g IVPB x 1.     12/8/2018: Overnight, events noted- patient complained of chest pain; EKG w/ no ST changed; trops mildly elevated. Family now wishing for comfort care and Palliative Care consulted.   This AM patient was occasionally calling out in pain but then would fall asleep immediately afterwards. Patient awakes to verbal stimuli but not answering questions this morning.     REVIEW OF SYSTEMS: unable to assess    Vital Signs Last 24 Hrs  T(C): 36.3 (08 Dec 2018 11:04), Max: 36.5 (07 Dec 2018 22:15)  T(F): 97.4 (08 Dec 2018 11:04), Max: 97.7 (07 Dec 2018 22:15)  HR: 82 (08 Dec 2018 11:04) (72 - 89)  BP: 105/60 (08 Dec 2018 11:04) (82/44 - 105/60)  BP(mean): --  RR: 16 (08 Dec 2018 11:04) (15 - 16)  SpO2: 98% (08 Dec 2018 11:04) (98% - 100%)    I&O's Summary    07 Dec 2018 07:01  -  08 Dec 2018 07:00  --------------------------------------------------------  IN: 2249 mL / OUT: 1100 mL / NET: 1149 mL        CAPILLARY BLOOD GLUCOSE          PHYSICAL EXAM:  Constitutional: NAD, sleeping, thin, elderly man; lying on left side in hospital bed  HEENT: PERR, EOMI, MMM  Respiratory: decreased inspiratory effort; no rhonci or wheezes appreciated  Cardiovascular: S1 and S2, regular rate and rhythm, no Murmurs, gallops or rubs  Gastrointestinal: Bowel Sounds present, soft, nondistended, no guarding  Extremities: thin extremities; excoriations noted on b/l lower extremities; mild pitting edema noted in all four extremities  Neurological: unable to assess focal deficits  Musculoskeletal: unable to assess as patient not following commands; moving all four limbs independently  Skin: No rashes    MEDICATIONS:  MEDICATIONS  (STANDING):  fentaNYL   Patch  12 MICROgram(s)/Hr 1 Patch Transdermal every 72 hours  pantoprazole  Injectable 40 milliGRAM(s) IV Push two times a day    MEDICATIONS  (PRN):  HYDROmorphone  Injectable 0.5 milliGRAM(s) IV Push every 2 hours PRN pain or dyspnea  LORazepam   Injectable 0.5 milliGRAM(s) IV Push every 4 hours PRN Anxiety  ondansetron Injectable 4 milliGRAM(s) IV Push every 6 hours PRN Nausea      LABS: All Labs Reviewed:                        9.4    5.51  )-----------( 77       ( 08 Dec 2018 07:51 )             30.4     12-08    154<H>  |  121<H>  |  31<H>  ----------------------------<  92  3.3<L>   |  27  |  1.04    Ca    8.2<L>      08 Dec 2018 07:51  Phos  3.0     12-08  Mg     2.1     12-08        CARDIAC MARKERS ( 08 Dec 2018 00:30 )  0.064 ng/mL / x     / x     / x     / x      CARDIAC MARKERS ( 07 Dec 2018 21:43 )  0.070 ng/mL / x     / x     / x     / x          Blood Culture: 12-05 @ 13:36  Organism --  Gram Stain Blood -- Gram Stain --  Specimen Source .Urine None  Culture-Blood --

## 2018-12-08 NOTE — GOALS OF CARE CONVERSATION - PERSONAL ADVANCE DIRECTIVE - CONVERSATION DETAILS
Pt was moaning loudly earlier and daughter at bedside requested palliative care consult as she wants her father to be comfortable - does not want any further treatments or interventions - requests "comfort care only". Pt was living with a woman, Juan Luis [place] - we contacted her and she defers all medical decisions to daughter Berta. Berta states she is the HCP, but cannot produce the paperwork. Daughter Nimesh (on phone) is in agreement with comfort care plan- states these are her father's wishes.   Friend Arturo in agreement as well.   Daughters are interested in [facility] in-pt hosp[facility] - referred to .

## 2018-12-08 NOTE — DISCHARGE NOTE ADULT - PATIENT PORTAL LINK FT
You can access the [facility] Patient Portal, offered by [facility], by registering with the following website: [email or web address]

## 2018-12-08 NOTE — DISCHARGE NOTE ADULT - MEDICATION SUMMARY - MEDICATIONS TO STOP TAKING
I will STOP taking the medications listed below when I get home from the hospital:    bicalutamide 50 mg oral tablet  -- 1 tab(s) by mouth every 24 hours    Multiple Vitamins oral tablet  -- 1 tab(s) by mouth once a day    metoprolol tartrate 25 mg oral tablet  -- 1 tab(s) by mouth 3 times a day    acetaminophen 325 mg oral tablet  -- 2 tab(s) by mouth every 6 hours, As needed, Temp greater or equal to 38C (100.4F), Mild Pain (1 - 3)    aluminum hydroxide-magnesium hydroxide 200 mg-200 mg/5 mL oral suspension  -- 30 milliliter(s) by mouth every 6 hours, As needed, Dyspepsia    oxyCODONE-acetaminophen 5 mg-325 mg oral tablet  -- 1 tab(s) by mouth every 6 hours, As Needed

## 2018-12-08 NOTE — PROGRESS NOTE ADULT - ASSESSMENT
77 y/o male with CAD, hyperlipidemia, GERD, metastatic prostate CA with peritoneal carcinomatosis, neurogenic bladder s/p b/l nephrostomies, AAA, presents to the ED for further evaluation of episodes of 'coffee ground emesis.' Patient found to be hypotensive 2/2 to upper GI bleed.     #Upper GI Bleed  - no episodes of hematemesis or melena since admission  - s/p 2 units PRBC  - Protonix 40mg IV push BID (unable to have protonix drip 2/2 lack of IV access)  - plans for work-up for GI bleed cancelled as patient is comfort care.   - no additional blood draws at this time    #Stage IV prostate cancer  - likely cause of severe abdominal pain  - Dilaudid 0.5mg q2 PRN agitation or SOB added  - Ativan 0.5mg q4 PRN agitation addded  - Fentanyl patch continued    #Advanced Directives  - DNR/DNI now with comfort measures    Dispo:  - planned d/c to inpatient hospice when possible 79 y/o male with CAD, hyperlipidemia, GERD, metastatic prostate CA with peritoneal carcinomatosis, neurogenic bladder s/p b/l nephrostomies, AAA, presents to the ED for further evaluation of episodes of 'coffee ground emesis.' Patient found to be hypotensive 2/2 to upper GI bleed.     #Upper GI Bleed  - no episodes of hematemesis or melena since admission  - s/p 2 units PRBC  - Protonix 40mg IV push BID (unable to have protonix drip 2/2 lack of IV access)  - plans for work-up for GI bleed cancelled as patient is comfort care.   - no additional blood draws at this time    #Stage IV prostate cancer  - likely cause of severe abdominal pain  - Dilaudid 0.5mg q2 PRN agitation or SOB added  - Ativan 0.5mg q4 PRN agitation addded  - Fentanyl patch continued  - palliative care recommendations appreciated    #Advanced Directives  - DNR/DNI now with comfort measures    Dispo:  - planned d/c to inpatient hospice when possible

## 2018-12-08 NOTE — DISCHARGE NOTE ADULT - PLAN OF CARE
Comfort measures comfort measures Hematemesis likely caused by GI bleed. Investigation of source of bleeding halted as you decided goals were comfort care. You will continue care in inpatient hospice. Your pain and confusion was likely caused by prostate cancer spread throughout your abdomen. You will be continued on pain and anti-anxiety medicines to make you comfortable while at the inpatient hospice.

## 2018-12-08 NOTE — CONSULT NOTE ADULT - SUBJECTIVE AND OBJECTIVE BOX
HPI: Pt is a 78y old Male with hx of prostate CA, with abdominal carcinomatosis, bilat nephrostomies for obstruction, recent ESBL+ UTI, adm from [facility] with coffee ground emesis and hypotension. He has been transfused, BP still low, has not been able to have EGD. Today, he had chest pain and was moaning loudly, calling out and family requested Palliative Medicine consult - daughters want comfort measures only.           PAIN: (x )Yes   ( )No  Apparent pain - pt cannot answer questions now - told RN earlier he had chest pain - was yelling, moaning - this stopped with a dose of Dilaudid, recurred a few hours later  Level: severe apparently - was yelling  Location: chest  Intensity:    /10 - pt cannot answer   Quality: cannot answer  Aggravating Factors: cannot answer   Alleviating Factors:  Dilaudid stopped   Radiation: ?  Duration/Timing:?  Impact on ADLs: ?     DYSPNEA: ( ) Yes  (x ) No  Does not appear SOB  Level:    PAST MEDICAL & SURGICAL HISTORY:  CAD (coronary artery disease)  HLD (hyperlipidemia)  GERD (gastroesophageal reflux disease)  Colon obstruction  Prostate CA with abdominal carcinomatosis  Gallstones  Neurogenic bladder  Carotid stenosis: right and left  AAA (abdominal aortic aneurysm): 2010  Left rib fracture  S/P cataract surgery, right  H/O hemicolectomy  History of intestinal surgery  History of cholecystectomy  THR (Total Hip Replacement)  Substance abuse      SOCIAL HX: Lives with significant other,  Juan Luis,. . Has 2 adult daughters, Berta and Nimesh. [place] works [place] C[place] in [place].  Berta states she is the HCP but there is no paperwork on chart or that she can find. Significant other defers to Berta. Daughter Nimesh and friend Arturo all agree that Berta should be the decision maker.     Hx opiate tolerance (x )YES  ( )NO    Baseline ADLs  (Prior to Admission)  (x ) Independent   ( )Dependent    FAMILY HISTORY:  Family history of cerebrovascular accident (CVA) (Father, Mother)      Review of Systems: Cannot answer questions - barely rousable, non-verbal    Anxiety-  Depression-  Physical Discomfort-  Dyspnea-  Constipation-  Diarrhea-  Nausea-  Vomiting-  Anorexia-  Weight Loss-   Cough-  Secretions-  Fatigue-  Weakness-  Delirium-      Unable to obtain/Limited due to: non-verbal      PHYSICAL EXAM:    Vital Signs Last 24 Hrs  T(C): 36.3 (08 Dec 2018 11:04), Max: 36.5 (07 Dec 2018 22:15)  T(F): 97.4 (08 Dec 2018 11:04), Max: 97.7 (07 Dec 2018 22:15)  HR: 82 (08 Dec 2018 11:04) (72 - 89)  BP: 105/60 (08 Dec 2018 11:04) (76/40 - 105/60)  BP(mean): --  RR: 16 (08 Dec 2018 11:04) (15 - 16)  SpO2: 98% (08 Dec 2018 11:04) (96% - 100%)  Daily     Daily     PPSV2: 10  %  FAST:    General: Cachetic man, non-verbal, reaching out to examiner, moaning loudly.   Mental Status: Awake, non-verbal  HEENT: Mucosa pink and moist  Lungs: Rhonchi scattered.   Cardiac: RRR  GI: +BS Soft, no masses or tenderness. Bilat nephrostomy tubes with yellow urine  :  Ext: Cool digits   Neuro: Moving all extrems      LABS:                        9.4    5.51  )-----------( 77       ( 08 Dec 2018 07:51 )             30.4     12-08    154<H>  |  121<H>  |  31<H>  ----------------------------<  92  3.3<L>   |  27  |  1.04    Ca    8.2<L>      08 Dec 2018 07:51  Phos  3.0     12-08  Mg     2.1     12-08        Albumin: Albumin, Serum: 2.0 g/dL (12-06 @ 04:28)      Allergies    morphine (Vomiting)  sulfa drugs (Other)    Intolerances      MEDICATIONS  (STANDING):  fentaNYL   Patch  12 MICROgram(s)/Hr 1 Patch Transdermal every 72 hours  pantoprazole  Injectable 40 milliGRAM(s) IV Push two times a day    MEDICATIONS  (PRN):  HYDROmorphone  Injectable 0.5 milliGRAM(s) IV Push every 2 hours PRN pain or dyspnea  LORazepam   Injectable 0.5 milliGRAM(s) IV Push every 4 hours PRN Anxiety  ondansetron Injectable 4 milliGRAM(s) IV Push every 6 hours PRN Nausea      RADIOLOGY/ADDITIONAL STUDIES:

## 2018-12-08 NOTE — DISCHARGE NOTE ADULT - CARE PROVIDER_API CALL
Ramos Giraldo), Critical Care Medicine; HospicePalliative Medicine; Internal Medicine; Pulmonary Disease  [address] [place] [address]  Phone: [phone]  Fax: [phone]

## 2018-12-08 NOTE — GOALS OF CARE CONVERSATION - PERSONAL ADVANCE DIRECTIVE - TREATMENT GUIDELINES
Comfort measures only/No blood draws/No antibiotics/DNR Order/No artificial nutrition/No IV fluids/Do not re-hospitalize

## 2018-12-08 NOTE — CONSULT NOTE ADULT - ASSESSMENT
Pt is a 78y old Male with hx of prostate CA, with abdominal carcinomatosis, bilat nephrostomies for obstruction, recent ESBL+ UTI, adm from [facility] with coffee ground emesis and hypotension. He has been transfused, BP still low, has not been able to have EGD. Today, he had chest pain and was moaning loudly, calling out and family requested Palliative Medicine consult - daughters want comfort measures only.     1)Pain  -Nonverbal now so cannot ascertain location or quality of pain but he told RN earlier that he had chest pain  -Had loud moaning/yelling this AM when he reported pain and it stopped when he received Dilaudid 0.5 mg IVP x1.  -Had no EKG changes and minimally elevated troponins  -?pain related to GI system? - He was admitted with hematemesis  -Dilaudid 0.5 mg IVP Q2H PRN pain REGARDLESS of BP  -Would also give Ativan 0.5 mg IVP PRN for anxiety    2)Hypotension  -Admitted with Hypotension, GI bleed  -BP still low despite IV fluids and blood transfusions  -Family requests comfort care only - do not want pain meds held for low BP    3)Prostate CA  -Abdominal carcinomatosis  -Has bilat nephrostomies for obstruction    4)Dehydration  -Na+ = 154  -Has anasarca  -Stop IV fluids    5) Advance directives  -Has [facility] form from 11/30/2018 - DNI/DNR - signed by daughter Berta (from [place]).   -Daughter Berta states she is the HCP but cannot locate paperwork (and not in hospital system)  -Significant other Juan Luis defers decision making to Berta.   -Family all in agreement that comfort measures = treatment plan - request transfer to [facility] in-pt hospice if feasible - referral placed

## 2018-12-09 RX ORDER — HYDROMORPHONE HYDROCHLORIDE 2 MG/ML
0.2 INJECTION INTRAMUSCULAR; INTRAVENOUS; SUBCUTANEOUS
Qty: 10 | Refills: 0 | Status: DISCONTINUED | OUTPATIENT
Start: 2018-12-09 | End: 2018-12-10

## 2018-12-09 RX ORDER — HYDROMORPHONE HYDROCHLORIDE 2 MG/ML
0.2 INJECTION INTRAMUSCULAR; INTRAVENOUS; SUBCUTANEOUS
Qty: 100 | Refills: 0 | Status: DISCONTINUED | OUTPATIENT
Start: 2018-12-09 | End: 2018-12-09

## 2018-12-09 RX ORDER — SODIUM CHLORIDE 9 MG/ML
1000 INJECTION INTRAMUSCULAR; INTRAVENOUS; SUBCUTANEOUS
Qty: 0 | Refills: 0 | Status: DISCONTINUED | OUTPATIENT
Start: 2018-12-09 | End: 2018-12-10

## 2018-12-09 RX ORDER — POTASSIUM CHLORIDE 20 MEQ
40 PACKET (EA) ORAL ONCE
Qty: 0 | Refills: 0 | Status: DISCONTINUED | OUTPATIENT
Start: 2018-12-09 | End: 2018-12-09

## 2018-12-09 RX ADMIN — PANTOPRAZOLE SODIUM 40 MILLIGRAM(S): 20 TABLET, DELAYED RELEASE ORAL at 04:49

## 2018-12-09 RX ADMIN — Medication 0.5 MILLIGRAM(S): at 23:31

## 2018-12-09 RX ADMIN — HYDROMORPHONE HYDROCHLORIDE 1 MG/HR: 2 INJECTION INTRAMUSCULAR; INTRAVENOUS; SUBCUTANEOUS at 12:18

## 2018-12-09 RX ADMIN — HYDROMORPHONE HYDROCHLORIDE 0.5 MILLIGRAM(S): 2 INJECTION INTRAMUSCULAR; INTRAVENOUS; SUBCUTANEOUS at 00:42

## 2018-12-09 RX ADMIN — Medication 0.5 MILLIGRAM(S): at 14:42

## 2018-12-09 RX ADMIN — Medication 0.5 MILLIGRAM(S): at 07:25

## 2018-12-09 RX ADMIN — Medication 0.5 MILLIGRAM(S): at 18:38

## 2018-12-09 RX ADMIN — PANTOPRAZOLE SODIUM 40 MILLIGRAM(S): 20 TABLET, DELAYED RELEASE ORAL at 18:42

## 2018-12-09 RX ADMIN — HYDROMORPHONE HYDROCHLORIDE 0.5 MILLIGRAM(S): 2 INJECTION INTRAMUSCULAR; INTRAVENOUS; SUBCUTANEOUS at 10:38

## 2018-12-09 RX ADMIN — HYDROMORPHONE HYDROCHLORIDE 0.5 MILLIGRAM(S): 2 INJECTION INTRAMUSCULAR; INTRAVENOUS; SUBCUTANEOUS at 04:48

## 2018-12-09 NOTE — PROGRESS NOTE ADULT - ASSESSMENT
79 y/o male with CAD, hyperlipidemia, GERD, metastatic prostate CA with peritoneal carcinomatosis, neurogenic bladder s/p b/l nephrostomies, AAA, presents to the ED for further evaluation of episodes of 'coffee ground emesis.' Patient found to be hypotensive 2/2 to upper GI bleed.     #Upper GI Bleed  - no episodes of hematemesis or melena since admission  - s/p 2 units PRBC  - Protonix 40mg IV push BID (unable to have protonix drip 2/2 lack of IV access)  -no further plans for work-up for GI bleed cancelled as patient is comfort care.   - no additional blood draws at this time    #Stage IV prostate cancer  - likely cause of severe abdominal pain  - Dilaudid 0.5mg q2 PRN agitation or SOB added  - Ativan 0.5mg q4 PRN agitation addded  - Fentanyl patch continued  - palliative care recommendations appreciated    #Advanced Directives  - DNR/DNI now with comfort measures    Dispo:  - planned d/c to inpatient hospice when possible

## 2018-12-09 NOTE — PROGRESS NOTE ADULT - SUBJECTIVE AND OBJECTIVE BOX
HPI: Pt is a 78y old Male with hx of prostate CA, with abdominal carcinomatosis, bilat nephrostomies for obstruction, recent ESBL+ UTI, adm from [facility] with coffee ground emesis and hypotension. He has been transfused, BP still low, has not been able to have EGD. On 12/08,  he had chest pain and was moaning loudly, calling out and family requested Palliative Medicine consult - daughters want comfort measures only.   12/09 Pt has had 7 doses of Dilaudid 0.5 mg IVP and Ativan 0.5 mg IVP x 1 and is calling out, moaning, gnashing his teeth. Is non-verbal and does not nod/shake head in response to questions.              PAIN: ( x)Yes   ( )No  Subjectively, the pt appears to be in pain - is nonverbal - cannot ascertain characteristics of pain  Level: on 12/08, he reported chest pain - today is nonverbal  Location:  Intensity:    /10  Quality:  Aggravating Factors:  Alleviating Factors:  Radiation:  Duration/Timing:  Impact on ADLs:    DYSPNEA: ( ) Yes  (x ) No Not apparently SOB  Level:        Review of Systems:    Anxiety-  Depression-  Physical Discomfort-  Dyspnea-  Constipation-  Diarrhea-  Nausea-  Vomiting-  Anorexia-  Weight Loss-   Cough-  Secretions-  Fatigue-  Weakness-  Delirium-      Unable to obtain/Limited due to: Non-verbal        PHYSICAL EXAM:    Vital Signs Last 24 Hrs  T(C): 36.3 (08 Dec 2018 11:04), Max: 36.3 (08 Dec 2018 11:04)  T(F): 97.4 (08 Dec 2018 11:04), Max: 97.4 (08 Dec 2018 11:04)  HR: 82 (08 Dec 2018 11:04) (82 - 82)  BP: 105/60 (08 Dec 2018 11:04) (105/60 - 105/60)  BP(mean): --  RR: 16 (08 Dec 2018 11:04) (16 - 16)  SpO2: 98% (08 Dec 2018 11:04) (98% - 98%)  Daily     Daily     PPSV2:  10 %  FAST:    General: Awake, non-verbal, yelling/moaning, hitting head with fists  HEENT: dried blood ~ lips  Lungs: Rhonchi bilat  Cardiac: RRR  GI: +BS Soft, no masses or tenderness  : Bilat nephrostomy tubes with yellow urine   Ext: Edema BUE's   Neuro: Moving all extrems      LABS:                        9.4    5.51  )-----------( 76       ( 08 Dec 2018 07:51 )             30.4     12-08    154<H>  |  121<H>  |  31<H>  ----------------------------<  92  3.3<L>   |  27  |  1.04    Ca    8.2<L>      08 Dec 2018 07:51  Phos  3.0     12-08  Mg     2.1     12-08        Albumin: Albumin, Serum: 2.0 g/dL (12-06 @ 04:28)      Allergies    morphine (Vomiting)  sulfa drugs (Other)    Intolerances      MEDICATIONS  (STANDING):  HYDROmorphone Infusion 0.2 mG/Hr (0.2 mL/Hr) IV Continuous <Continuous>  pantoprazole  Injectable 40 milliGRAM(s) IV Push two times a day    MEDICATIONS  (PRN):  HYDROmorphone  Injectable 0.5 milliGRAM(s) IV Push every 2 hours PRN pain or dyspnea  LORazepam   Injectable 0.5 milliGRAM(s) IV Push every 4 hours PRN Anxiety  ondansetron Injectable 4 milliGRAM(s) IV Push every 6 hours PRN Nausea      RADIOLOGY:

## 2018-12-09 NOTE — PROGRESS NOTE ADULT - ASSESSMENT
Pt is a 78y old Male with hx of prostate CA, with abdominal carcinomatosis, bilat nephrostomies for obstruction, recent ESBL+ UTI, adm from [facility] with coffee ground emesis and hypotension. He has been transfused, BP still low, has not been able to have EGD. On 12/08,  he had chest pain and was moaning loudly, calling out and family requested Palliative Medicine consult - daughters want comfort measures only.     1)Pain  -Nonverbal now so cannot ascertain location or quality of pain but he told RN earlier that he had chest pain  -Had loud moaning/yelling throughout 12/08,  it stopped when he received Dilaudid 0.5 mg IVP.  -Had no EKG changes and minimally elevated troponins  -?pain related to GI system? - He was admitted with hematemesis  -Will begin low-dose continuous infusion of Dilaudid to try to mauntain a baseline pain control  -D/C Fentanyl patch  -Continue Dilaudid 0.5 mg IVP Q2H PRN pain REGARDLESS of BP FOR BREAKTHROUGH PAIN  -Would also give Ativan 0.5 mg IVP ; will change this to Q6H ATC     2)Hypotension  -Admitted with Hypotension, GI bleed  -BP still low despite IV fluids and blood transfusions  -Family requests comfort care only - do not want pain meds held for low BP    3)Prostate CA  -Abdominal carcinomatosis  -Has bilat nephrostomies for obstruction    4)Dehydration  -Last checked Na+ = 154  -Has anasarca  -Stop IV fluids    5) Advance directives  -Has MOLST form from 11/30/2018 - DNI/DNR - signed by daughter Berta (from [place]).   -Daughter Berta states she is the HCP but cannot locate paperwork (and not in hospital system)  -Significant other Juan Luis defers decision making to Berta.   -Family all in agreement that comfort measures = treatment plan - request transfer to [facility]-pt hospice if feasible - referral placed; no response yet from [facility] - they are reviewing

## 2018-12-09 NOTE — PROGRESS NOTE ADULT - ATTENDING COMMENTS
Patient seen and examined with Family Medicine Residents Miguel Rivas and Tabitha Lee on the Family Medicine Teaching Service.  Agree with history, physical, labs and plan which were reviewed in detail after a face to face encounter with the patient.
Patient seen and examined with Family Medicine Residents Miguel Rivas on the Family Medicine Teaching Service.  Agree with history, physical, labs and plan which were reviewed in detail after a face to face encounter with the patient.
Patient seen and examined with Family Medicine Residents Miguel Greenwood and Brittany Metzger on the Family Medicine Teaching Service.  Agree with history, physical, labs and plan which were reviewed in detail after a face to face encounter with the patient.
Patient seen and examined with Family Medicine Residents Miguel Rivas, Tabitha Lee and Brittany Metzger on the Family Medicine Teaching Service.  Agree with history, physical, labs and plan which were reviewed in detail after a face to face encounter with the patient.

## 2018-12-09 NOTE — PROGRESS NOTE ADULT - SUBJECTIVE AND OBJECTIVE BOX
SUBJECTIVE: HPI (12/5/2018) 77 y/o male with CAD, hyperlipidemia, GERD, metastatic prostate CA with peritoneal carcinomatosis, neurogenic bladder s/p b/l nephrostomies, AAA, presents to the ED for further evaluation of episodes of 'coffee ground emesis.' As per EMS, NH, and ED documentation (patient unable to provide significant HPI due to current medical condition) the patient was notably hypotensive at [place] (60/42) and upon arrival to  (BP 84/68 in triage). Labs => Hgb/Hct 9.7/32.5 -> 8.4/27.9, , INR 1.74, Na 147, BUN/Cr 52/1.76, Alb 2.5, , , UA (+). In the ED the patient was given NS x 3L, started on a PPI gtt per protocol, and given Piperacillin/tazobactam 3.375g IVPB x 1.     12/8/2018: Overnight, events noted- patient complained of chest pain; EKG w/ no ST changed; trops mildly elevated. Family now wishing for comfort care and Palliative Care consulted.   This AM patient was occasionally calling out in pain but then would fall asleep immediately afterwards. Patient awakes to verbal stimuli but not answering questions this morning.     12/9/18: Pt seen and examined at bedside with daughter at bedside. Pt still intermittently wakes up in pain. Unable to provide any narrative on pain. Currently comfort care.    REVIEW OF SYSTEMS: unable to assess    Vital Signs Last 24 Hrs  T(C): --  T(F): --  HR: --  BP: --  BP(mean): --  RR: --  SpO2: --          PHYSICAL EXAM:  Constitutional: NAD, sleeping, thin, elderly man; lying on left side in hospital bed  HEENT: PERR, EOMI, MMM  Respiratory: decreased inspiratory effort; no rhonci or wheezes appreciated  Cardiovascular: S1 and S2, regular rate and rhythm, no Murmurs, gallops or rubs  Gastrointestinal: Bowel Sounds present, soft, nondistended, no guarding  Extremities: thin extremities; excoriations noted on b/l lower extremities; mild pitting edema noted in all four extremities  Neurological: unable to assess focal deficits  Musculoskeletal: unable to assess as patient not following commands; moving all four limbs independently  Skin: No rashes    MEDICATIONS:  MEDICATIONS  (STANDING):  fentaNYL   Patch  12 MICROgram(s)/Hr 1 Patch Transdermal every 72 hours  pantoprazole  Injectable 40 milliGRAM(s) IV Push two times a day    MEDICATIONS  (PRN):  HYDROmorphone  Injectable 0.5 milliGRAM(s) IV Push every 2 hours PRN pain or dyspnea  LORazepam   Injectable 0.5 milliGRAM(s) IV Push every 4 hours PRN Anxiety  ondansetron Injectable 4 milliGRAM(s) IV Push every 6 hours PRN Nausea      Lab Results:  CBC  CBC Full  -  ( 08 Dec 2018 07:51 )  WBC Count : 5.51 K/uL  Hemoglobin : 9.4 g/dL  Hematocrit : 30.4 %  Platelet Count - Automated : 76 K/uL  Mean Cell Volume : 87.6 fl  Mean Cell Hemoglobin : 27.1 pg  Mean Cell Hemoglobin Concentration : 30.9 gm/dL  Auto Neutrophil # : x  Auto Lymphocyte # : x  Auto Monocyte # : x  Auto Eosinophil # : x  Auto Basophil # : x  Auto Neutrophil % : x  Auto Lymphocyte % : x  Auto Monocyte % : x  Auto Eosinophil % : x  Auto Basophil % : x    .		Differential:	[] Automated		[] Manual  Chemistry  12-08    154<H>  |  121<H>  |  31<H>  ----------------------------<  92  3.3<L>   |  27  |  1.04    Ca    8.2<L>      08 Dec 2018 07:51  Phos  3.0     12-08  Mg     2.1     12-08              MICROBIOLOGY/CULTURES:  Culture Results:   <10,000 CFU/ml Normal Urogenital camron present (12-05 @ 13:36)    RADIOLOGY RESULTS:    Toxicities (with grade)  1.  2.  3.  4.

## 2018-12-10 RX ORDER — HYDROMORPHONE HYDROCHLORIDE 2 MG/ML
0.5 INJECTION INTRAMUSCULAR; INTRAVENOUS; SUBCUTANEOUS
Qty: 0 | Refills: 0 | COMMUNITY
Start: 2018-12-10

## 2018-12-10 RX ORDER — SODIUM CHLORIDE 9 MG/ML
0 INJECTION INTRAMUSCULAR; INTRAVENOUS; SUBCUTANEOUS
Qty: 0 | Refills: 0 | COMMUNITY
Start: 2018-12-10

## 2018-12-10 RX ORDER — ONDANSETRON 8 MG/1
4 TABLET, FILM COATED ORAL
Qty: 0 | Refills: 0 | COMMUNITY
Start: 2018-12-10

## 2018-12-10 RX ORDER — PANTOPRAZOLE SODIUM 20 MG/1
40 TABLET, DELAYED RELEASE ORAL
Qty: 0 | Refills: 0 | COMMUNITY
Start: 2018-12-10

## 2018-12-10 RX ORDER — HYDROMORPHONE HYDROCHLORIDE 2 MG/ML
0.2 INJECTION INTRAMUSCULAR; INTRAVENOUS; SUBCUTANEOUS
Qty: 0 | Refills: 0 | COMMUNITY
Start: 2018-12-10

## 2018-12-10 RX ORDER — HYDROMORPHONE HYDROCHLORIDE 2 MG/ML
1 INJECTION INTRAMUSCULAR; INTRAVENOUS; SUBCUTANEOUS
Qty: 0 | Refills: 0 | COMMUNITY
Start: 2018-12-10

## 2018-12-10 RX ADMIN — HYDROMORPHONE HYDROCHLORIDE 0.5 MILLIGRAM(S): 2 INJECTION INTRAMUSCULAR; INTRAVENOUS; SUBCUTANEOUS at 09:20

## 2018-12-10 RX ADMIN — HYDROMORPHONE HYDROCHLORIDE 0.5 MILLIGRAM(S): 2 INJECTION INTRAMUSCULAR; INTRAVENOUS; SUBCUTANEOUS at 11:46

## 2018-12-10 RX ADMIN — HYDROMORPHONE HYDROCHLORIDE 0.5 MILLIGRAM(S): 2 INJECTION INTRAMUSCULAR; INTRAVENOUS; SUBCUTANEOUS at 09:36

## 2018-12-10 RX ADMIN — Medication 0.5 MILLIGRAM(S): at 05:44

## 2018-12-10 RX ADMIN — Medication 0.5 MILLIGRAM(S): at 11:21

## 2018-12-10 RX ADMIN — PANTOPRAZOLE SODIUM 40 MILLIGRAM(S): 20 TABLET, DELAYED RELEASE ORAL at 05:44

## 2018-12-10 NOTE — PROGRESS NOTE ADULT - REASON FOR ADMISSION
GI Bleeding

## 2018-12-10 NOTE — CHART NOTE - NSCHARTNOTEFT_GEN_A_CORE
Spoke with hospice admitting clinician Bradley this am who has accepted pt and wants to bring him over at 11:30 this am. Shared this with floor [facility] and Dr. Metzger (resident), who will arrange discharge. Message also left for Dr. Guevara.     Thank you for including us in Mr. Ghotra's care. Will continue to follow with you.    Zhou Juárez MD  Palliative Care Attending

## 2018-12-10 NOTE — PROGRESS NOTE ADULT - ASSESSMENT
77 y/o male with CAD, hyperlipidemia, GERD, metastatic prostate CA with peritoneal carcinomatosis, neurogenic bladder s/p b/l nephrostomies, AAA, presents to the ED for further evaluation of episodes of 'coffee ground emesis.' Patient found to be hypotensive 2/2 to upper GI bleed.     #Upper GI Bleed  - no episodes of hematemesis or melena since admission  - s/p 2 units PRBC  - Protonix 40mg IV push BID (unable to have protonix drip 2/2 lack of IV access)  -no further plans for work-up for GI bleed cancelled as patient is comfort care.   - no additional blood draws at this time    #Stage IV prostate cancer  - likely cause of severe abdominal pain  - Dilaudid infusion  - Dilaudid 0.5mg q2 PRN agitation or SOB added  - Ativan 0.5mg q4 PRN agitation added  - palliative care recommendations appreciated    #Advanced Directives  - DNR/DNI now with comfort measures    Dispo:  - d/c to inpatient hospice today

## 2018-12-10 NOTE — PROGRESS NOTE ADULT - SUBJECTIVE AND OBJECTIVE BOX
SUBJECTIVE: HPI (12/5/2018) 79 y/o male with CAD, hyperlipidemia, GERD, metastatic prostate CA with peritoneal carcinomatosis, neurogenic bladder s/p b/l nephrostomies, AAA, presents to the ED for further evaluation of episodes of 'coffee ground emesis.' As per EMS, NH, and ED documentation (patient unable to provide significant HPI due to current medical condition) the patient was notably hypotensive at [place] (60/42) and upon arrival to  (BP 84/68 in triage). Labs => Hgb/Hct 9.7/32.5 -> 8.4/27.9, , INR 1.74, Na 147, BUN/Cr 52/1.76, Alb 2.5, , , UA (+). In the ED the patient was given NS x 3L, started on a PPI gtt per protocol, and given Piperacillin/tazobactam 3.375g IVPB x 1.     12/10/18: Pt seen and examined at bedside with daughter at bedside. Daughter notes that the patient intermittently wakes up in pain. Unable to provide any narrative on pain. Currently comfort care with recent switch to dilaudid infusion. Patient [facility] be [facility].      SUBJECTIVE:     REVIEW OF SYSTEMS: unable to assess    I&O's Summary    10 Dec 2018 07:01  -  10 Dec 2018 17:13  --------------------------------------------------------  IN: 20.6 mL / OUT: 155 mL / NET: -134.4 mL    PHYSICAL EXAM:  Constitutional: NAD, sleeping, thin, elderly man; lying on left side in hospital bed  HEENT: PERR, EOMI, MMM  Respiratory: decreased inspiratory effort; no rhonci or wheezes appreciated  Cardiovascular: S1 and S2, regular rate and rhythm, no Murmurs, gallops or rubs  Gastrointestinal: Bowel Sounds present, soft, nondistended, no guarding  Extremities: thin extremities; excoriations noted on b/l lower extremities; mild pitting edema noted in all four extremities  Neurological: unable to assess focal deficits  Musculoskeletal: unable to assess as patient not following commands; moving all four limbs independently  Skin: No rashesPHYSICAL EXAM:  Constitutional: NAD, awake and alert, well-developed  HEENT: PERR, EOMI, Normal Hearing, MMM  Neck: Soft and supple, No LAD, No JVD  Respiratory: Breath sounds are clear bilaterally, No wheezing, rales or rhonchi  Cardiovascular: S1 and S2, regular rate and rhythm, no Murmurs, gallops or rubs  Gastrointestinal: Bowel Sounds present, soft, nontender, nondistended, no guarding, no rebound  Extremities: No peripheral edema  Vascular: 2+ peripheral pulses  Neurological: A/O x 3, no focal deficits  Musculoskeletal: 5/5 strength b/l upper and lower extremities  Skin: No rashes    MEDICATIONS:  MEDICATIONS  (STANDING):  HYDROmorphone Infusion 0.2 mG/Hr (1 mL/Hr) IV Continuous <Continuous>  LORazepam   Injectable 0.5 milliGRAM(s) IV Push every 6 hours  pantoprazole  Injectable 40 milliGRAM(s) IV Push two times a day  sodium chloride 0.9%. 1000 milliLiter(s) (15 mL/Hr) IV Continuous <Continuous>      LABS: All Labs Reviewed:                Blood Culture:     RADIOLOGY/EKG:    DVT PPX:    ADVANCED DIRECTIVE:    DISPOSITION:

## 2018-12-10 NOTE — PROGRESS NOTE ADULT - SUBJECTIVE AND OBJECTIVE BOX
Pt has been seen and examined with FP resident, resident supervised agree with a/p       HPI- pt admitted for gi bleed       Physical examination -  general- comfortable, lethargic   -rs-poor air entry   -cvs-s1s2 normal     A/P    #d/c today to inpatient hospice for comfort measure    #time spent 60 minutes

## 2018-12-12 DIAGNOSIS — E87.0 HYPEROSMOLALITY AND HYPERNATREMIA: ICD-10-CM

## 2018-12-12 DIAGNOSIS — E87.6 HYPOKALEMIA: ICD-10-CM

## 2018-12-12 DIAGNOSIS — E78.5 HYPERLIPIDEMIA, UNSPECIFIED: ICD-10-CM

## 2018-12-12 DIAGNOSIS — C61 MALIGNANT NEOPLASM OF PROSTATE: ICD-10-CM

## 2018-12-12 DIAGNOSIS — Z66 DO NOT RESUSCITATE: ICD-10-CM

## 2018-12-12 DIAGNOSIS — E43 UNSPECIFIED SEVERE PROTEIN-CALORIE MALNUTRITION: ICD-10-CM

## 2018-12-12 DIAGNOSIS — Z51.5 ENCOUNTER FOR PALLIATIVE CARE: ICD-10-CM

## 2018-12-12 DIAGNOSIS — K21.9 GASTRO-ESOPHAGEAL REFLUX DISEASE WITHOUT ESOPHAGITIS: ICD-10-CM

## 2018-12-12 DIAGNOSIS — N17.9 ACUTE KIDNEY FAILURE, UNSPECIFIED: ICD-10-CM

## 2018-12-12 DIAGNOSIS — C78.6 SECONDARY MALIGNANT NEOPLASM OF RETROPERITONEUM AND PERITONEUM: ICD-10-CM

## 2018-12-12 DIAGNOSIS — K92.0 HEMATEMESIS: ICD-10-CM

## 2018-12-12 DIAGNOSIS — E88.09 OTHER DISORDERS OF PLASMA-PROTEIN METABOLISM, NOT ELSEWHERE CLASSIFIED: ICD-10-CM

## 2018-12-12 DIAGNOSIS — E83.51 HYPOCALCEMIA: ICD-10-CM

## 2018-12-12 DIAGNOSIS — K92.2 GASTROINTESTINAL HEMORRHAGE, UNSPECIFIED: ICD-10-CM

## 2018-12-12 DIAGNOSIS — Z88.2 ALLERGY STATUS TO SULFONAMIDES: ICD-10-CM

## 2018-12-12 DIAGNOSIS — N31.9 NEUROMUSCULAR DYSFUNCTION OF BLADDER, UNSPECIFIED: ICD-10-CM

## 2018-12-12 DIAGNOSIS — R09.02 HYPOXEMIA: ICD-10-CM

## 2018-12-12 DIAGNOSIS — E86.0 DEHYDRATION: ICD-10-CM

## 2018-12-12 DIAGNOSIS — I10 ESSENTIAL (PRIMARY) HYPERTENSION: ICD-10-CM

## 2018-12-12 DIAGNOSIS — Z88.5 ALLERGY STATUS TO NARCOTIC AGENT: ICD-10-CM

## 2018-12-12 DIAGNOSIS — R74.8 ABNORMAL LEVELS OF OTHER SERUM ENZYMES: ICD-10-CM

## 2018-12-12 DIAGNOSIS — I71.4 ABDOMINAL AORTIC ANEURYSM, WITHOUT RUPTURE: ICD-10-CM

## 2018-12-12 DIAGNOSIS — I25.10 ATHEROSCLEROTIC HEART DISEASE OF NATIVE CORONARY ARTERY WITHOUT ANGINA PECTORIS: ICD-10-CM

## 2018-12-12 DIAGNOSIS — I95.9 HYPOTENSION, UNSPECIFIED: ICD-10-CM

## 2018-12-12 DIAGNOSIS — E87.8 OTHER DISORDERS OF ELECTROLYTE AND FLUID BALANCE, NOT ELSEWHERE CLASSIFIED: ICD-10-CM

## 2019-04-17 NOTE — PATIENT PROFILE ADULT. - FUNCTIONAL LEVEL PRIOR: TOILETING
Lucy Varner is a 76 year old female presenting for   Chief Complaint   Patient presents with   • Neurologic Problem     benign essential tremor     Denies Latex allergy or sensitivity.    Medication verified, no changes  Refills needed today: No    Pt is here for a consultation from Dr. Kwan for benign essential tremor.     Pt presents alone.     Pt had CT Head WO on 01/24/19. Patient states that tremor is intermittent and usually occurs in the morning. Patient states often spills drinks or requires 2 hands.        (0) independent

## 2019-09-09 NOTE — ED ADULT NURSE NOTE - PAIN: BODY LOCATION
Attempted to reach patient re: no show, NS letter returned as undeliverable.    No answer/no voicemail available.  
"across chest"

## 2019-12-24 NOTE — ED ADULT NURSE NOTE - CHIEF COMPLAINT QUOTE
Call placed to Imelda to let her know that order is placed.    Imelda states that Fanta is not urinating well and that they may take he run somewhere for testing and that Fanta may have UTI but they are also questioning if she is dehydrated.     Imelda is on the way to the house to see how Fanta is doing and will make a better determination after she is able to see her.    pt with history of prostate cancer was told to come back to ED "because my counts were high."

## 2020-02-05 NOTE — PHYSICAL THERAPY INITIAL EVALUATION ADULT - ASSISTIVE DEVICE FOR TRANSFER: SIT/STAND, REHAB EVAL
Anesthetic History   No history of anesthetic complications            Review of Systems / Medical History  Patient summary reviewed, nursing notes reviewed and pertinent labs reviewed    Pulmonary  Within defined limits                 Neuro/Psych   Within defined limits           Cardiovascular    Hypertension          Hyperlipidemia    Exercise tolerance: >4 METS     GI/Hepatic/Renal     GERD: well controlled    Renal disease    Pertinent negatives: No liver disease   Endo/Other        Morbid obesity and arthritis     Other Findings              Physical Exam    Airway  Mallampati: II    Neck ROM: normal range of motion   Mouth opening: Normal     Cardiovascular  Regular rate and rhythm,  S1 and S2 normal,  no murmur, click, rub, or gallop             Dental    Dentition: Full upper dentures and Lower partial plate     Pulmonary                 Abdominal  GI exam deferred       Other Findings            Anesthetic Plan    ASA: 3  Anesthesia type: MAC          Induction: Intravenous  Anesthetic plan and risks discussed with: Patient
JOSIAH

## 2020-02-07 NOTE — PATIENT PROFILE ADULT. - HEALTHCARE INFORMATION NEEDED, PROFILE
Jaiden Aceves is a 40year old male presenting with left ear pain. Symptoms began yesterday. Pt denies fever. RX/OTC Treatments used:  Advil    Pharmacy verified  PCP Verified  Medications Verified none

## 2020-04-08 NOTE — INPATIENT CERTIFICATION FOR MEDICARE PATIENTS - RISKS OF ADVERSE EVENTS
Concern for delay in diagnosis and treatment Patient requests all Lab and Radiology Results on their Discharge Instructions

## 2021-02-17 NOTE — ED ADULT NURSE NOTE - CAS EDP DISCH DISPOSITION ADMI
Chief Complaint: _Continues to complain of some cough has productive yellow sputum    Subjective: _Says that he feels a little better denies any nausea or vomiting does have palpitations on and off      Objective: _    Vitals: _  Vital Signs (last 24 hrs)_____ Last Charted___________Minimum____________ Maximum____________  Temp    98.3  (FEB 09 08:05) L 97.6 (FEB 08 15:52) 98.3  (FEB 08 20:23)  Heart Rate   70  (FEB 09 08:05) L 54 (FEB 08 12:22) 72  (FEB 08 20:23)  Resp Rate       20  (FEB 09 08:05) 16  (FEB 08 15:52) 20  (FEB 08 12:22)  SBP    109  (FEB 09 08:05) 109  (FEB 09 08:05) 131  (FEB 08 15:52)  DBP    77  (FEB 09 08:05) L 56 (FEB 08 20:23) 78  (FEB 08 15:52)    ENT: _Pupils equal and reactive extraocular movements are intact  Neck: _Supple  Respiratory: _Bilaterally prolonged expiration wheezing  CV: _S1-S2 tachycardia  GI: _Soft nontender normal bowel sounds  Musculoskeletal: _Grossly intact  Mental Status:_Alert and oriented x3    Imaging: _  Labs: _  Labs (Last four charted values)  WBC                  6.4 (FEB 04)   Hgb                  L 9.2 (FEB 04)   Hct                  L 31 (FEB 04)   Plt                  254 (FEB 04)   Na                   138 (FEB 05) 138 (FEB 04)   K                    4.6 (FEB 05) L 3.4 (FEB 04)   CO2                  22 (FEB 05) 24 (FEB 04)   Cl                   107 (FEB 05) 105 (FEB 04)   Cr                   0.94 (FEB 05) 1.15 (FEB 04)   BUN                  8 (FEB 05) 10 (FEB 04)   Glucose              88 (FEB 05) H 110 (FEB 04)   Mg                   1.8 (FEB 05)   Phos                 2.7 (FEB 05)   Ca                   8.9 (FEB 05) 9.1 (FEB 04)   PT                   10.8 (FEB 04)   INR                  1.1 (FEB 04)   PTT                  27 (FEB 04)   Troponin             0.01 (FEB 04) 0.02 (FEB 04)     Assessment: _    Plan: _1 persistent atrial fibrillation failed cardioversion x2 patient is now on amiodarone and Cardizem apparently on his telemetry  monitor seems to be having a heart rate between 101 10 still we will wait for cardiology to evaluate and decide if they would like to adjust his medications.  2 cough with possibility of mild bronchitis in view of his drug interactions we will go ahead and put him on p.o. doxycycline he is not hypoxic and hence I have had a long discussion with him that he can continue the antibiotic if he continues to have persistent cough he may require outpatient follow-up with Dr. Díaz with a repeat CAT scan or chest x-ray.  3 hypertension well controlled with medications.  For anemia of chronic disease.  5 DVT and GI prophylaxis.  If cleared by cardiology the patient may be able to get discharged will have to have a close follow-up for his chronic cough           Electronically Signed On 02.09.2019 09:12  ___________________________________________________   Kyra French MD     [facility]

## 2021-02-25 NOTE — DISCHARGE NOTE ADULT - NSTOBACCOREFERRAL_GEN_A_CS
decreased balance during turns/losing balance/decreased weight-shifting ability Patient declined information

## 2021-05-12 NOTE — DISCHARGE NOTE ADULT - VISION (WITH CORRECTIVE LENSES IF THE PATIENT USUALLY WEARS THEM):
Partially impaired: cannot see medication labels or newsprint, but can see obstacles in path, and the surrounding layout; can count fingers at arm's length
Yes - the patient is able to be screened

## 2021-06-01 NOTE — DISCHARGE NOTE ADULT - NSFTFSERV2RD_GEN_ALL_CORE
70F former smoker, with PMH HTN, VINCE, pulmonary fibrosis, HFpEF, ischemic heart disease, recent admissions for COVID-19 and sepsis 2/2 bacterial pneumonia, dc on home O2 presents requesting new oxygen machine as hers broke this morning.  Pt reports connection for tubing broke off the machine and she has difficulty breathing without O2.  Felt anxious when machine broke.  Denies f/c, headache, dizziness, fainting, chest pain, cough, uri sxs, abd pain, nvd, trauma
Nursing/Physical Therapy

## 2022-02-17 NOTE — ED ADULT TRIAGE NOTE - WEIGHT IN LBS
130.9
BIBA- from home weakness since this morning, N/VX4  missed Dialysis T/Th/Sat Left FA AV/fistula  Ambulatory on scene, walks with a cane  EMS  HX HTN, ESRD, PM on eliquis

## 2023-09-29 NOTE — PATIENT PROFILE ADULT. - CENTRAL VENOUS CATHETER
Called [facility] department, as of now there is nothing available the first week of October    no unable to assess immunization status...

## 2023-10-11 NOTE — ED ADULT NURSE REASSESSMENT NOTE - NS ED NURSE REASSESS COMMENT FT1
Pt was having hypotension w/ bp 80's/60's. Dr. Mc made aware and ordered for 1 Liter of 0.9 NS to be administered. BP improved to 90/60's and 100's/60's. Pt asymptomatic. No complaints or distress noted. Will continue to monitor bp and pt. No

## 2023-11-03 NOTE — PATIENT PROFILE ADULT. - FUNCTIONAL SCREEN CURRENT LEVEL: AMBULATION, MLM
Rx Refill Note  Requested Prescriptions     Pending Prescriptions Disp Refills    amiodarone (PACERONE) 200 MG tablet [Pharmacy Med Name: AMIODARONE  MG TABLET] 90 tablet 3     Sig: TAKE 1 TABLET BY MOUTH EVERY DAY    KLOR-CON 20 MEQ CR tablet [Pharmacy Med Name: KLOR-CON M20 TABLET] 90 tablet 3     Sig: TAKE 1 TABLET BY MOUTH EVERY DAY      Last office visit with prescribing clinician: 9/7/2023   Last telemedicine visit with prescribing clinician: Visit date not found   Next office visit with prescribing clinician: 3/13/2024                         Would you like a call back once the refill request has been completed: [] Yes [] No    If the office needs to give you a call back, can they leave a voicemail: [] Yes [] No    Arsen Alberts MA  11/03/23, 11:05 EDT  
(1) assistive equipment

## 2024-01-09 NOTE — ED ADULT NURSE NOTE - NSFALLRSKPASTHIST_ED_ALL_ED
Hi! He LOVES you and the help you provided. I know you just graduated him, and he is going to try things on his own, but was going to put a new order in to see if the PT can continue or if you could start him up again soon? Thank you! yes

## 2024-01-31 NOTE — H&P ADULT - NEGATIVE ENMT SYMPTOMS
January 31, 2024      [facility]  [address], [address]  [place] [address]  Phone: [phone]  Fax: [phone]       Patient: Barrie Ferguson   YOB: 1984  Date of Visit: 01/31/2024    To Whom It May Concern:    Jose Ferguson  was at [facility] on 01/31/2024. The patient may return to work/school on 02/05/2024 as long as he is fever free and symptoms improve with no restrictions. If you have any questions or concerns, or if I can be of further assistance, please do not hesitate to contact me.    Sincerely,    Carola Way PA-C     
no hearing difficulty/no ear pain

## 2024-02-28 NOTE — DISCHARGE NOTE ADULT - FUNCTIONAL SCREEN CURRENT LEVEL: DRESSING, MLM
(0) independent
Tylenol for minor pain as directed/Other pain medication as prescribed and directed/If you have episitomy or tear repair, use warm water sitz bath for relief of discomfort/If you have episitomy or tear repair, use anesthetic spray or cream as directed by your healthcare provider for relief of discomfort

## 2024-04-15 NOTE — PATIENT PROFILE ADULT - PRIMARY ROLES/RESPONSIBILITIES
71 y.o. Female presents to ED per MD Marciano Bustos for abnormal lab work (elevated liver enzymes) performed this past Friday 4/12/24. Referred to get outpatient lab work done after patient has been having intermittent vomiting x10 days associated with generalized weakness. pt states she had normal blood work x1 year ago. Pt denies CP, SOB, fevers/chills, current nausea or vomiting, dizziness, syncope. On assessment RUQ ttp and pt endorses intermittent epigastric discomfort x1 year. A/Ox4, ambulatory at baseline, unlabored even respirations no acute distress. pt changed into gown. none

## 2024-08-27 NOTE — ED ADULT NURSE NOTE - NSFALLRSKOUTCOME_ED_ALL_ED
[History reviewed] : History reviewed. [Medications and Allergies reviewed] : Medications and allergies reviewed. Fall with Harm Risk

## 2024-10-17 NOTE — ED ADULT NURSE NOTE - PAIN: PRESENCE, MLM
Patient called and needed to know if he can get an order for some labs so he can see if everything is ok.   
complains of pain/discomfort

## 2025-01-28 NOTE — ED PROVIDER NOTE - DATE/TIME 2
Head, normocephalic, atraumatic, Face, Face within normal limits, Ears, External ears within normal limits, Nose/Nasopharynx, External nose normal appearance, nares patent, no nasal discharge, Mouth and Throat, Oral cavity appearance normal, Lips, Appearance normal 05-Dec-2018 13:16

## 2025-05-09 NOTE — PROGRESS NOTE ADULT - SUBJECTIVE AND OBJECTIVE BOX
Date of service: 18 @ 14:08    Lying in bed in NAD  Weak looking  Poorly verbal  No fever or chills    ROS: unobtainable due to confusion    MEDICATIONS  (STANDING):  aspirin  chewable 81 milliGRAM(s) Oral daily  heparin  Injectable 5000 Unit(s) SubCutaneous every 12 hours  meropenem  IVPB 1000 milliGRAM(s) IV Intermittent every 12 hours  phenylephrine    Infusion 0.5 MICROgram(s)/kG/Min (12.581 mL/Hr) IV Continuous <Continuous>  sodium chloride 0.45%. 1000 milliLiter(s) (100 mL/Hr) IV Continuous <Continuous>  sodium chloride 0.9% Bolus 2000 milliLiter(s) IV Bolus once  sodium chloride 0.9% Bolus 2000 milliLiter(s) IV Bolus once    MEDICATIONS  (PRN):  ondansetron Injectable 4 milliGRAM(s) IV Push every 6 hours PRN Nausea and/or Vomiting  oxyCODONE    5 mG/acetaminophen 325 mG 1 Tablet(s) Oral every 6 hours PRN PAIN      Vital Signs Last 24 Hrs  T(C): 36.8 (2018 07:01), Max: 36.8 (2018 05:00)  T(F): 98.2 (2018 07:01), Max: 98.2 (2018 05:00)  HR: 75 (2018 13:00) (65 - 86)  BP: 109/65 (2018 13:00) (85/54 - 122/74)  BP(mean): 76 (2018 13:00) (60 - 86)  RR: 18 (2018 13:00) (14 - 30)  SpO2: 94% (2018 13:00) (92% - 98%)    Physical Exam:      Constitutional: frail looking  HEENT: NC/AT, EOMI, PERRLA, conjunctivae clear  Neck: supple; thyroid not palpable  Back: no tenderness  Respiratory: respiratory effort normal; clear to auscultation  Cardiovascular: S1S2 regular, no murmurs  Abdomen: soft, not tender, not distended, positive BS  Genitourinary: no suprapubic tenderness b/l nephrostomies in place  Lymphatic: no LN palpable  Musculoskeletal: no muscle tenderness, no joint swelling or tenderness  Extremities: no pedal edema  Neurological/ Psychiatric: confused; moving all extremities  Skin: no rashes; no palpable lesions    Labs: reviewed                        9.5    10.34 )-----------( 150      ( 2018 04:48 )             29.4     06-18    141  |  112<H>  |  16  ----------------------------<  93  3.2<L>   |  23  |  0.98    Ca    7.7<L>      2018 04:48  Phos  1.9     06-18  Mg     1.6     06-18    TPro  7.8  /  Alb  3.2<L>  /  TBili  0.9  /  DBili  x   /  AST  17  /  ALT  15  /  AlkPhos  116  0616                        10.4   17.51 )-----------( 183      ( 2018 06:54 )             33.3     06-17    147<H>  |  116<H>  |  19  ----------------------------<  106<H>  3.6   |  22  |  1.39<H>    Ca    7.6<L>      2018 06:54    TPro  7.8  /  Alb  3.2<L>  /  TBili  0.9  /  DBili  x   /  AST  17  /  ALT  15  /  AlkPhos  116  -16     LIVER FUNCTIONS - ( 2018 14:16 )  Alb: 3.2 g/dL / Pro: 7.8 gm/dL / ALK PHOS: 116 U/L / ALT: 15 U/L / AST: 17 U/L / GGT: x           Urinalysis Basic - ( 2018 14:45 )    Color: Yellow / Appearance: very cloudy / S.020 / pH: x  Gluc: x / Ketone: Negative  / Bili: Negative / Urobili: 1 mg/dL   Blood: x / Protein: 100 mg/dL / Nitrite: Positive   Leuk Esterase: Moderate / RBC: 25-50 /HPF / WBC >50   Sq Epi: x / Non Sq Epi: Many / Bacteria: Many      Culture - Urine (collected 2018 17:44)  Source: .Urine RIGHT KIDNEY CULTURE  Preliminary Report (2018 18:31):    Few Enterococcus faecalis    Moderate Escherichia coli    Culture - Urine (collected 2018 14:45)  Source: .Urine None  Preliminary Report (2018 18:12):    50,000 - 99,000 CFU/mL Enterococcus faecalis    >100,000 CFU/ml Escherichia coli    Culture - Blood (collected 2018 14:16)  Source: .Blood None  Gram Stain (2018 08:05):    Growth in anaerobic bottle: Gram Negative Rods  Preliminary Report (2018 08:15):    Growth in anaerobic bottle: Escherichia coli  Organism: Blood Culture PCR (2018 09:25)      -  Escherichia coli: Detec      Method Type: PCR    Culture - Blood (collected 2018 14:16)  Source: .Blood None  Preliminary Report (2018 19:01):    No growth to date.        Radiology: all available radiological tests reviewed  < from: CT Abdomen and Pelvis No Cont (18 @ 16:04) >    EXAM:  CT ABDOMEN AND PELVIS                            PROCEDURE DATE:  2018          INTERPRETATION:  CLINICAL STATEMENT: nephrostomy tube    TECHNIQUE: CT of the abdomen and pelvis was performed in the axial plane   utilizing thin slices without IV or oral contrast. Sagittal and coronal   reformatting was performed.    COMPARISON: 2017    FINDINGS:    The lower chest demonstrates no change in right middle lobe 4 mm nodule   seen since 2016. Lack of interval change suggests benign etiology.   The visualized descending aorta is again aneurysmal. There is coronary   artery calcification.    The evaluation of the abdominal viscera and the bowel is limited without   contrast.    The liver demonstrates a small cyst in the peripheral right lobe. There   is air in intrahepatic biliary tree. The gallbladder is not identified   and is likely surgically absent..    The spleen, pancreas and adrenal glands are grossly unremarkable.    A left    Breast tube is again noted without evidence of left hydronephrosis. There   is severe dilatation of the right renal pelvis and intrarenal collecting   system new from the prior study without evidence of ureteral dilatation.   There are multiple nonobstructive right renal calculi.. The bladder is   thick-walled which may be due to outlet obstruction. The prostate gland   is markedly enlarged. Bladder calculi are noted    There is no bowel obstruction.  There is no intraperitoneal free air.    There is no free fluid. There is a largeamount of stool in the colon.   Please correlate clinically for constipation    There is no abdominal or pelvic lymphadenopathy.  The pelvic structures   are grossly unremarkable. Surgical clips are again noted in the lower   abdomen.    The aorta isnot aneurysmal. There is pelvic lymphadenopathy in the left   common iliac and external iliac locations although evaluation is limited   without IV contrast. Enlarged left external iliac lymph node is seen on   axial image 105 although it appears smaller than on the previous   examination.    The bony structures demonstrate degenerative changes. There is a left   total hip replacement. There is levoscoliosis of the lumbar spine. There   is mild loss of height involving the T12 vertebral body which is a change   from the prior study.    IMPRESSION: Compression fracture of T12 is new from the previous study.  Redemonstration of aneurysmal dilatation of the ascending aorta.  Nonobstructive right renal calculi and bladder calculi  Enlarged prostate  Thick-walled bladder may be due to outlet obstruction  Severe dilatation of the right pelvis and renal calyces new from the   prior study may represent UPJ obstruction  Left pelvic adenopathy is seen although evaluation is limited by streak   artifact from the hip replacement and without IV contrast.    < end of copied text >    Advanced directives addressed: full resuscitation
Events Overnight" off pressors for 24 hours    HPI:  8 year old male with hx. prostate ca, s/p previous nephrostomy tubes, who had right nephrostomy fall out last night  He went to [facility] with fever to 102 and was sent to ED. In ed febrile, borderline bp, ct showed right hydronephrosis.  Patient went to IR  and had placement of Nephrostomy tube.  Urine culture enterococcus, esbl  off pressors  Day 4 Meropenem      ROS -        slight pain around nephrostomy tube, right nephrostomy tube slighly bloody        no chest pain, cough sob, abdominal pain, no extremity pain, no fever , chills ,           MEDICATIONS  (STANDING):  aspirin  chewable 81 milliGRAM(s) Oral daily  heparin  Injectable 5000 Unit(s) SubCutaneous every 12 hours  meropenem  IVPB 1000 milliGRAM(s) IV Intermittent every 8 hours  oxybutynin 5 milliGRAM(s) Oral daily  sodium chloride 0.9% Bolus 2000 milliLiter(s) IV Bolus once  sodium chloride 0.9% Bolus 2000 milliLiter(s) IV Bolus once    MEDICATIONS  (PRN):  ondansetron Injectable 4 milliGRAM(s) IV Push every 6 hours PRN Nausea and/or Vomiting  oxyCODONE    5 mG/acetaminophen 325 mG 1 Tablet(s) Oral every 6 hours PRN PAIN      ICU Vital Signs Last 24 Hrs  T(C): 37 (21 Jun 2018 05:00), Max: 37 (21 Jun 2018 05:00)  T(F): 98.6 (21 Jun 2018 05:00), Max: 98.6 (21 Jun 2018 05:00)  HR: 68 (21 Jun 2018 07:00) (59 - 81)  BP: 122/62 (21 Jun 2018 07:00) (70/55 - 122/62)  BP(mean): 77 (21 Jun 2018 07:00) (45 - 86)  ABP: --  ABP(mean): --  RR: 19 (21 Jun 2018 07:00) (15 - 37)  SpO2: 87% (20 Jun 2018 15:00) (87% - 99%)          I&O's Summary    20 Jun 2018 07:01  -  21 Jun 2018 07:00  --------------------------------------------------------  IN: 920 mL / OUT: 2125 mL / NET: -1205 mL                              10.3   4.60  )-----------( 152      ( 21 Jun 2018 06:09 )             31.6       06-21    143  |  108  |  13  ----------------------------<  106<H>  3.3<L>   |  29  |  0.74    Ca    7.9<L>      21 Jun 2018 06:09  Phos  2.2     06-20  Mg     1.6     06-20        DVT Prophylaxis:   Heparin subq                                                              Advanced Directives: Full code       PE awake alert      general looks well comfortable      HEENT - nc/at     neck supple      lungs clear     cv rrr     abdomen soft     noephrostomy tube draining     extremities warm
No
78 year old male with hx. prostate ca, s/p previous nephrostomy tubes, who had right nephrostomy fall out last night  He went to [facility] with fever to 102 and was sent to ED. In ed febrile, borderline bp, ct showed right hydronephrosis.  Patient went to IR  and had placement of Nephrostomy tube.  In pacu patient with rigors, with temp to 101.   Responded to code sepsis for bp in 70s . patinet lactate had corrected, inc after procedure to 3.0  patient comfortable , c/o pain around nephrostomy  tube.     Above d/w Dr Marcos who initially evaluated and treated the patient post-procedure.    Admitted to CCU and started on pressors for support of MAP.    6/17: pt on phenylephrine infusion to support MAP - comfortable.  nephrostomy with clear yellow urine.  AA and Interactive - NAD.  Gram (-) rods in blood cultures.    6/18:  PCR of Gram (-) in blood culture revealed E.coli - Urine with E.coli and E. faecalis.  Remains on low dose phenylephrine infusion (0.8mcg/kg/min) to support MAP.    Tolerating diet.  Nephrostomy draining appropriately.      6/19: pt pulled out his LEFT nephrostomy tube overnight - seen and examined at 8am and discussed with patient and then with Dr Enriquez from IR - LEFT nephrostomy replaced under local and pt tolerated without issue.  Discussed issues and plans with him and all questions answered.  Pt remains on low dose phenylephrine to support MAP.  Otherwise pt without complaints.  Allergies    morphine (Vomiting)  sulfa drugs (Other)        ICU Vital Signs Last 24 Hrs  T(C): 36.4 (19 Jun 2018 07:50), Max: 36.8 (18 Jun 2018 15:52)  T(F): 97.5 (19 Jun 2018 07:50), Max: 98.2 (18 Jun 2018 15:52)  HR: 66 (19 Jun 2018 09:00) (57 - 75)  BP: 102/69 (19 Jun 2018 09:00) (83/49 - 131/64)  BP(mean): 77 (19 Jun 2018 09:00) (56 - 90)  ABP: --  ABP(mean): --  RR: 25 (19 Jun 2018 09:00) (17 - 28)  SpO2: 94% (19 Jun 2018 09:00) (94% - 99%)          I&O's Summary    18 Jun 2018 07:01  -  19 Jun 2018 07:00  --------------------------------------------------------  IN: 4261.1 mL / OUT: 4350 mL / NET: -88.9 mL                              9.9    6.64  )-----------( 171      ( 19 Jun 2018 05:23 )             30.6       06-19    144  |  111<H>  |  13  ----------------------------<  120<H>  3.6   |  27  |  0.83    Ca    7.7<L>      19 Jun 2018 05:23  Phos  1.9     06-19  Mg     1.6     06-19        MEDICATIONS  (STANDING):  aspirin  chewable 81 milliGRAM(s) Oral daily  heparin  Injectable 5000 Unit(s) SubCutaneous every 12 hours  meropenem  IVPB 1000 milliGRAM(s) IV Intermittent every 12 hours  phenylephrine    Infusion 0.5 MICROgram(s)/kG/Min (12.581 mL/Hr) IV Continuous <Continuous>  sodium chloride 0.45%. 1000 milliLiter(s) (100 mL/Hr) IV Continuous <Continuous>  sodium chloride 0.9% Bolus 2000 milliLiter(s) IV Bolus once  sodium chloride 0.9% Bolus 2000 milliLiter(s) IV Bolus once    MEDICATIONS  (PRN):  ondansetron Injectable 4 milliGRAM(s) IV Push every 6 hours PRN Nausea and/or Vomiting  oxyCODONE    5 mG/acetaminophen 325 mG 1 Tablet(s) Oral every 6 hours PRN PAIN        Review of Systems:   · Negative General Symptoms	no fever; no chills	  · Negative Skin Symptoms	no rash; no itching	  · Negative Ophthalmologic Symptoms	no diplopia; no photophobia	  · Negative ENMT Symptoms	no dysphagia	  · Negative Respiratory and Thorax Symptoms	no cough	  · Respiratory and Thorax Symptoms	dyspnea	  · Negative Cardiovascular Symptoms	no chest pain; no palpitations	  · Negative Gastrointestinal Symptoms	no nausea; no vomiting; no diarrhea	  · General Genitourinary Symptoms	bladder infections; (+) urinary retention and self catheterizes	  · Negative Musculoskeletal Symptoms	no muscle weakness	  · Negative Neurological Symptoms	no headache; no loss of consciousness	  · Negative Psychiatric Symptoms	no suicidal ideation; no depression; no anxiety	  · Negative Hematology Symptoms	no gum bleeding; no nose bleeding	  · Negative Allergic Reactions	no urticaria	  · Additional ROS	ALL other ROS are NEGATIVE.	    Physical Exam:   · Constitutional	detailed exam	  · Constitutional Details	no distress	  · Eyes	detailed exam	  · Eyes Details	PERRL; EOMI	  · ENMT	detailed exam	  · ENMT Details	mouth	  · Mouth	moist	  · Neck	detailed exam	  · Neck Details	supple; no JVD	  · Back	detailed exam	  · Back Details	normal shape	  · Respiratory	detailed exam	  · Cardiovascular	detailed exam	  · Cardiovascular Details	regular rate and rhythm	  · Gastrointestinal	detailed exam	  · GI Normal	soft; nontender; no distention; bowel sounds normal	  · Extremities	detailed exam	  · Extremities Details	no cyanosis	  · Vascular	Equal and normal pulses (carotid, femoral, dorsalis pedis)	  · Neurological	detailed exam	  · Neurological Details	alert and oriented x 3	  · Motor	PARSONS spont, no gross deficits	  · Sensory	No gross sensory deficits	  · Skin	detailed exam	  · Skin Details	warm and dry	  · Musculoskeletal	detailed exam	  · Musculoskeletal Details	normal strength	  · Psychiatric	detailed exam	  · Psychiatric Details	normal affect; normal behavior	            DVT Prophylaxis: SQ heparin, venodynes    Advanced Directives: FULL  Discussed with: patient    Visit Information: Critical care time 45 min.    ** Time is exclusive of billed procedures and/or teaching and/or routine family updates.
78 year old male with hx. prostate ca, s/p previous nephrostomy tubes, who had right nephrostomy fall out last night  He went to [facility] with fever to 102 and was sent to ED. In ed febrile, borderline bp, ct showed right hydronephrosis.  Patient went to IR  and had placement of Nephrostomy tube.  In pacu patient with rigors, with temp to 101.   Responded to code sepsis for bp in 70s . patinet lactate had corrected, inc after procedure to 3.0  patient comfortable , c/o pain around nephrostomy  tube.     Above d/w Dr Marcos who initially evaluated and treated the patient post-procedure.    Admitted to CCU and started on pressors for support of MAP.    : pt on phenylephrine infusion to support MAP - comfortable.  nephrostomy with clear yellow urine.  AA and Interactive - NAD.  Gram (-) rods in blood cultures.    :  PCR of Gram (-) in blood culture revealed E.coli - Urine with E.coli and E. faecalis.  Remains on low dose phenylephrine infusion (0.8mcg/kg/min) to support MAP.    Tolerating diet.  Nephrostomy draining appropriately.        Allergies    morphine (Vomiting)  sulfa drugs (Other)      ICU Vital Signs Last 24 Hrs  T(C): 36.8 (2018 07:01), Max: 36.8 (2018 05:00)  T(F): 98.2 (2018 07:01), Max: 98.2 (2018 05:00)  HR: 65 (2018 11:00) (65 - 86)  BP: 99/61 (2018 11:00) (79/46 - 122/74)  BP(mean): 71 (2018 11:00) (53 - 86)  ABP: --  ABP(mean): --  RR: 24 (2018 11:00) (14 - 30)  SpO2: 96% (2018 11:00) (92% - 99%)        I&O's Summary    2018 07:01  -  2018 07:00  --------------------------------------------------------  IN: 4771.2 mL / OUT: 3175 mL / NET: 1596.2 mL    2018 07:01  -  2018 11:52  --------------------------------------------------------  IN: 960.5 mL / OUT: 800 mL / NET: 160.5 mL                              9.5    10.34 )-----------( 150      ( 2018 04:48 )             29.4       06-18    141  |  112<H>  |  16  ----------------------------<  93  3.2<L>   |  23  |  0.98    Ca    7.7<L>      2018 04:48  Phos  1.9     -18  Mg     1.6     18    TPro  7.8  /  Alb  3.2<L>  /  TBili  0.9  /  DBili  x   /  AST  17  /  ALT  15  /  AlkPhos  116  06-16      CAPILLARY BLOOD GLUCOSE          LIVER FUNCTIONS - ( 2018 14:16 )  Alb: 3.2 g/dL / Pro: 7.8 gm/dL / ALK PHOS: 116 U/L / ALT: 15 U/L / AST: 17 U/L / GGT: x             PT/INR - ( 2018 14:16 )   PT: 18.2 sec;   INR: 1.67 ratio         PTT - ( 2018 14:16 )  PTT:33.2 sec        Urinalysis Basic - ( 2018 14:45 )    Color: Yellow / Appearance: very cloudy / S.020 / pH: x  Gluc: x / Ketone: Negative  / Bili: Negative / Urobili: 1 mg/dL   Blood: x / Protein: 100 mg/dL / Nitrite: Positive   Leuk Esterase: Moderate / RBC: 25-50 /HPF / WBC >50   Sq Epi: x / Non Sq Epi: Many / Bacteria: Many        MEDICATIONS  (STANDING):  aspirin  chewable 81 milliGRAM(s) Oral daily  heparin  Injectable 5000 Unit(s) SubCutaneous every 12 hours  meropenem  IVPB 1000 milliGRAM(s) IV Intermittent every 12 hours  phenylephrine    Infusion 0.5 MICROgram(s)/kG/Min (12.581 mL/Hr) IV Continuous <Continuous>  potassium phosphate IVPB 15 milliMole(s) IV Intermittent once  sodium chloride 0.45%. 1000 milliLiter(s) (100 mL/Hr) IV Continuous <Continuous>  sodium chloride 0.9% Bolus 2000 milliLiter(s) IV Bolus once  sodium chloride 0.9% Bolus 2000 milliLiter(s) IV Bolus once    MEDICATIONS  (PRN):  ondansetron Injectable 4 milliGRAM(s) IV Push every 6 hours PRN Nausea and/or Vomiting  oxyCODONE    5 mG/acetaminophen 325 mG 1 Tablet(s) Oral every 6 hours PRN PAIN    Review of Systems:   · Negative General Symptoms	no fever; no chills	  · Negative Skin Symptoms	no rash; no itching	  · Negative Ophthalmologic Symptoms	no diplopia; no photophobia	  · Negative ENMT Symptoms	no dysphagia	  · Negative Respiratory and Thorax Symptoms	no cough	  · Respiratory and Thorax Symptoms	dyspnea	  · Negative Cardiovascular Symptoms	no chest pain; no palpitations	  · Negative Gastrointestinal Symptoms	no nausea; no vomiting; no diarrhea	  · General Genitourinary Symptoms	bladder infections; (+) urinary retention and self catheterizes	  · Negative Musculoskeletal Symptoms	no muscle weakness	  · Negative Neurological Symptoms	no headache; no loss of consciousness	  · Negative Psychiatric Symptoms	no suicidal ideation; no depression; no anxiety	  · Negative Hematology Symptoms	no gum bleeding; no nose bleeding	  · Negative Allergic Reactions	no urticaria	  · Additional ROS	ALL other ROS are NEGATIVE.	    Physical Exam:   · Constitutional	detailed exam	  · Constitutional Details	no distress	  · Eyes	detailed exam	  · Eyes Details	PERRL; EOMI	  · ENMT	detailed exam	  · ENMT Details	mouth	  · Mouth	moist	  · Neck	detailed exam	  · Neck Details	supple; no JVD	  · Back	detailed exam	  · Back Details	normal shape	  · Respiratory	detailed exam	  · Cardiovascular	detailed exam	  · Cardiovascular Details	regular rate and rhythm	  · Gastrointestinal	detailed exam	  · GI Normal	soft; nontender; no distention; bowel sounds normal	  · Extremities	detailed exam	  · Extremities Details	no cyanosis	  · Vascular	Equal and normal pulses (carotid, femoral, dorsalis pedis)	  · Neurological	detailed exam	  · Neurological Details	alert and oriented x 3	  · Motor	PARSONS spont, no gross deficits	  · Sensory	No gross sensory deficits	  · Skin	detailed exam	  · Skin Details	warm and dry	  · Musculoskeletal	detailed exam	  · Musculoskeletal Details	normal strength	  · Psychiatric	detailed exam	  · Psychiatric Details	normal affect; normal behavior	            DVT Prophylaxis: SQ heparin, venodynes    Advanced Directives: FULL  Discussed with: patient    Visit Information: Critical care time 45 min.    ** Time is exclusive of billed procedures and/or teaching and/or routine family updates.
Date of service: 18 @ 12:22    Lying in bed in NAD  Has slight pain around nephrostomy tube  Right nephrostomy tube slighly bloody  Weak looking    ROS: no fever or chills; denies dizziness, no HA, no SOB or cough, no abdominal pain, no diarrhea or constipation; no dysuria, no legs pain, no rashes    MEDICATIONS  (STANDING):  aspirin  chewable 81 milliGRAM(s) Oral daily  heparin  Injectable 5000 Unit(s) SubCutaneous every 12 hours  meropenem  IVPB 1000 milliGRAM(s) IV Intermittent every 8 hours  oxybutynin 5 milliGRAM(s) Oral daily  sodium chloride 0.9% Bolus 2000 milliLiter(s) IV Bolus once  sodium chloride 0.9% Bolus 2000 milliLiter(s) IV Bolus once    MEDICATIONS  (PRN):  ondansetron Injectable 4 milliGRAM(s) IV Push every 6 hours PRN Nausea and/or Vomiting  oxyCODONE    5 mG/acetaminophen 325 mG 1 Tablet(s) Oral every 6 hours PRN PAIN      Vital Signs Last 24 Hrs  T(C): 36.9 (2018 07:38), Max: 37.1 (2018 16:35)  T(F): 98.4 (2018 07:38), Max: 98.8 (2018 16:35)  HR: 65 (2018 21:30) (64 - 74)  BP: 113/72 (2018 08:30) (97/59 - 113/72)  BP(mean): 67 (2018 14:00) (67 - 67)  RR: 26 (2018 14:00) (21 - 26)  SpO2: 93% (2018 16:35) (93% - 93%)    Physical Exam:      Constitutional: frail looking  HEENT: NC/AT, EOMI, PERRLA, conjunctivae clear  Neck: supple; thyroid not palpable  Back: no tenderness  Respiratory: respiratory effort normal; clear to auscultation  Cardiovascular: S1S2 regular, no murmurs  Abdomen: soft, not tender, not distended, positive BS  Genitourinary: no suprapubic tenderness b/l nephrostomies in place  Lymphatic: no LN palpable  Musculoskeletal: no muscle tenderness, no joint swelling or tenderness  Extremities: no pedal edema  Neurological/ Psychiatric: confused; moving all extremities  Skin: no rashes; no palpable lesions    Labs: reviewed                        10.3   5.41  )-----------( 169      ( 2018 05:05 )             32.1     06-20    143  |  109<H>  |  12  ----------------------------<  96  3.5   |  28  |  0.72    Ca    8.3<L>      2018 05:05  Phos  2.2     06-20  Mg     1.6     06-20                                   10.4   17.51 )-----------( 183      ( 2018 06:54 )             33.3     06-17    147<H>  |  116<H>  |  19  ----------------------------<  106<H>  3.6   |  22  |  1.39<H>    Ca    7.6<L>      2018 06:54    TPro  7.8  /  Alb  3.2<L>  /  TBili  0.9  /  DBili  x   /  AST  17  /  ALT  15  /  AlkPhos  116  06-16     LIVER FUNCTIONS - ( 2018 14:16 )  Alb: 3.2 g/dL / Pro: 7.8 gm/dL / ALK PHOS: 116 U/L / ALT: 15 U/L / AST: 17 U/L / GGT: x           Urinalysis Basic - ( 2018 14:45 )    Color: Yellow / Appearance: very cloudy / S.020 / pH: x  Gluc: x / Ketone: Negative  / Bili: Negative / Urobili: 1 mg/dL   Blood: x / Protein: 100 mg/dL / Nitrite: Positive   Leuk Esterase: Moderate / RBC: 25-50 /HPF / WBC >50   Sq Epi: x / Non Sq Epi: Many / Bacteria: Many      Culture - Urine (collected 2018 17:44)  Source: .Urine RIGHT KIDNEY CULTURE  Final Report (2018 21:51):    Few Enterococcus faecalis    Moderate Escherichia coli ESBL  Organism: Enterococcus faecalis  Escherichia coli ESBL (2018 21:51)  Organism: Escherichia coli ESBL (2018 21:51)      -  Amikacin: S <=8      -  Amoxicillin/Clavulanic Acid: I 168      -  Ampicillin: R >16 These ampicillin results predict results for amoxicillin      -  Ampicillin/Sulbactam: R 16/8      -  Aztreonam: R <=4      -  Cefazolin: R >16 This predicts results for oral agents cefaclor, cefdinir, cefpodoxime, cefprozil, cefuroxime axetil, cephalexin and locarbef for uncomplicated UTI. Note that some isolates may be susceptible to these agents while testing resistant to cefazolin.      -  Cefepime: R 4      -  Cefoxitin: S <=4      -  Ceftriaxone: R >32 Enterobacter, Citrobacter, and Serratia may develop resistance during prolonged therapy      -  Ciprofloxacin: R >2      -  Ertapenem: S <=0.5      -  Gentamicin: S <=1      -  Imipenem: S <=1      -  Levofloxacin: R >4      -  Meropenem: S <=1      -  Nitrofurantoin: S <=32 Should not be used to treat pyelonephritis      -  Piperacillin/Tazobactam: R <=8      -  Tigecycline: S <=1      -  Tobramycin: R >8      -  Trimethoprim/Sulfamethoxazole: R >2/38      Method Type: JERAMY  Organism: Enterococcus faecalis (2018 21:51)      -  Ampicillin: S <=2 Predicts results to ampicillin/sulbactam, amoxacillin-clavulanate and  piperacillin-tazobactam.      -  Ciprofloxacin: R >2      -  Levofloxacin: R >4      -  Nitrofurantoin: S <=32 Should not be used to treat pyelonephritis.      -  Tetra/Doxy: R >8      -  Vancomycin: S 2      Method Type: JERAMY    Culture - Urine (collected 2018 14:45)  Source: .Urine None  Final Report (2018 21:16):    50,000 - 99,000 CFU/mL Enterococcus faecalis    >100,000 CFU/ml Escherichia coli  Organism: Enterococcus faecalis  Escherichia coli (2018 21:16)  Organism: Escherichia coli (2018 21:16)      -  Amikacin: S <=8      -  Amoxicillin/Clavulanic Acid: S <=8/4      -  Ampicillin: S 4 These ampicillin results predict results for amoxicillin      -  Ampicillin/Sulbactam: S <=4/2      -  Aztreonam: S <=4      -  Cefazolin: S <=2 This predicts results for oral agents cefaclor, cefdinir, cefpodoxime, cefprozil, cefuroxime axetil, cephalexin and locarbef for uncomplicated UTI. Note that some isolates may be susceptible to these agents while testing resistant to cefazolin.      -  Cefepime: S <=2      -  Cefoxitin: S <=4      -  Ceftriaxone: S <=1 Enterobacter, Citrobacter, and Serratia may develop resistance during prolonged therapy      -  Ciprofloxacin: S <=0.5      -  Ertapenem: S <=0.5      -  Gentamicin: S <=1      -  Imipenem: S <=1      -  Levofloxacin: S <=1      -  Meropenem: S <=1      -  Nitrofurantoin: S <=32 Should not be used to treat pyelonephritis      -  Piperacillin/Tazobactam: S <=8      -  Tigecycline: S <=1      -  Tobramycin: S <=2      -  Trimethoprim/Sulfamethoxazole: S <=0.5/9.5      Method Type: JERAMY  Organism: Enterococcus faecalis (2018 21:16)      -  Ampicillin: S <=2 Predicts results to ampicillin/sulbactam, amoxacillin-clavulanate and  piperacillin-tazobactam.      -  Ciprofloxacin: R >2      -  Levofloxacin: R >4      -  Nitrofurantoin: S <=32 Should not be used to treat pyelonephritis.      -  Tetra/Doxy: R >8      -  Vancomycin: S 4      Method Type: JERAMY    Culture - Blood (collected 2018 14:16)  Source: .Blood None  Gram Stain (2018 08:05):    Growth in anaerobic bottle: Gram Negative Rods  Final Report (2018 10:09):    Growth in anaerobic bottle: Escherichia coli ESBL  Organism: Blood Culture PCR  Escherichia coli ESBL (2018 10:09)  Organism: Escherichia coli ESBL (2018 10:09)      -  Amikacin: S 16      -  Ampicillin: R >16 These ampicillin results predict results for amoxicillin      -  Ampicillin/Sulbactam: R 16/8      -  Aztreonam: R 16      -  Cefazolin: R >16      -  Cefepime: R 4      -  Cefoxitin: S <=4      -  Ceftriaxone: R >32 Enterobacter, Citrobacter, and Serratia may develop resistance during prolonged therapy      -  Ciprofloxacin: R >2      -  Ertapenem: S <=0.5      -  Gentamicin: S <=1      -  Imipenem: S <=1      -  Levofloxacin: R >4      -  Meropenem: S <=1      -  Piperacillin/Tazobactam: R <=8      -  Tobramycin: R >8      -  Trimethoprim/Sulfamethoxazole: R >2/38      Method Type: JERAMY  Organism: Blood Culture PCR (2018 10:09)      -  Escherichia coli: Detec      Method Type: PCR    Culture - Blood (collected 2018 14:16)  Source: .Blood None  Preliminary Report (2018 19:01):    No growth to date.          Radiology: all available radiological tests reviewed  < from: CT Abdomen and Pelvis No Cont (18 @ 16:04) >    EXAM:  CT ABDOMEN AND PELVIS                            PROCEDURE DATE:  2018          INTERPRETATION:  CLINICAL STATEMENT: nephrostomy tube    IMPRESSION: Compression fracture of T12 is new from the previous study.  Redemonstration of aneurysmal dilatation of the ascending aorta.  Nonobstructive right renal calculi and bladder calculi  Enlarged prostate  Thick-walled bladder may be due to outlet obstruction  Severe dilatation of the right pelvis and renal calyces new from the   prior study may represent UPJ obstruction  Left pelvic adenopathy is seen although evaluation is limited by streak   artifact from the hip replacement and without IV contrast.    < end of copied text >    Advanced directives addressed: full resuscitation
Date of service: 18 @ 12:42    Lying in bed in NAD  Weak looking  Has dysuria    ROS: no fever or chills; denies dizziness, no HA, no SOB or cough, no abdominal pain, no diarrhea or constipation; no legs pain, no rashes    MEDICATIONS  (STANDING):  aspirin  chewable 81 milliGRAM(s) Oral daily  heparin  Injectable 5000 Unit(s) SubCutaneous every 12 hours  meropenem  IVPB 1000 milliGRAM(s) IV Intermittent every 12 hours  oxybutynin 5 milliGRAM(s) Oral daily  phenylephrine    Infusion 0.5 MICROgram(s)/kG/Min (12.581 mL/Hr) IV Continuous <Continuous>  sodium chloride 0.9% Bolus 2000 milliLiter(s) IV Bolus once  sodium chloride 0.9% Bolus 2000 milliLiter(s) IV Bolus once    MEDICATIONS  (PRN):  ondansetron Injectable 4 milliGRAM(s) IV Push every 6 hours PRN Nausea and/or Vomiting  oxyCODONE    5 mG/acetaminophen 325 mG 1 Tablet(s) Oral every 6 hours PRN PAIN      Vital Signs Last 24 Hrs  T(C): 36.6 (2018 07:28), Max: 37 (2018 05:00)  T(F): 97.8 (2018 07:28), Max: 98.6 (2018 05:00)  HR: 65 (2018 12:00) (51 - 78)  BP: 87/53 (2018 11:00) (82/39 - 121/55)  BP(mean): 61 (2018 11:00) (45 - 82)  RR: 25 (2018 12:00) (10 - 30)  SpO2: 95% (2018 12:00) (91% - 99%)    Physical Exam:      Constitutional: frail looking  HEENT: NC/AT, EOMI, PERRLA, conjunctivae clear  Neck: supple; thyroid not palpable  Back: no tenderness  Respiratory: respiratory effort normal; clear to auscultation  Cardiovascular: S1S2 regular, no murmurs  Abdomen: soft, not tender, not distended, positive BS  Genitourinary: no suprapubic tenderness b/l nephrostomies in place  Lymphatic: no LN palpable  Musculoskeletal: no muscle tenderness, no joint swelling or tenderness  Extremities: no pedal edema  Neurological/ Psychiatric: confused; moving all extremities  Skin: no rashes; no palpable lesions    Labs: reviewed                        10.3   5.41  )-----------( 169      ( 2018 05:05 )             32.1     06-20    143  |  109<H>  |  12  ----------------------------<  96  3.5   |  28  |  0.72    Ca    8.3<L>      2018 05:05  Phos  2.2     06-20  Mg     1.6     06-20                                   10.4   17.51 )-----------( 183      ( 2018 06:54 )             33.3     06-17    147<H>  |  116<H>  |  19  ----------------------------<  106<H>  3.6   |  22  |  1.39<H>    Ca    7.6<L>      2018 06:54    TPro  7.8  /  Alb  3.2<L>  /  TBili  0.9  /  DBili  x   /  AST  17  /  ALT  15  /  AlkPhos  116  06-16     LIVER FUNCTIONS - ( 2018 14:16 )  Alb: 3.2 g/dL / Pro: 7.8 gm/dL / ALK PHOS: 116 U/L / ALT: 15 U/L / AST: 17 U/L / GGT: x           Urinalysis Basic - ( 2018 14:45 )    Color: Yellow / Appearance: very cloudy / S.020 / pH: x  Gluc: x / Ketone: Negative  / Bili: Negative / Urobili: 1 mg/dL   Blood: x / Protein: 100 mg/dL / Nitrite: Positive   Leuk Esterase: Moderate / RBC: 25-50 /HPF / WBC >50   Sq Epi: x / Non Sq Epi: Many / Bacteria: Many      Culture - Urine (collected 2018 17:44)  Source: .Urine RIGHT KIDNEY CULTURE  Final Report (2018 21:51):    Few Enterococcus faecalis    Moderate Escherichia coli ESBL  Organism: Enterococcus faecalis  Escherichia coli ESBL (2018 21:51)  Organism: Escherichia coli ESBL (2018 21:51)      -  Amikacin: S <=8      -  Amoxicillin/Clavulanic Acid: I 16/8      -  Ampicillin: R >16 These ampicillin results predict results for amoxicillin      -  Ampicillin/Sulbactam: R 16/8      -  Aztreonam: R <=4      -  Cefazolin: R >16 This predicts results for oral agents cefaclor, cefdinir, cefpodoxime, cefprozil, cefuroxime axetil, cephalexin and locarbef for uncomplicated UTI. Note that some isolates may be susceptible to these agents while testing resistant to cefazolin.      -  Cefepime: R 4      -  Cefoxitin: S <=4      -  Ceftriaxone: R >32 Enterobacter, Citrobacter, and Serratia may develop resistance during prolonged therapy      -  Ciprofloxacin: R >2      -  Ertapenem: S <=0.5      -  Gentamicin: S <=1      -  Imipenem: S <=1      -  Levofloxacin: R >4      -  Meropenem: S <=1      -  Nitrofurantoin: S <=32 Should not be used to treat pyelonephritis      -  Piperacillin/Tazobactam: R <=8      -  Tigecycline: S <=1      -  Tobramycin: R >8      -  Trimethoprim/Sulfamethoxazole: R >2/38      Method Type: JERAMY  Organism: Enterococcus faecalis (2018 21:51)      -  Ampicillin: S <=2 Predicts results to ampicillin/sulbactam, amoxacillin-clavulanate and  piperacillin-tazobactam.      -  Ciprofloxacin: R >2      -  Levofloxacin: R >4      -  Nitrofurantoin: S <=32 Should not be used to treat pyelonephritis.      -  Tetra/Doxy: R >8      -  Vancomycin: S 2      Method Type: JERAMY    Culture - Urine (collected 2018 14:45)  Source: .Urine None  Final Report (2018 21:16):    50,000 - 99,000 CFU/mL Enterococcus faecalis    >100,000 CFU/ml Escherichia coli  Organism: Enterococcus faecalis  Escherichia coli (2018 21:16)  Organism: Escherichia coli (2018 21:16)      -  Amikacin: S <=8      -  Amoxicillin/Clavulanic Acid: S <=8/4      -  Ampicillin: S 4 These ampicillin results predict results for amoxicillin      -  Ampicillin/Sulbactam: S <=4/2      -  Aztreonam: S <=4      -  Cefazolin: S <=2 This predicts results for oral agents cefaclor, cefdinir, cefpodoxime, cefprozil, cefuroxime axetil, cephalexin and locarbef for uncomplicated UTI. Note that some isolates may be susceptible to these agents while testing resistant to cefazolin.      -  Cefepime: S <=2      -  Cefoxitin: S <=4      -  Ceftriaxone: S <=1 Enterobacter, Citrobacter, and Serratia may develop resistance during prolonged therapy      -  Ciprofloxacin: S <=0.5      -  Ertapenem: S <=0.5      -  Gentamicin: S <=1      -  Imipenem: S <=1      -  Levofloxacin: S <=1      -  Meropenem: S <=1      -  Nitrofurantoin: S <=32 Should not be used to treat pyelonephritis      -  Piperacillin/Tazobactam: S <=8      -  Tigecycline: S <=1      -  Tobramycin: S <=2      -  Trimethoprim/Sulfamethoxazole: S <=0.5/9.5      Method Type: JERAMY  Organism: Enterococcus faecalis (2018 21:16)      -  Ampicillin: S <=2 Predicts results to ampicillin/sulbactam, amoxacillin-clavulanate and  piperacillin-tazobactam.      -  Ciprofloxacin: R >2      -  Levofloxacin: R >4      -  Nitrofurantoin: S <=32 Should not be used to treat pyelonephritis.      -  Tetra/Doxy: R >8      -  Vancomycin: S 4      Method Type: JERAMY    Culture - Blood (collected 2018 14:16)  Source: .Blood None  Gram Stain (2018 08:05):    Growth in anaerobic bottle: Gram Negative Rods  Final Report (2018 10:09):    Growth in anaerobic bottle: Escherichia coli ESBL  Organism: Blood Culture PCR  Escherichia coli ESBL (2018 10:09)  Organism: Escherichia coli ESBL (2018 10:09)      -  Amikacin: S 16      -  Ampicillin: R >16 These ampicillin results predict results for amoxicillin      -  Ampicillin/Sulbactam: R 16/8      -  Aztreonam: R 16      -  Cefazolin: R >16      -  Cefepime: R 4      -  Cefoxitin: S <=4      -  Ceftriaxone: R >32 Enterobacter, Citrobacter, and Serratia may develop resistance during prolonged therapy      -  Ciprofloxacin: R >2      -  Ertapenem: S <=0.5      -  Gentamicin: S <=1      -  Imipenem: S <=1      -  Levofloxacin: R >4      -  Meropenem: S <=1      -  Piperacillin/Tazobactam: R <=8      -  Tobramycin: R >8      -  Trimethoprim/Sulfamethoxazole: R >2/38      Method Type: JERAMY  Organism: Blood Culture PCR (2018 10:09)      -  Escherichia coli: Detec      Method Type: PCR    Culture - Blood (collected 2018 14:16)  Source: .Blood None  Preliminary Report (2018 19:01):    No growth to date.          Radiology: all available radiological tests reviewed  < from: CT Abdomen and Pelvis No Cont (18 @ 16:04) >    EXAM:  CT ABDOMEN AND PELVIS                            PROCEDURE DATE:  2018          INTERPRETATION:  CLINICAL STATEMENT: nephrostomy tube    TECHNIQUE: CT of the abdomen and pelvis was performed in the axial plane   utilizing thin slices without IV or oral contrast. Sagittal and coronal   reformatting was performed.    COMPARISON: 2017    FINDINGS:    The lower chest demonstrates no change in right middle lobe 4 mm nodule   seen since 2016. Lack of interval change suggests benign etiology.   The visualized descending aorta is again aneurysmal. There is coronary   artery calcification.    The evaluation of the abdominal viscera and the bowel is limited without   contrast.    The liver demonstrates a small cyst in the peripheral right lobe. There   is air in intrahepatic biliary tree. The gallbladder is not identified   and is likely surgically absent..    The spleen, pancreas and adrenal glands are grossly unremarkable.    A left    Breast tube is again noted without evidence of left hydronephrosis. There   is severe dilatation of the right renal pelvis and intrarenal collecting   system new from the prior study without evidence of ureteral dilatation.   There are multiple nonobstructive right renal calculi.. The bladder is   thick-walled which may be due to outlet obstruction. The prostate gland   is markedly enlarged. Bladder calculi are noted    There is no bowel obstruction.  There is no intraperitoneal free air.    There is no free fluid. There is a largeamount of stool in the colon.   Please correlate clinically for constipation    There is no abdominal or pelvic lymphadenopathy.  The pelvic structures   are grossly unremarkable. Surgical clips are again noted in the lower   abdomen.    The aorta isnot aneurysmal. There is pelvic lymphadenopathy in the left   common iliac and external iliac locations although evaluation is limited   without IV contrast. Enlarged left external iliac lymph node is seen on   axial image 105 although it appears smaller than on the previous   examination.    The bony structures demonstrate degenerative changes. There is a left   total hip replacement. There is levoscoliosis of the lumbar spine. There   is mild loss of height involving the T12 vertebral body which is a change   from the prior study.    IMPRESSION: Compression fracture of T12 is new from the previous study.  Redemonstration of aneurysmal dilatation of the ascending aorta.  Nonobstructive right renal calculi and bladder calculi  Enlarged prostate  Thick-walled bladder may be due to outlet obstruction  Severe dilatation of the right pelvis and renal calyces new from the   prior study may represent UPJ obstruction  Left pelvic adenopathy is seen although evaluation is limited by streak   artifact from the hip replacement and without IV contrast.    < end of copied text >    Advanced directives addressed: full resuscitation
Date of service: 18 @ 13:21    Lying in bed in NAD  Weak looking  No new complaints    ROS: no fever or chills; denies dizziness, no HA, no SOB or cough, no abdominal pain, no diarrhea or constipation; no legs pain, no rashes    MEDICATIONS  (STANDING):  aspirin  chewable 81 milliGRAM(s) Oral daily  heparin  Injectable 5000 Unit(s) SubCutaneous every 12 hours  meropenem  IVPB 1000 milliGRAM(s) IV Intermittent every 12 hours  phenylephrine    Infusion 0.5 MICROgram(s)/kG/Min (12.581 mL/Hr) IV Continuous <Continuous>  sodium chloride 0.45%. 1000 milliLiter(s) (100 mL/Hr) IV Continuous <Continuous>  sodium chloride 0.9% Bolus 2000 milliLiter(s) IV Bolus once  sodium chloride 0.9% Bolus 2000 milliLiter(s) IV Bolus once    MEDICATIONS  (PRN):  ondansetron Injectable 4 milliGRAM(s) IV Push every 6 hours PRN Nausea and/or Vomiting  oxyCODONE    5 mG/acetaminophen 325 mG 1 Tablet(s) Oral every 6 hours PRN PAIN      Vital Signs Last 24 Hrs  T(C): 36.4 (2018 07:50), Max: 36.8 (2018 15:52)  T(F): 97.5 (2018 07:50), Max: 98.2 (2018 15:52)  HR: 66 (2018 09:00) (57 - 75)  BP: 102/69 (2018 09:00) (83/49 - 131/64)  BP(mean): 77 (2018 09:00) (56 - 90)  RR: 25 (2018 09:00) (17 - 28)  SpO2: 94% (2018 09:00) (94% - 99%)    Physical Exam:      Constitutional: frail looking  HEENT: NC/AT, EOMI, PERRLA, conjunctivae clear  Neck: supple; thyroid not palpable  Back: no tenderness  Respiratory: respiratory effort normal; clear to auscultation  Cardiovascular: S1S2 regular, no murmurs  Abdomen: soft, not tender, not distended, positive BS  Genitourinary: no suprapubic tenderness b/l nephrostomies in place  Lymphatic: no LN palpable  Musculoskeletal: no muscle tenderness, no joint swelling or tenderness  Extremities: no pedal edema  Neurological/ Psychiatric: confused; moving all extremities  Skin: no rashes; no palpable lesions    Labs: reviewed                        9.9    6.64  )-----------( 171      ( 2018 05:23 )             30.6     06-19    144  |  111<H>  |  13  ----------------------------<  120<H>  3.6   |  27  |  0.83    Ca    7.7<L>      2018 05:23  Phos  1.9     06-19  Mg     1.6     -19                                   10.4   17.51 )-----------( 183      ( 2018 06:54 )             33.3     06-17    147<H>  |  116<H>  |  19  ----------------------------<  106<H>  3.6   |  22  |  1.39<H>    Ca    7.6<L>      2018 06:54    TPro  7.8  /  Alb  3.2<L>  /  TBili  0.9  /  DBili  x   /  AST  17  /  ALT  15  /  AlkPhos  116  06-16     LIVER FUNCTIONS - ( 2018 14:16 )  Alb: 3.2 g/dL / Pro: 7.8 gm/dL / ALK PHOS: 116 U/L / ALT: 15 U/L / AST: 17 U/L / GGT: x           Urinalysis Basic - ( 2018 14:45 )    Color: Yellow / Appearance: very cloudy / S.020 / pH: x  Gluc: x / Ketone: Negative  / Bili: Negative / Urobili: 1 mg/dL   Blood: x / Protein: 100 mg/dL / Nitrite: Positive   Leuk Esterase: Moderate / RBC: 25-50 /HPF / WBC >50   Sq Epi: x / Non Sq Epi: Many / Bacteria: Many      Culture - Urine (collected 2018 17:44)  Source: .Urine RIGHT KIDNEY CULTURE  Preliminary Report (2018 18:31):    Few Enterococcus faecalis    Moderate Escherichia coli    Culture - Urine (collected 2018 14:45)  Source: .Urine None  Preliminary Report (2018 18:12):    50,000 - 99,000 CFU/mL Enterococcus faecalis    >100,000 CFU/ml Escherichia coli    Culture - Blood (collected 2018 14:16)  Source: .Blood None  Gram Stain (2018 08:05):    Growth in anaerobic bottle: Gram Negative Rods  Preliminary Report (2018 08:15):    Growth in anaerobic bottle: Escherichia coli  Organism: Blood Culture PCR (2018 09:25)      -  Escherichia coli: Detec      Method Type: PCR    Culture - Blood (collected 2018 14:16)  Source: .Blood None  Preliminary Report (2018 19:01):    No growth to date.        Radiology: all available radiological tests reviewed  < from: CT Abdomen and Pelvis No Cont (18 @ 16:04) >    EXAM:  CT ABDOMEN AND PELVIS                            PROCEDURE DATE:  2018          INTERPRETATION:  CLINICAL STATEMENT: nephrostomy tube    TECHNIQUE: CT of the abdomen and pelvis was performed in the axial plane   utilizing thin slices without IV or oral contrast. Sagittal and coronal   reformatting was performed.    COMPARISON: 2017    FINDINGS:    The lower chest demonstrates no change in right middle lobe 4 mm nodule   seen since 2016. Lack of interval change suggests benign etiology.   The visualized descending aorta is again aneurysmal. There is coronary   artery calcification.    The evaluation of the abdominal viscera and the bowel is limited without   contrast.    The liver demonstrates a small cyst in the peripheral right lobe. There   is air in intrahepatic biliary tree. The gallbladder is not identified   and is likely surgically absent..    The spleen, pancreas and adrenal glands are grossly unremarkable.    A left    Breast tube is again noted without evidence of left hydronephrosis. There   is severe dilatation of the right renal pelvis and intrarenal collecting   system new from the prior study without evidence of ureteral dilatation.   There are multiple nonobstructive right renal calculi.. The bladder is   thick-walled which may be due to outlet obstruction. The prostate gland   is markedly enlarged. Bladder calculi are noted    There is no bowel obstruction.  There is no intraperitoneal free air.    There is no free fluid. There is a largeamount of stool in the colon.   Please correlate clinically for constipation    There is no abdominal or pelvic lymphadenopathy.  The pelvic structures   are grossly unremarkable. Surgical clips are again noted in the lower   abdomen.    The aorta isnot aneurysmal. There is pelvic lymphadenopathy in the left   common iliac and external iliac locations although evaluation is limited   without IV contrast. Enlarged left external iliac lymph node is seen on   axial image 105 although it appears smaller than on the previous   examination.    The bony structures demonstrate degenerative changes. There is a left   total hip replacement. There is levoscoliosis of the lumbar spine. There   is mild loss of height involving the T12 vertebral body which is a change   from the prior study.    IMPRESSION: Compression fracture of T12 is new from the previous study.  Redemonstration of aneurysmal dilatation of the ascending aorta.  Nonobstructive right renal calculi and bladder calculi  Enlarged prostate  Thick-walled bladder may be due to outlet obstruction  Severe dilatation of the right pelvis and renal calyces new from the   prior study may represent UPJ obstruction  Left pelvic adenopathy is seen although evaluation is limited by streak   artifact from the hip replacement and without IV contrast.    < end of copied text >    Advanced directives addressed: full resuscitation
Date of service: 18 @ 14:01    OOB to chair  Denies pain  Feels weak; wants to go home    ROS: no fever or chills; denies dizziness, no HA, no SOB or cough, no abdominal pain, no diarrhea or constipation; no dysuria, no legs pain, no rashes    MEDICATIONS  (STANDING):  aspirin  chewable 81 milliGRAM(s) Oral daily  heparin  Injectable 5000 Unit(s) SubCutaneous every 12 hours  meropenem  IVPB 1000 milliGRAM(s) IV Intermittent every 8 hours  oxybutynin 5 milliGRAM(s) Oral daily  sodium chloride 0.9% Bolus 2000 milliLiter(s) IV Bolus once  sodium chloride 0.9% Bolus 2000 milliLiter(s) IV Bolus once    MEDICATIONS  (PRN):  ondansetron Injectable 4 milliGRAM(s) IV Push every 6 hours PRN Nausea and/or Vomiting  oxyCODONE    5 mG/acetaminophen 325 mG 1 Tablet(s) Oral every 6 hours PRN PAIN      Vital Signs Last 24 Hrs  T(C): 36.7 (2018 08:37), Max: 37 (2018 05:00)  T(F): 98.1 (2018 08:37), Max: 98.6 (2018 05:00)  HR: 69 (2018 12:00) (59 - 81)  BP: 98/50 (2018 12:00) (76/41 - 125/83)  BP(mean): 62 (2018 12:00) (50 - 93)  RR: 27 (2018 12:00) (15 - 32)  SpO2: 87% (2018 15:00) (87% - 87%)    Physical Exam:      Constitutional: frail looking  HEENT: NC/AT, EOMI, PERRLA, conjunctivae clear  Neck: supple; thyroid not palpable  Back: no tenderness  Respiratory: respiratory effort normal; clear to auscultation  Cardiovascular: S1S2 regular, no murmurs  Abdomen: soft, not tender, not distended, positive BS  Genitourinary: no suprapubic tenderness b/l nephrostomies in place  Lymphatic: no LN palpable  Musculoskeletal: no muscle tenderness, no joint swelling or tenderness  Extremities: no pedal edema  Neurological/ Psychiatric: confused; moving all extremities  Skin: no rashes; no palpable lesions    Labs: reviewed                        10.3   5.41  )-----------( 169      ( 2018 05:05 )             32.1     06-20    143  |  109<H>  |  12  ----------------------------<  96  3.5   |  28  |  0.72    Ca    8.3<L>      2018 05:05  Phos  2.2     06-20  Mg     1.6     06-20                                   10.4   17.51 )-----------( 183      ( 2018 06:54 )             33.3     06-17    147<H>  |  116<H>  |  19  ----------------------------<  106<H>  3.6   |  22  |  1.39<H>    Ca    7.6<L>      2018 06:54    TPro  7.8  /  Alb  3.2<L>  /  TBili  0.9  /  DBili  x   /  AST  17  /  ALT  15  /  AlkPhos  116  06-16     LIVER FUNCTIONS - ( 2018 14:16 )  Alb: 3.2 g/dL / Pro: 7.8 gm/dL / ALK PHOS: 116 U/L / ALT: 15 U/L / AST: 17 U/L / GGT: x           Urinalysis Basic - ( 2018 14:45 )    Color: Yellow / Appearance: very cloudy / S.020 / pH: x  Gluc: x / Ketone: Negative  / Bili: Negative / Urobili: 1 mg/dL   Blood: x / Protein: 100 mg/dL / Nitrite: Positive   Leuk Esterase: Moderate / RBC: 25-50 /HPF / WBC >50   Sq Epi: x / Non Sq Epi: Many / Bacteria: Many      Culture - Urine (collected 2018 17:44)  Source: .Urine RIGHT KIDNEY CULTURE  Final Report (2018 21:51):    Few Enterococcus faecalis    Moderate Escherichia coli ESBL  Organism: Enterococcus faecalis  Escherichia coli ESBL (2018 21:51)  Organism: Escherichia coli ESBL (2018 21:51)      -  Amikacin: S <=8      -  Amoxicillin/Clavulanic Acid: I 16/8      -  Ampicillin: R >16 These ampicillin results predict results for amoxicillin      -  Ampicillin/Sulbactam: R 16/8      -  Aztreonam: R <=4      -  Cefazolin: R >16 This predicts results for oral agents cefaclor, cefdinir, cefpodoxime, cefprozil, cefuroxime axetil, cephalexin and locarbef for uncomplicated UTI. Note that some isolates may be susceptible to these agents while testing resistant to cefazolin.      -  Cefepime: R 4      -  Cefoxitin: S <=4      -  Ceftriaxone: R >32 Enterobacter, Citrobacter, and Serratia may develop resistance during prolonged therapy      -  Ciprofloxacin: R >2      -  Ertapenem: S <=0.5      -  Gentamicin: S <=1      -  Imipenem: S <=1      -  Levofloxacin: R >4      -  Meropenem: S <=1      -  Nitrofurantoin: S <=32 Should not be used to treat pyelonephritis      -  Piperacillin/Tazobactam: R <=8      -  Tigecycline: S <=1      -  Tobramycin: R >8      -  Trimethoprim/Sulfamethoxazole: R >2/38      Method Type: JERAMY  Organism: Enterococcus faecalis (2018 21:51)      -  Ampicillin: S <=2 Predicts results to ampicillin/sulbactam, amoxacillin-clavulanate and  piperacillin-tazobactam.      -  Ciprofloxacin: R >2      -  Levofloxacin: R >4      -  Nitrofurantoin: S <=32 Should not be used to treat pyelonephritis.      -  Tetra/Doxy: R >8      -  Vancomycin: S 2      Method Type: JERAMY    Culture - Urine (collected 2018 14:45)  Source: .Urine None  Final Report (2018 21:16):    50,000 - 99,000 CFU/mL Enterococcus faecalis    >100,000 CFU/ml Escherichia coli  Organism: Enterococcus faecalis  Escherichia coli (2018 21:16)  Organism: Escherichia coli (2018 21:16)      -  Amikacin: S <=8      -  Amoxicillin/Clavulanic Acid: S <=8/4      -  Ampicillin: S 4 These ampicillin results predict results for amoxicillin      -  Ampicillin/Sulbactam: S <=4/2      -  Aztreonam: S <=4      -  Cefazolin: S <=2 This predicts results for oral agents cefaclor, cefdinir, cefpodoxime, cefprozil, cefuroxime axetil, cephalexin and locarbef for uncomplicated UTI. Note that some isolates may be susceptible to these agents while testing resistant to cefazolin.      -  Cefepime: S <=2      -  Cefoxitin: S <=4      -  Ceftriaxone: S <=1 Enterobacter, Citrobacter, and Serratia may develop resistance during prolonged therapy      -  Ciprofloxacin: S <=0.5      -  Ertapenem: S <=0.5      -  Gentamicin: S <=1      -  Imipenem: S <=1      -  Levofloxacin: S <=1      -  Meropenem: S <=1      -  Nitrofurantoin: S <=32 Should not be used to treat pyelonephritis      -  Piperacillin/Tazobactam: S <=8      -  Tigecycline: S <=1      -  Tobramycin: S <=2      -  Trimethoprim/Sulfamethoxazole: S <=0.5/9.5      Method Type: JERAMY  Organism: Enterococcus faecalis (2018 21:16)      -  Ampicillin: S <=2 Predicts results to ampicillin/sulbactam, amoxacillin-clavulanate and  piperacillin-tazobactam.      -  Ciprofloxacin: R >2      -  Levofloxacin: R >4      -  Nitrofurantoin: S <=32 Should not be used to treat pyelonephritis.      -  Tetra/Doxy: R >8      -  Vancomycin: S 4      Method Type: JERAMY    Culture - Blood (collected 2018 14:16)  Source: .Blood None  Gram Stain (2018 08:05):    Growth in anaerobic bottle: Gram Negative Rods  Final Report (2018 10:09):    Growth in anaerobic bottle: Escherichia coli ESBL  Organism: Blood Culture PCR  Escherichia coli ESBL (2018 10:09)  Organism: Escherichia coli ESBL (2018 10:09)      -  Amikacin: S 16      -  Ampicillin: R >16 These ampicillin results predict results for amoxicillin      -  Ampicillin/Sulbactam: R 16/8      -  Aztreonam: R 16      -  Cefazolin: R >16      -  Cefepime: R 4      -  Cefoxitin: S <=4      -  Ceftriaxone: R >32 Enterobacter, Citrobacter, and Serratia may develop resistance during prolonged therapy      -  Ciprofloxacin: R >2      -  Ertapenem: S <=0.5      -  Gentamicin: S <=1      -  Imipenem: S <=1      -  Levofloxacin: R >4      -  Meropenem: S <=1      -  Piperacillin/Tazobactam: R <=8      -  Tobramycin: R >8      -  Trimethoprim/Sulfamethoxazole: R >/38      Method Type: JERAMY  Organism: Blood Culture PCR (2018 10:09)      -  Escherichia coli: Detec      Method Type: PCR    Culture - Blood (collected 2018 14:16)  Source: .Blood None  Preliminary Report (2018 19:01):    No growth to date.          Radiology: all available radiological tests reviewed  < from: CT Abdomen and Pelvis No Cont (18 @ 16:04) >    EXAM:  CT ABDOMEN AND PELVIS                            PROCEDURE DATE:  2018          INTERPRETATION:  CLINICAL STATEMENT: nephrostomy tube    IMPRESSION: Compression fracture of T12 is new from the previous study.  Redemonstration of aneurysmal dilatation of the ascending aorta.  Nonobstructive right renal calculi and bladder calculi  Enlarged prostate  Thick-walled bladder may be due to outlet obstruction  Severe dilatation of the right pelvis and renal calyces new from the   prior study may represent UPJ obstruction  Left pelvic adenopathy is seen although evaluation is limited by streak   artifact from the hip replacement and without IV contrast.    < end of copied text >    Advanced directives addressed: full resuscitation
Events Overnight: comfortable , had left nephrostomy tube replace yesterday, on 0.3 mcg/kg/min    HPI:       78 year old male with hx. prostate ca, s/p previous nephrostomy tubes, who had right nephrostomy fall out last night  He went to [facility] with fever to 102 and was sent to ED. In ed febrile, borderline bp, ct showed right hydronephrosis.  Patient went to IR  and had placement of Nephrostomy tube.  Urine culture enterococcus, esbl  still on low dose phenylephrine, Day 4 Meropenem      ROS -        slight pain around nephrostomy tube        no chest pain, cough sob, abdominal pain, no extremity pain, no fever , chills , dizziness        ros otherwise negative    MEDICATIONS  (STANDING):  aspirin  chewable 81 milliGRAM(s) Oral daily  heparin  Injectable 5000 Unit(s) SubCutaneous every 12 hours  meropenem  IVPB 1000 milliGRAM(s) IV Intermittent every 12 hours  phenylephrine    Infusion 0.5 MICROgram(s)/kG/Min (12.581 mL/Hr) IV Continuous <Continuous>  sodium chloride 0.9% Bolus 2000 milliLiter(s) IV Bolus once  sodium chloride 0.9% Bolus 2000 milliLiter(s) IV Bolus once    MEDICATIONS  (PRN):  ondansetron Injectable 4 milliGRAM(s) IV Push every 6 hours PRN Nausea and/or Vomiting  oxyCODONE    5 mG/acetaminophen 325 mG 1 Tablet(s) Oral every 6 hours PRN PAIN      ICU Vital Signs Last 24 Hrs  T(C): 36.6 (20 Jun 2018 07:28), Max: 37.1 (19 Jun 2018 12:00)  T(F): 97.8 (20 Jun 2018 07:28), Max: 98.8 (19 Jun 2018 12:00)  HR: 62 (20 Jun 2018 07:28) (51 - 66)  BP: 118/66 (20 Jun 2018 07:28) (82/48 - 121/55)  BP(mean): 78 (20 Jun 2018 07:28) (56 - 82)  ABP: --  ABP(mean): --  RR: 30 (20 Jun 2018 07:28) (10 - 30)  SpO2: 91% (20 Jun 2018 01:00) (91% - 96%)          I&O's Summary    19 Jun 2018 07:01  -  20 Jun 2018 07:00  --------------------------------------------------------  IN: 1756 mL / OUT: 4575 mL / NET: -2819 mL                          10.3   5.41  )-----------( 169      ( 20 Jun 2018 05:05 )             32.1       06-20    143  |  109<H>  |  12  ----------------------------<  96  3.5   |  28  |  0.72    Ca    8.3<L>      20 Jun 2018 05:05  Phos  2.2     06-20  Mg     1.6     06-20        DVT Prophylaxis:   Heparin subq                                                             Advanced Directives: Full code
78 year old male with hx. prostate ca, s/p previous nephrostomy tubes, who had right nephrostomy fall out last night  He went to [facility] with fever to 102 and was sent to ED. In ed febrile, borderline bp, ct showed right hydronephrosis.  Patient went to IR  and had placement of Nephrostomy tube.  In pacu patient with rigors, with temp to 101.   Responded to code sepsis for bp in 70s . patinet lactate had corrected, inc after procedure to 3.0  patient comfortable , c/o pain around nephrostomy  tube.     Above d/w Dr Marcos who initially evaluated and treated the patient post-procedure.    Admitted to CCU and started on pressors for support of MAP.    : pt on phenylephrine infusion to support MAP - comfortable.  nephrostomy with clear yellow urine.  AA and Interactive - NAD.  Gram (-) rods in blood cultures.      PAST MEDICAL & SURGICAL HISTORY:  Colon obstruction  Prostate CA  Gallstones  Neurogenic bladder  Carotid stenosis: right and left  AAA (abdominal aortic aneurysm):   Left rib fracture  S/P cataract surgery, right  H/O hemicolectomy  History of intestinal surgery  History of cholecystectomy  THR (Total Hip Replacement)      Allergies    morphine (Vomiting)  sulfa drugs (Other)    Height (cm): 180.34 ( @ 13:24)  Weight (kg): 67.1 ( @ 13:24)  BMI (kg/m2): 20.6 ( @ 13:24)    ICU Vital Signs Last 24 Hrs  T(C): 36.8 (2018 08:07), Max: 38.9 (2018 14:43)  T(F): 98.2 (2018 08:07), Max: 102 (2018 14:43)  HR: 83 (2018 09:00) (67 - 121)  BP: 101/58 (2018 09:00) (73/43 - 162/100)  BP(mean): 68 (2018 09:00) (53 - 80)  ABP: --  ABP(mean): --  RR: 19 (2018 09:00) (14 - 34)  SpO2: 99% (2018 09:00) (92% - 100%)          I&O's Summary    2018 07:01  -  2018 07:00  --------------------------------------------------------  IN: 2100 mL / OUT: 2530 mL / NET: -430 mL    2018 07:01  -  2018 10:28  --------------------------------------------------------  IN: 0 mL / OUT: 250 mL / NET: -250 mL                              10.4   17.51 )-----------( 183      ( 2018 06:54 )             33.3       06-17    147<H>  |  116<H>  |  19  ----------------------------<  106<H>  3.6   |  22  |  1.39<H>    Ca    7.6<L>      2018 06:54    TPro  7.8  /  Alb  3.2<L>  /  TBili  0.9  /  DBili  x   /  AST  17  /  ALT  15  /  AlkPhos  116  06-16      CAPILLARY BLOOD GLUCOSE          LIVER FUNCTIONS - ( 2018 14:16 )  Alb: 3.2 g/dL / Pro: 7.8 gm/dL / ALK PHOS: 116 U/L / ALT: 15 U/L / AST: 17 U/L / GGT: x                   PT/INR - ( 2018 14:16 )   PT: 18.2 sec;   INR: 1.67 ratio         PTT - ( 2018 14:16 )  PTT:33.2 sec        Urinalysis Basic - ( 2018 14:45 )    Color: Yellow / Appearance: very cloudy / S.020 / pH: x  Gluc: x / Ketone: Negative  / Bili: Negative / Urobili: 1 mg/dL   Blood: x / Protein: 100 mg/dL / Nitrite: Positive   Leuk Esterase: Moderate / RBC: 25-50 /HPF / WBC >50   Sq Epi: x / Non Sq Epi: Many / Bacteria: Many        MEDICATIONS  (STANDING):  aspirin  chewable 81 milliGRAM(s) Oral daily  heparin  Injectable 5000 Unit(s) SubCutaneous every 12 hours  lactated ringers. 1000 milliLiter(s) (75 mL/Hr) IV Continuous <Continuous>  meropenem  IVPB 1000 milliGRAM(s) IV Intermittent every 12 hours  phenylephrine    Infusion 0.5 MICROgram(s)/kG/Min (12.581 mL/Hr) IV Continuous <Continuous>  sodium chloride 0.9% Bolus 2000 milliLiter(s) IV Bolus once  sodium chloride 0.9% Bolus 2000 milliLiter(s) IV Bolus once    MEDICATIONS  (PRN):  ondansetron Injectable 4 milliGRAM(s) IV Push every 6 hours PRN Nausea and/or Vomiting  oxyCODONE    5 mG/acetaminophen 325 mG 1 Tablet(s) Oral every 6 hours PRN PAIN          DVT Prophylaxis: SQ heparin, venodynes    Advanced Directives: FULL  Discussed with: patient    Visit Information: Critical care time 45 min.    ** Time is exclusive of billed procedures and/or teaching and/or routine family updates.